# Patient Record
Sex: FEMALE | Race: WHITE | Employment: OTHER | URBAN - NONMETROPOLITAN AREA
[De-identification: names, ages, dates, MRNs, and addresses within clinical notes are randomized per-mention and may not be internally consistent; named-entity substitution may affect disease eponyms.]

---

## 2017-06-06 ENCOUNTER — HOSPITAL ENCOUNTER (OUTPATIENT)
Dept: VASCULAR LAB | Age: 77
Discharge: HOME | End: 2017-06-06
Payer: MEDICARE

## 2017-06-06 ENCOUNTER — OFFICE VISIT (OUTPATIENT)
Dept: VASCULAR SURGERY | Age: 77
End: 2017-06-06
Payer: MEDICARE

## 2017-06-06 VITALS
RESPIRATION RATE: 18 BRPM | DIASTOLIC BLOOD PRESSURE: 64 MMHG | OXYGEN SATURATION: 97 % | TEMPERATURE: 97.1 F | HEART RATE: 59 BPM | SYSTOLIC BLOOD PRESSURE: 118 MMHG

## 2017-06-06 DIAGNOSIS — I65.23 BILATERAL CAROTID ARTERY STENOSIS: ICD-10-CM

## 2017-06-06 DIAGNOSIS — S91.109A OPEN TOE WOUND, INITIAL ENCOUNTER: ICD-10-CM

## 2017-06-06 DIAGNOSIS — I73.9 PVD (PERIPHERAL VASCULAR DISEASE) (HCC): ICD-10-CM

## 2017-06-06 DIAGNOSIS — I73.9 PVD (PERIPHERAL VASCULAR DISEASE) (HCC): Primary | ICD-10-CM

## 2017-06-06 PROCEDURE — 1036F TOBACCO NON-USER: CPT | Performed by: NURSE PRACTITIONER

## 2017-06-06 PROCEDURE — 99214 OFFICE O/P EST MOD 30 MIN: CPT | Performed by: NURSE PRACTITIONER

## 2017-06-06 PROCEDURE — 93880 EXTRACRANIAL BILAT STUDY: CPT

## 2017-06-06 PROCEDURE — G8421 BMI NOT CALCULATED: HCPCS | Performed by: NURSE PRACTITIONER

## 2017-06-06 PROCEDURE — G8427 DOCREV CUR MEDS BY ELIG CLIN: HCPCS | Performed by: NURSE PRACTITIONER

## 2017-06-06 PROCEDURE — G8598 ASA/ANTIPLAT THER USED: HCPCS | Performed by: NURSE PRACTITIONER

## 2017-06-06 PROCEDURE — 4040F PNEUMOC VAC/ADMIN/RCVD: CPT | Performed by: NURSE PRACTITIONER

## 2017-06-06 PROCEDURE — 1123F ACP DISCUSS/DSCN MKR DOCD: CPT | Performed by: NURSE PRACTITIONER

## 2017-06-06 PROCEDURE — 3017F COLORECTAL CA SCREEN DOC REV: CPT | Performed by: NURSE PRACTITIONER

## 2017-06-06 PROCEDURE — 93923 UPR/LXTR ART STDY 3+ LVLS: CPT

## 2017-06-06 PROCEDURE — 1090F PRES/ABSN URINE INCON ASSESS: CPT | Performed by: NURSE PRACTITIONER

## 2017-06-06 PROCEDURE — G8400 PT W/DXA NO RESULTS DOC: HCPCS | Performed by: NURSE PRACTITIONER

## 2017-06-07 ENCOUNTER — TELEPHONE (OUTPATIENT)
Dept: VASCULAR SURGERY | Age: 77
End: 2017-06-07

## 2017-06-14 ENCOUNTER — OFFICE VISIT (OUTPATIENT)
Dept: VASCULAR SURGERY | Age: 77
End: 2017-06-14
Payer: MEDICARE

## 2017-06-14 VITALS
TEMPERATURE: 97.8 F | RESPIRATION RATE: 18 BRPM | DIASTOLIC BLOOD PRESSURE: 70 MMHG | OXYGEN SATURATION: 90 % | SYSTOLIC BLOOD PRESSURE: 130 MMHG | HEART RATE: 69 BPM

## 2017-06-14 DIAGNOSIS — T81.31XD WOUND DEHISCENCE, SUBSEQUENT ENCOUNTER: Primary | ICD-10-CM

## 2017-06-14 DIAGNOSIS — S91.109D WOUND, OPEN, TOE, SUBSEQUENT ENCOUNTER: Primary | ICD-10-CM

## 2017-06-14 PROCEDURE — G8427 DOCREV CUR MEDS BY ELIG CLIN: HCPCS | Performed by: NURSE PRACTITIONER

## 2017-06-14 PROCEDURE — 1036F TOBACCO NON-USER: CPT | Performed by: NURSE PRACTITIONER

## 2017-06-14 PROCEDURE — 1090F PRES/ABSN URINE INCON ASSESS: CPT | Performed by: NURSE PRACTITIONER

## 2017-06-14 PROCEDURE — 1123F ACP DISCUSS/DSCN MKR DOCD: CPT | Performed by: NURSE PRACTITIONER

## 2017-06-14 PROCEDURE — G8400 PT W/DXA NO RESULTS DOC: HCPCS | Performed by: NURSE PRACTITIONER

## 2017-06-14 PROCEDURE — 99212 OFFICE O/P EST SF 10 MIN: CPT | Performed by: NURSE PRACTITIONER

## 2017-06-14 PROCEDURE — 3017F COLORECTAL CA SCREEN DOC REV: CPT | Performed by: NURSE PRACTITIONER

## 2017-06-14 PROCEDURE — G8598 ASA/ANTIPLAT THER USED: HCPCS | Performed by: NURSE PRACTITIONER

## 2017-06-14 PROCEDURE — 4040F PNEUMOC VAC/ADMIN/RCVD: CPT | Performed by: NURSE PRACTITIONER

## 2017-06-14 PROCEDURE — G8421 BMI NOT CALCULATED: HCPCS | Performed by: NURSE PRACTITIONER

## 2017-06-14 RX ORDER — DOXYCYCLINE HYCLATE 100 MG
100 TABLET ORAL 2 TIMES DAILY
Qty: 20 TABLET | Refills: 0 | Status: SHIPPED | OUTPATIENT
Start: 2017-06-14 | End: 2017-06-20 | Stop reason: SDUPTHER

## 2017-06-18 LAB
ANAEROBIC CULTURE: ABNORMAL
GRAM STAIN RESULT: ABNORMAL
ORGANISM: ABNORMAL
WOUND/ABSCESS: ABNORMAL

## 2017-06-20 ENCOUNTER — OFFICE VISIT (OUTPATIENT)
Dept: VASCULAR SURGERY | Age: 77
End: 2017-06-20
Payer: MEDICARE

## 2017-06-20 VITALS
HEART RATE: 60 BPM | DIASTOLIC BLOOD PRESSURE: 80 MMHG | SYSTOLIC BLOOD PRESSURE: 150 MMHG | TEMPERATURE: 96 F | RESPIRATION RATE: 18 BRPM

## 2017-06-20 DIAGNOSIS — S91.109D OPEN TOE WOUND, SUBSEQUENT ENCOUNTER: ICD-10-CM

## 2017-06-20 DIAGNOSIS — I73.9 PVD (PERIPHERAL VASCULAR DISEASE) (HCC): Primary | ICD-10-CM

## 2017-06-20 LAB
INTERNATIONAL NORMALIZATION RATIO, POC: 2.8
PROTHROMBIN TIME, POC: NORMAL

## 2017-06-20 PROCEDURE — 1090F PRES/ABSN URINE INCON ASSESS: CPT | Performed by: NURSE PRACTITIONER

## 2017-06-20 PROCEDURE — 1036F TOBACCO NON-USER: CPT | Performed by: NURSE PRACTITIONER

## 2017-06-20 PROCEDURE — G8421 BMI NOT CALCULATED: HCPCS | Performed by: NURSE PRACTITIONER

## 2017-06-20 PROCEDURE — 85610 PROTHROMBIN TIME: CPT | Performed by: NURSE PRACTITIONER

## 2017-06-20 PROCEDURE — G8598 ASA/ANTIPLAT THER USED: HCPCS | Performed by: NURSE PRACTITIONER

## 2017-06-20 PROCEDURE — 1123F ACP DISCUSS/DSCN MKR DOCD: CPT | Performed by: NURSE PRACTITIONER

## 2017-06-20 PROCEDURE — 3017F COLORECTAL CA SCREEN DOC REV: CPT | Performed by: NURSE PRACTITIONER

## 2017-06-20 PROCEDURE — G8427 DOCREV CUR MEDS BY ELIG CLIN: HCPCS | Performed by: NURSE PRACTITIONER

## 2017-06-20 PROCEDURE — 99212 OFFICE O/P EST SF 10 MIN: CPT | Performed by: NURSE PRACTITIONER

## 2017-06-20 PROCEDURE — 4040F PNEUMOC VAC/ADMIN/RCVD: CPT | Performed by: NURSE PRACTITIONER

## 2017-06-20 PROCEDURE — G8400 PT W/DXA NO RESULTS DOC: HCPCS | Performed by: NURSE PRACTITIONER

## 2017-06-20 RX ORDER — MUPIROCIN CALCIUM 20 MG/G
CREAM TOPICAL
Qty: 1 TUBE | Refills: 1 | Status: SHIPPED | OUTPATIENT
Start: 2017-06-20 | End: 2017-07-20

## 2017-06-20 RX ORDER — DOXYCYCLINE HYCLATE 100 MG
100 TABLET ORAL 2 TIMES DAILY
Qty: 20 TABLET | Refills: 0 | Status: SHIPPED | OUTPATIENT
Start: 2017-06-20 | End: 2017-06-30

## 2017-06-21 PROBLEM — S91.109A OPEN TOE WOUND: Status: ACTIVE | Noted: 2017-06-21

## 2017-06-28 ENCOUNTER — OFFICE VISIT (OUTPATIENT)
Dept: VASCULAR SURGERY | Age: 77
End: 2017-06-28
Payer: MEDICARE

## 2017-06-28 VITALS
TEMPERATURE: 96 F | SYSTOLIC BLOOD PRESSURE: 139 MMHG | RESPIRATION RATE: 18 BRPM | DIASTOLIC BLOOD PRESSURE: 61 MMHG | HEART RATE: 60 BPM

## 2017-06-28 DIAGNOSIS — S91.109D OPEN TOE WOUND, SUBSEQUENT ENCOUNTER: Primary | ICD-10-CM

## 2017-06-28 DIAGNOSIS — I73.9 PVD (PERIPHERAL VASCULAR DISEASE) (HCC): ICD-10-CM

## 2017-06-28 DIAGNOSIS — M1A.9XX1 MULTIPLE GOUTY TOPHI: ICD-10-CM

## 2017-06-28 PROCEDURE — G8427 DOCREV CUR MEDS BY ELIG CLIN: HCPCS | Performed by: PHYSICIAN ASSISTANT

## 2017-06-28 PROCEDURE — 1036F TOBACCO NON-USER: CPT | Performed by: PHYSICIAN ASSISTANT

## 2017-06-28 PROCEDURE — 4040F PNEUMOC VAC/ADMIN/RCVD: CPT | Performed by: PHYSICIAN ASSISTANT

## 2017-06-28 PROCEDURE — 99213 OFFICE O/P EST LOW 20 MIN: CPT | Performed by: PHYSICIAN ASSISTANT

## 2017-06-28 PROCEDURE — G8421 BMI NOT CALCULATED: HCPCS | Performed by: PHYSICIAN ASSISTANT

## 2017-06-28 PROCEDURE — 1123F ACP DISCUSS/DSCN MKR DOCD: CPT | Performed by: PHYSICIAN ASSISTANT

## 2017-06-28 PROCEDURE — G8400 PT W/DXA NO RESULTS DOC: HCPCS | Performed by: PHYSICIAN ASSISTANT

## 2017-06-28 PROCEDURE — 1090F PRES/ABSN URINE INCON ASSESS: CPT | Performed by: PHYSICIAN ASSISTANT

## 2017-06-28 PROCEDURE — 3017F COLORECTAL CA SCREEN DOC REV: CPT | Performed by: PHYSICIAN ASSISTANT

## 2017-06-28 PROCEDURE — G8598 ASA/ANTIPLAT THER USED: HCPCS | Performed by: PHYSICIAN ASSISTANT

## 2017-08-03 ENCOUNTER — OFFICE VISIT (OUTPATIENT)
Dept: VASCULAR SURGERY | Age: 77
End: 2017-08-03
Payer: MEDICARE

## 2017-08-03 VITALS
SYSTOLIC BLOOD PRESSURE: 165 MMHG | DIASTOLIC BLOOD PRESSURE: 75 MMHG | RESPIRATION RATE: 18 BRPM | HEART RATE: 65 BPM | TEMPERATURE: 96 F

## 2017-08-03 DIAGNOSIS — S91.109D OPEN TOE WOUND, SUBSEQUENT ENCOUNTER: Primary | ICD-10-CM

## 2017-08-03 PROCEDURE — 3017F COLORECTAL CA SCREEN DOC REV: CPT | Performed by: PHYSICIAN ASSISTANT

## 2017-08-03 PROCEDURE — G8421 BMI NOT CALCULATED: HCPCS | Performed by: PHYSICIAN ASSISTANT

## 2017-08-03 PROCEDURE — G8598 ASA/ANTIPLAT THER USED: HCPCS | Performed by: PHYSICIAN ASSISTANT

## 2017-08-03 PROCEDURE — 1090F PRES/ABSN URINE INCON ASSESS: CPT | Performed by: PHYSICIAN ASSISTANT

## 2017-08-03 PROCEDURE — 4040F PNEUMOC VAC/ADMIN/RCVD: CPT | Performed by: PHYSICIAN ASSISTANT

## 2017-08-03 PROCEDURE — G8400 PT W/DXA NO RESULTS DOC: HCPCS | Performed by: PHYSICIAN ASSISTANT

## 2017-08-03 PROCEDURE — G8427 DOCREV CUR MEDS BY ELIG CLIN: HCPCS | Performed by: PHYSICIAN ASSISTANT

## 2017-08-03 PROCEDURE — 1036F TOBACCO NON-USER: CPT | Performed by: PHYSICIAN ASSISTANT

## 2017-08-03 PROCEDURE — 1123F ACP DISCUSS/DSCN MKR DOCD: CPT | Performed by: PHYSICIAN ASSISTANT

## 2017-08-03 PROCEDURE — 99213 OFFICE O/P EST LOW 20 MIN: CPT | Performed by: PHYSICIAN ASSISTANT

## 2017-09-19 ENCOUNTER — OFFICE VISIT (OUTPATIENT)
Dept: VASCULAR SURGERY | Age: 77
End: 2017-09-19
Payer: MEDICARE

## 2017-09-19 VITALS
WEIGHT: 162 LBS | OXYGEN SATURATION: 92 % | HEIGHT: 62 IN | HEART RATE: 61 BPM | SYSTOLIC BLOOD PRESSURE: 126 MMHG | DIASTOLIC BLOOD PRESSURE: 70 MMHG | BODY MASS INDEX: 29.81 KG/M2

## 2017-09-19 DIAGNOSIS — S91.109D OPEN TOE WOUND, SUBSEQUENT ENCOUNTER: Primary | ICD-10-CM

## 2017-09-19 DIAGNOSIS — M1A.9XX1 MULTIPLE GOUTY TOPHI: ICD-10-CM

## 2017-09-19 PROCEDURE — G8427 DOCREV CUR MEDS BY ELIG CLIN: HCPCS | Performed by: PHYSICIAN ASSISTANT

## 2017-09-19 PROCEDURE — G8419 CALC BMI OUT NRM PARAM NOF/U: HCPCS | Performed by: PHYSICIAN ASSISTANT

## 2017-09-19 PROCEDURE — G8400 PT W/DXA NO RESULTS DOC: HCPCS | Performed by: PHYSICIAN ASSISTANT

## 2017-09-19 PROCEDURE — 1090F PRES/ABSN URINE INCON ASSESS: CPT | Performed by: PHYSICIAN ASSISTANT

## 2017-09-19 PROCEDURE — 1123F ACP DISCUSS/DSCN MKR DOCD: CPT | Performed by: PHYSICIAN ASSISTANT

## 2017-09-19 PROCEDURE — G8598 ASA/ANTIPLAT THER USED: HCPCS | Performed by: PHYSICIAN ASSISTANT

## 2017-09-19 PROCEDURE — 99213 OFFICE O/P EST LOW 20 MIN: CPT | Performed by: PHYSICIAN ASSISTANT

## 2017-09-19 PROCEDURE — 3017F COLORECTAL CA SCREEN DOC REV: CPT | Performed by: PHYSICIAN ASSISTANT

## 2017-09-19 PROCEDURE — 4040F PNEUMOC VAC/ADMIN/RCVD: CPT | Performed by: PHYSICIAN ASSISTANT

## 2017-09-19 PROCEDURE — 1036F TOBACCO NON-USER: CPT | Performed by: PHYSICIAN ASSISTANT

## 2017-11-14 ENCOUNTER — OFFICE VISIT (OUTPATIENT)
Dept: VASCULAR SURGERY | Age: 77
End: 2017-11-14
Payer: MEDICARE

## 2017-11-14 VITALS — SYSTOLIC BLOOD PRESSURE: 118 MMHG | DIASTOLIC BLOOD PRESSURE: 78 MMHG

## 2017-11-14 DIAGNOSIS — S91.109D OPEN WOUND OF TOE, SUBSEQUENT ENCOUNTER: Primary | ICD-10-CM

## 2017-11-14 PROCEDURE — G8400 PT W/DXA NO RESULTS DOC: HCPCS | Performed by: NURSE PRACTITIONER

## 2017-11-14 PROCEDURE — 1036F TOBACCO NON-USER: CPT | Performed by: NURSE PRACTITIONER

## 2017-11-14 PROCEDURE — G8484 FLU IMMUNIZE NO ADMIN: HCPCS | Performed by: NURSE PRACTITIONER

## 2017-11-14 PROCEDURE — 1090F PRES/ABSN URINE INCON ASSESS: CPT | Performed by: NURSE PRACTITIONER

## 2017-11-14 PROCEDURE — G8598 ASA/ANTIPLAT THER USED: HCPCS | Performed by: NURSE PRACTITIONER

## 2017-11-14 PROCEDURE — 3017F COLORECTAL CA SCREEN DOC REV: CPT | Performed by: NURSE PRACTITIONER

## 2017-11-14 PROCEDURE — 1123F ACP DISCUSS/DSCN MKR DOCD: CPT | Performed by: NURSE PRACTITIONER

## 2017-11-14 PROCEDURE — G8417 CALC BMI ABV UP PARAM F/U: HCPCS | Performed by: NURSE PRACTITIONER

## 2017-11-14 PROCEDURE — 4040F PNEUMOC VAC/ADMIN/RCVD: CPT | Performed by: NURSE PRACTITIONER

## 2017-11-14 PROCEDURE — 99212 OFFICE O/P EST SF 10 MIN: CPT | Performed by: NURSE PRACTITIONER

## 2017-11-14 PROCEDURE — G8428 CUR MEDS NOT DOCUMENT: HCPCS | Performed by: NURSE PRACTITIONER

## 2018-01-31 ENCOUNTER — HOSPITAL ENCOUNTER (OUTPATIENT)
Dept: NON INVASIVE DIAGNOSTICS | Age: 78
Discharge: HOME | End: 2018-01-31
Payer: MEDICARE

## 2018-01-31 ENCOUNTER — OFFICE VISIT (OUTPATIENT)
Dept: VASCULAR SURGERY | Age: 78
End: 2018-01-31
Payer: MEDICARE

## 2018-01-31 VITALS
DIASTOLIC BLOOD PRESSURE: 64 MMHG | SYSTOLIC BLOOD PRESSURE: 134 MMHG | TEMPERATURE: 96.2 F | HEART RATE: 60 BPM | RESPIRATION RATE: 18 BRPM

## 2018-01-31 DIAGNOSIS — I73.9 PVD (PERIPHERAL VASCULAR DISEASE) (HCC): Primary | ICD-10-CM

## 2018-01-31 DIAGNOSIS — S91.109D OPEN WOUND OF TOE, SUBSEQUENT ENCOUNTER: ICD-10-CM

## 2018-01-31 DIAGNOSIS — I73.9 PVD (PERIPHERAL VASCULAR DISEASE) (HCC): ICD-10-CM

## 2018-01-31 PROCEDURE — 4040F PNEUMOC VAC/ADMIN/RCVD: CPT | Performed by: PHYSICIAN ASSISTANT

## 2018-01-31 PROCEDURE — 1036F TOBACCO NON-USER: CPT | Performed by: PHYSICIAN ASSISTANT

## 2018-01-31 PROCEDURE — G8484 FLU IMMUNIZE NO ADMIN: HCPCS | Performed by: PHYSICIAN ASSISTANT

## 2018-01-31 PROCEDURE — G8598 ASA/ANTIPLAT THER USED: HCPCS | Performed by: PHYSICIAN ASSISTANT

## 2018-01-31 PROCEDURE — G8417 CALC BMI ABV UP PARAM F/U: HCPCS | Performed by: PHYSICIAN ASSISTANT

## 2018-01-31 PROCEDURE — 1123F ACP DISCUSS/DSCN MKR DOCD: CPT | Performed by: PHYSICIAN ASSISTANT

## 2018-01-31 PROCEDURE — 93923 UPR/LXTR ART STDY 3+ LVLS: CPT

## 2018-01-31 PROCEDURE — G8400 PT W/DXA NO RESULTS DOC: HCPCS | Performed by: PHYSICIAN ASSISTANT

## 2018-01-31 PROCEDURE — 99213 OFFICE O/P EST LOW 20 MIN: CPT | Performed by: PHYSICIAN ASSISTANT

## 2018-01-31 PROCEDURE — G8427 DOCREV CUR MEDS BY ELIG CLIN: HCPCS | Performed by: PHYSICIAN ASSISTANT

## 2018-01-31 PROCEDURE — 1090F PRES/ABSN URINE INCON ASSESS: CPT | Performed by: PHYSICIAN ASSISTANT

## 2018-01-31 RX ORDER — HYDROCODONE BITARTRATE AND ACETAMINOPHEN 7.5; 325 MG/1; MG/1
1 TABLET ORAL EVERY 6 HOURS PRN
COMMUNITY

## 2018-01-31 NOTE — PROGRESS NOTES
Patient Care Team:  Ni Woody MD (Inactive) as PCP - Mariano Cooks, MD (Family Medicine)  MD Latesha Seals MD as Consulting Physician (Vascular Surgery)      Mrs. Naida Caldwell presents today for f/u chronic wound check. She reports she has three open wounds on her toes and a callus on her left heel. She is still using mupirocin on the wounds. She thinks that the wounds look a little better. She denies any pain in the wounds at this time. She denies any fever/chills. Pablo Ibarra is a 69 y.o. female with the following history reviewed and recorded in [facility]:  Patient Active Problem List    Diagnosis Date Noted    Open toe wound 06/21/2017    PVD (peripheral vascular disease) ([facility] [address] 04/08/2015    Carotid artery stenosis 02/02/2012    CKD (chronic kidney disease) stage 3, GFR 30-59 ml/min     CHF (congestive heart failure) ([facility] [address]     CAD (coronary artery disease)     Hyperlipidemia     Hypertension      Current Outpatient Prescriptions   Medication Sig Dispense Refill    sacubitril-valsartan (ENTRESTO) 49-51 MG per tablet Take 1 tablet by mouth 2 times daily      HYDROcodone-acetaminophen (NORCO) 7.5-325 MG per tablet Take 1 tablet by mouth every 6 hours as needed for Pain.  mupirocin (BACTROBAN) 2 % ointment Apply topically 3 times daily Apply topically 3 times daily.  insulin lispro (HUMALOG) 100 UNIT/ML injection vial Inject into the skin 3 times daily (before meals)      colchicine 0.6 MG tablet Take 0.6 mg by mouth daily as needed.  ondansetron (ZOFRAN) 4 MG tablet Take 4 mg by mouth every 8 hours as needed.  Ergocalciferol (VITAMIN D2 PO) Take 50,000 Units by mouth once a week.  calcitRIOL (ROCALTROL) 0.25 MCG capsule Take 0.25 mcg by mouth every other day Indications: 5 days a week       carvedilol (COREG) 25 MG tablet Take 25 mg by mouth 2 times daily (with meals).       clonidine (CATAPRES) 0.2 MG tablet Take 0.2 mg by mouth 2 times daily.  furosemide (LASIX) 80 MG tablet Take 80 mg by mouth 2 times daily.  LANTUS 100 UNIT/ML injection Inject 20 Units into the skin nightly       metolazone (ZAROXOLYN) 2.5 MG tablet Take 2.5 mg by mouth as needed.  warfarin (COUMADIN) 1 MG tablet       warfarin (COUMADIN) 5 MG tablet Dr Leny Hare regulates      aspirin EC 81 MG EC tablet Take 81 mg by mouth daily. No current facility-administered medications for this visit.       Allergies: Iron  Past Medical History:   Diagnosis Date    Anemia     Asthma     CAD (coronary artery disease)     Carotid artery occlusion     [facility]     Chronic kidney disease, stage IV (severe) [facility])     Chronic respiratory failure ([facility])     Diabetes mellitus ([facility])     Type 2    Hypercholesteremia     Hyperlipidemia     Hypertension     Intraparenchymal renal artery disease ([facility])     Peripheral vascular disease ([facility] [address]     Secondary hyperparathyroidism (of renal origin)      Past Surgical History:   Procedure Laterality Date    ANGIOPLASTY  05/25/06 [facility]    Right SFA and popliteal artery agioplasty with 6x20 cutting balloon    ANGIOPLASTY  07/06/06 [facility]    Left CFA endarterectomy with vein patch angioplasty and remote SFA endarterectomy with distal end-point stenting in proximal pop artery through the distal SFA with a 6x15 viabahn stent and completion angiography    CATARACT REMOVAL  January    CATARACT REMOVAL  March    CHOLECYSTECTOMY  1991    CORONARY ARTERY BYPASS GRAFT  07/06/2005 [facility]    6 vessel    HERNIA REPAIR  1991    OTHER SURGICAL HISTORY  03/09/07 [facility]    aortogram and run off    TUBAL LIGATION  1974     Family History   Problem Relation Age of Onset    Cancer Mother     Diabetes Mother     Cancer Father      Social History   Substance Use Topics    Smoking status: Former Smoker     Packs/day: 1.50     Years: 40.00     Quit date: 2/1/2005    Smokeless tobacco: Never Used    Alcohol risks and benefits. She declined A-gram at this time. We will follow up in the office for a wound check.

## 2018-03-06 ENCOUNTER — HOSPITAL ENCOUNTER (INPATIENT)
Facility: HOSPITAL | Age: 78
LOS: 7 days | Discharge: NURSING, REHAB OR LONG-TERM CARE | End: 2018-03-13
Attending: INTERNAL MEDICINE | Admitting: INTERNAL MEDICINE

## 2018-03-06 DIAGNOSIS — Z74.09 IMPAIRED MOBILITY AND ACTIVITIES OF DAILY LIVING: ICD-10-CM

## 2018-03-06 DIAGNOSIS — Z74.09 IMPAIRED PHYSICAL MOBILITY: ICD-10-CM

## 2018-03-06 DIAGNOSIS — Z78.9 IMPAIRED MOBILITY AND ACTIVITIES OF DAILY LIVING: ICD-10-CM

## 2018-03-06 PROBLEM — N17.9 AKI (ACUTE KIDNEY INJURY) (HCC): Status: ACTIVE | Noted: 2018-03-06

## 2018-03-06 LAB
BACTERIA UR QL AUTO: ABNORMAL /HPF
BILIRUB UR QL STRIP: NEGATIVE
CLARITY UR: ABNORMAL
COLOR UR: YELLOW
GLUCOSE UR STRIP-MCNC: NEGATIVE MG/DL
HGB UR QL STRIP.AUTO: ABNORMAL
HYALINE CASTS UR QL AUTO: ABNORMAL /LPF
KETONES UR QL STRIP: NEGATIVE
LEUKOCYTE ESTERASE UR QL STRIP.AUTO: ABNORMAL
NITRITE UR QL STRIP: NEGATIVE
PH UR STRIP.AUTO: 5.5 [PH] (ref 5–9)
PROT UR QL STRIP: ABNORMAL
RBC # UR: ABNORMAL /HPF
REF LAB TEST METHOD: ABNORMAL
SP GR UR STRIP: 1.01 (ref 1–1.03)
SQUAMOUS #/AREA URNS HPF: ABNORMAL /HPF
UROBILINOGEN UR QL STRIP: ABNORMAL
WBC UR QL AUTO: ABNORMAL /HPF

## 2018-03-06 PROCEDURE — 25010000002 MORPHINE PER 10 MG: Performed by: HOSPITALIST

## 2018-03-06 PROCEDURE — 82962 GLUCOSE BLOOD TEST: CPT

## 2018-03-06 PROCEDURE — 87077 CULTURE AEROBIC IDENTIFY: CPT | Performed by: HOSPITALIST

## 2018-03-06 PROCEDURE — 81001 URINALYSIS AUTO W/SCOPE: CPT | Performed by: HOSPITALIST

## 2018-03-06 PROCEDURE — 87086 URINE CULTURE/COLONY COUNT: CPT | Performed by: HOSPITALIST

## 2018-03-06 PROCEDURE — 93005 ELECTROCARDIOGRAM TRACING: CPT | Performed by: HOSPITALIST

## 2018-03-06 PROCEDURE — 85025 COMPLETE CBC W/AUTO DIFF WBC: CPT | Performed by: HOSPITALIST

## 2018-03-06 PROCEDURE — 84484 ASSAY OF TROPONIN QUANT: CPT | Performed by: HOSPITALIST

## 2018-03-06 PROCEDURE — 87186 SC STD MICRODIL/AGAR DIL: CPT | Performed by: HOSPITALIST

## 2018-03-06 PROCEDURE — 81003 URINALYSIS AUTO W/O SCOPE: CPT | Performed by: HOSPITALIST

## 2018-03-06 PROCEDURE — 80053 COMPREHEN METABOLIC PANEL: CPT | Performed by: HOSPITALIST

## 2018-03-06 RX ORDER — SODIUM CHLORIDE 9 MG/ML
40 INJECTION, SOLUTION INTRAVENOUS CONTINUOUS
Status: DISCONTINUED | OUTPATIENT
Start: 2018-03-06 | End: 2018-03-09

## 2018-03-06 RX ORDER — CLONIDINE HYDROCHLORIDE 0.2 MG/1
0.2 TABLET ORAL DAILY
Status: DISCONTINUED | OUTPATIENT
Start: 2018-03-07 | End: 2018-03-13 | Stop reason: HOSPADM

## 2018-03-06 RX ORDER — SODIUM CHLORIDE 0.9 % (FLUSH) 0.9 %
1-10 SYRINGE (ML) INJECTION AS NEEDED
Status: DISCONTINUED | OUTPATIENT
Start: 2018-03-06 | End: 2018-03-13 | Stop reason: HOSPADM

## 2018-03-06 RX ORDER — CLONIDINE HYDROCHLORIDE 0.2 MG/1
0.4 TABLET ORAL NIGHTLY
Status: DISCONTINUED | OUTPATIENT
Start: 2018-03-06 | End: 2018-03-13 | Stop reason: HOSPADM

## 2018-03-06 RX ORDER — CLONIDINE HYDROCHLORIDE 0.2 MG/1
0.4 TABLET ORAL NIGHTLY
COMMUNITY

## 2018-03-06 RX ORDER — MORPHINE SULFATE 2 MG/ML
1 INJECTION, SOLUTION INTRAMUSCULAR; INTRAVENOUS EVERY 4 HOURS PRN
Status: DISCONTINUED | OUTPATIENT
Start: 2018-03-06 | End: 2018-03-13 | Stop reason: HOSPADM

## 2018-03-06 RX ORDER — CARVEDILOL 12.5 MG/1
12.5 TABLET ORAL 2 TIMES DAILY WITH MEALS
COMMUNITY

## 2018-03-06 RX ORDER — CLONIDINE HYDROCHLORIDE 0.2 MG/1
0.2 TABLET ORAL DAILY
COMMUNITY

## 2018-03-06 RX ORDER — CALCITRIOL 0.25 UG/1
0.25 CAPSULE, LIQUID FILLED ORAL DAILY
COMMUNITY

## 2018-03-06 RX ORDER — CARVEDILOL 12.5 MG/1
12.5 TABLET ORAL 2 TIMES DAILY WITH MEALS
Status: DISCONTINUED | OUTPATIENT
Start: 2018-03-06 | End: 2018-03-13 | Stop reason: HOSPADM

## 2018-03-06 RX ORDER — CALCITRIOL 0.25 UG/1
0.25 CAPSULE, LIQUID FILLED ORAL DAILY
Status: DISCONTINUED | OUTPATIENT
Start: 2018-03-07 | End: 2018-03-12

## 2018-03-06 RX ORDER — HEPARIN SODIUM 5000 [USP'U]/ML
5000 INJECTION, SOLUTION INTRAVENOUS; SUBCUTANEOUS EVERY 12 HOURS SCHEDULED
Status: DISCONTINUED | OUTPATIENT
Start: 2018-03-06 | End: 2018-03-07

## 2018-03-06 RX ORDER — CLORAZEPATE DIPOTASSIUM 3.75 MG/1
3.75 TABLET ORAL NIGHTLY
COMMUNITY

## 2018-03-06 RX ORDER — CLORAZEPATE DIPOTASSIUM 3.75 MG/1
3.75 TABLET ORAL NIGHTLY
Status: DISCONTINUED | OUTPATIENT
Start: 2018-03-06 | End: 2018-03-13 | Stop reason: HOSPADM

## 2018-03-06 RX ORDER — ASPIRIN 81 MG/1
81 TABLET ORAL DAILY
COMMUNITY

## 2018-03-06 RX ORDER — ASPIRIN 81 MG/1
81 TABLET ORAL DAILY
Status: DISCONTINUED | OUTPATIENT
Start: 2018-03-07 | End: 2018-03-13 | Stop reason: HOSPADM

## 2018-03-06 RX ADMIN — MORPHINE SULFATE 1 MG: 2 INJECTION, SOLUTION INTRAMUSCULAR; INTRAVENOUS at 23:49

## 2018-03-07 LAB
ALBUMIN SERPL-MCNC: 3.5 G/DL (ref 3.4–4.8)
ALBUMIN/GLOB SERPL: 1 G/DL (ref 1.1–1.8)
ALP SERPL-CCNC: 83 U/L (ref 38–126)
ALT SERPL W P-5'-P-CCNC: 29 U/L (ref 9–52)
ANION GAP SERPL CALCULATED.3IONS-SCNC: 17 MMOL/L (ref 5–15)
ANION GAP SERPL CALCULATED.3IONS-SCNC: 22 MMOL/L (ref 5–15)
AST SERPL-CCNC: 16 U/L (ref 14–36)
BASOPHILS # BLD AUTO: 0.03 10*3/MM3 (ref 0–0.2)
BASOPHILS # BLD AUTO: 0.03 10*3/MM3 (ref 0–0.2)
BASOPHILS NFR BLD AUTO: 0.3 % (ref 0–2)
BASOPHILS NFR BLD AUTO: 0.3 % (ref 0–2)
BILIRUB SERPL-MCNC: 0.6 MG/DL (ref 0.2–1.3)
BUN BLD-MCNC: 96 MG/DL (ref 7–21)
BUN BLD-MCNC: 99 MG/DL (ref 7–21)
BUN/CREAT SERPL: 22.1 (ref 7–25)
BUN/CREAT SERPL: 23.7 (ref 7–25)
CALCIUM SPEC-SCNC: 9.6 MG/DL (ref 8.4–10.2)
CALCIUM SPEC-SCNC: 9.7 MG/DL (ref 8.4–10.2)
CHLORIDE SERPL-SCNC: 95 MMOL/L (ref 95–110)
CHLORIDE SERPL-SCNC: 96 MMOL/L (ref 95–110)
CO2 SERPL-SCNC: 24 MMOL/L (ref 22–31)
CO2 SERPL-SCNC: 26 MMOL/L (ref 22–31)
CREAT BLD-MCNC: 4.17 MG/DL (ref 0.5–1)
CREAT BLD-MCNC: 4.34 MG/DL (ref 0.5–1)
DEPRECATED RDW RBC AUTO: 49.6 FL (ref 36.4–46.3)
DEPRECATED RDW RBC AUTO: 49.9 FL (ref 36.4–46.3)
EOSINOPHIL # BLD AUTO: 0.16 10*3/MM3 (ref 0–0.7)
EOSINOPHIL # BLD AUTO: 0.17 10*3/MM3 (ref 0–0.7)
EOSINOPHIL NFR BLD AUTO: 1.5 % (ref 0–7)
EOSINOPHIL NFR BLD AUTO: 1.5 % (ref 0–7)
ERYTHROCYTE [DISTWIDTH] IN BLOOD BY AUTOMATED COUNT: 17.3 % (ref 11.5–14.5)
ERYTHROCYTE [DISTWIDTH] IN BLOOD BY AUTOMATED COUNT: 17.4 % (ref 11.5–14.5)
GFR SERPL CREATININE-BSD FRML MDRD: 10 ML/MIN/1.73 (ref 39–90)
GFR SERPL CREATININE-BSD FRML MDRD: 10 ML/MIN/1.73 (ref 60–90)
GLOBULIN UR ELPH-MCNC: 3.4 GM/DL (ref 2.3–3.5)
GLUCOSE BLD-MCNC: 206 MG/DL (ref 60–100)
GLUCOSE BLD-MCNC: 212 MG/DL (ref 60–100)
GLUCOSE BLDC GLUCOMTR-MCNC: 181 MG/DL (ref 70–130)
GLUCOSE BLDC GLUCOMTR-MCNC: 203 MG/DL (ref 70–130)
GLUCOSE BLDC GLUCOMTR-MCNC: 220 MG/DL (ref 70–130)
GLUCOSE BLDC GLUCOMTR-MCNC: 279 MG/DL (ref 70–130)
HBA1C MFR BLD: 8.8 % (ref 4–5.6)
HCT VFR BLD AUTO: 32.3 % (ref 35–45)
HCT VFR BLD AUTO: 34.2 % (ref 35–45)
HGB BLD-MCNC: 10.8 G/DL (ref 12–15.5)
HGB BLD-MCNC: 11.5 G/DL (ref 12–15.5)
IMM GRANULOCYTES # BLD: 0.01 10*3/MM3 (ref 0–0.02)
IMM GRANULOCYTES # BLD: 0.03 10*3/MM3 (ref 0–0.02)
IMM GRANULOCYTES NFR BLD: 0.1 % (ref 0–0.5)
IMM GRANULOCYTES NFR BLD: 0.3 % (ref 0–0.5)
INR PPP: 2.19 (ref 0.8–1.2)
LYMPHOCYTES # BLD AUTO: 1.27 10*3/MM3 (ref 0.6–4.2)
LYMPHOCYTES # BLD AUTO: 1.29 10*3/MM3 (ref 0.6–4.2)
LYMPHOCYTES NFR BLD AUTO: 11.4 % (ref 10–50)
LYMPHOCYTES NFR BLD AUTO: 12.3 % (ref 10–50)
MCH RBC QN AUTO: 26.4 PG (ref 26.5–34)
MCH RBC QN AUTO: 26.6 PG (ref 26.5–34)
MCHC RBC AUTO-ENTMCNC: 33.4 G/DL (ref 31.4–36)
MCHC RBC AUTO-ENTMCNC: 33.6 G/DL (ref 31.4–36)
MCV RBC AUTO: 79 FL (ref 80–98)
MCV RBC AUTO: 79.2 FL (ref 80–98)
MONOCYTES # BLD AUTO: 0.68 10*3/MM3 (ref 0–0.9)
MONOCYTES # BLD AUTO: 0.73 10*3/MM3 (ref 0–0.9)
MONOCYTES NFR BLD AUTO: 6.1 % (ref 0–12)
MONOCYTES NFR BLD AUTO: 7 % (ref 0–12)
NEUTROPHILS # BLD AUTO: 8.24 10*3/MM3 (ref 2–8.6)
NEUTROPHILS # BLD AUTO: 8.99 10*3/MM3 (ref 2–8.6)
NEUTROPHILS NFR BLD AUTO: 78.8 % (ref 37–80)
NEUTROPHILS NFR BLD AUTO: 80.4 % (ref 37–80)
PLATELET # BLD AUTO: 290 10*3/MM3 (ref 150–450)
PLATELET # BLD AUTO: 323 10*3/MM3 (ref 150–450)
PMV BLD AUTO: 10.8 FL (ref 8–12)
PMV BLD AUTO: 11.2 FL (ref 8–12)
POTASSIUM BLD-SCNC: 3.1 MMOL/L (ref 3.5–5.1)
POTASSIUM BLD-SCNC: 3.3 MMOL/L (ref 3.5–5.1)
PROT SERPL-MCNC: 6.9 G/DL (ref 6.3–8.6)
PROTHROMBIN TIME: 23.4 SECONDS (ref 11.1–15.3)
RBC # BLD AUTO: 4.09 10*6/MM3 (ref 3.77–5.16)
RBC # BLD AUTO: 4.32 10*6/MM3 (ref 3.77–5.16)
SODIUM BLD-SCNC: 139 MMOL/L (ref 137–145)
SODIUM BLD-SCNC: 141 MMOL/L (ref 137–145)
TROPONIN I SERPL-MCNC: 0.04 NG/ML
TROPONIN I SERPL-MCNC: 0.05 NG/ML
TROPONIN I SERPL-MCNC: 0.05 NG/ML
WBC NRBC COR # BLD: 10.46 10*3/MM3 (ref 3.2–9.8)
WBC NRBC COR # BLD: 11.17 10*3/MM3 (ref 3.2–9.8)
WHOLE BLOOD HOLD SPECIMEN: NORMAL

## 2018-03-07 PROCEDURE — 25010000002 CEFTRIAXONE PER 250 MG: Performed by: HOSPITALIST

## 2018-03-07 PROCEDURE — 85610 PROTHROMBIN TIME: CPT | Performed by: HOSPITALIST

## 2018-03-07 PROCEDURE — 93010 ELECTROCARDIOGRAM REPORT: CPT | Performed by: INTERNAL MEDICINE

## 2018-03-07 PROCEDURE — 80048 BASIC METABOLIC PNL TOTAL CA: CPT | Performed by: HOSPITALIST

## 2018-03-07 PROCEDURE — 25010000002 HEPARIN (PORCINE) PER 1000 UNITS: Performed by: HOSPITALIST

## 2018-03-07 PROCEDURE — 85025 COMPLETE CBC W/AUTO DIFF WBC: CPT | Performed by: HOSPITALIST

## 2018-03-07 PROCEDURE — 84484 ASSAY OF TROPONIN QUANT: CPT | Performed by: HOSPITALIST

## 2018-03-07 PROCEDURE — 25010000002 CEFTRIAXONE: Performed by: HOSPITALIST

## 2018-03-07 PROCEDURE — 82962 GLUCOSE BLOOD TEST: CPT

## 2018-03-07 PROCEDURE — 25010000002 MORPHINE PER 10 MG: Performed by: HOSPITALIST

## 2018-03-07 PROCEDURE — 83036 HEMOGLOBIN GLYCOSYLATED A1C: CPT | Performed by: INTERNAL MEDICINE

## 2018-03-07 PROCEDURE — 63710000001 INSULIN ASPART PER 5 UNITS: Performed by: INTERNAL MEDICINE

## 2018-03-07 RX ORDER — DEXTROSE MONOHYDRATE 25 G/50ML
25 INJECTION, SOLUTION INTRAVENOUS
Status: DISCONTINUED | OUTPATIENT
Start: 2018-03-07 | End: 2018-03-13 | Stop reason: HOSPADM

## 2018-03-07 RX ORDER — LEVOTHYROXINE SODIUM 0.07 MG/1
75 TABLET ORAL DAILY
Status: DISCONTINUED | OUTPATIENT
Start: 2018-03-07 | End: 2018-03-13 | Stop reason: HOSPADM

## 2018-03-07 RX ORDER — INSULIN GLARGINE 100 [IU]/ML
20 INJECTION, SOLUTION SUBCUTANEOUS NIGHTLY
COMMUNITY

## 2018-03-07 RX ORDER — ERGOCALCIFEROL 1.25 MG/1
50000 CAPSULE ORAL
COMMUNITY

## 2018-03-07 RX ORDER — WARFARIN SODIUM 1 MG/1
1 TABLET ORAL
COMMUNITY

## 2018-03-07 RX ORDER — WARFARIN SODIUM 3 MG
1.5 TABLET ORAL
Status: DISCONTINUED | OUTPATIENT
Start: 2018-03-07 | End: 2018-03-08

## 2018-03-07 RX ORDER — NYSTATIN 100000 [USP'U]/G
POWDER TOPICAL EVERY 12 HOURS SCHEDULED
Status: DISCONTINUED | OUTPATIENT
Start: 2018-03-08 | End: 2018-03-13 | Stop reason: HOSPADM

## 2018-03-07 RX ORDER — PANTOPRAZOLE SODIUM 40 MG/1
40 TABLET, DELAYED RELEASE ORAL EVERY MORNING
Status: DISCONTINUED | OUTPATIENT
Start: 2018-03-07 | End: 2018-03-13 | Stop reason: HOSPADM

## 2018-03-07 RX ORDER — LISINOPRIL 20 MG/1
20 TABLET ORAL DAILY
COMMUNITY
End: 2018-03-13 | Stop reason: HOSPADM

## 2018-03-07 RX ORDER — ESOMEPRAZOLE MAGNESIUM 40 MG/1
40 CAPSULE, DELAYED RELEASE ORAL
COMMUNITY

## 2018-03-07 RX ORDER — COLCHICINE 0.6 MG/1
0.6 TABLET ORAL AS NEEDED
COMMUNITY

## 2018-03-07 RX ORDER — WARFARIN SODIUM 2.5 MG/1
2.5 TABLET ORAL 2 TIMES WEEKLY
COMMUNITY

## 2018-03-07 RX ORDER — NICOTINE POLACRILEX 4 MG
15 LOZENGE BUCCAL
Status: DISCONTINUED | OUTPATIENT
Start: 2018-03-07 | End: 2018-03-13 | Stop reason: HOSPADM

## 2018-03-07 RX ORDER — LEVOTHYROXINE SODIUM 0.07 MG/1
75 TABLET ORAL DAILY
COMMUNITY

## 2018-03-07 RX ORDER — PRAVASTATIN SODIUM 20 MG
80 TABLET ORAL NIGHTLY
COMMUNITY

## 2018-03-07 RX ORDER — FUROSEMIDE 80 MG
80 TABLET ORAL 2 TIMES DAILY
COMMUNITY

## 2018-03-07 RX ORDER — GLYBURIDE 5 MG/1
10 TABLET ORAL 2 TIMES DAILY
COMMUNITY

## 2018-03-07 RX ORDER — HYDROCODONE BITARTRATE AND ACETAMINOPHEN 5; 325 MG/1; MG/1
1.5 TABLET ORAL EVERY 6 HOURS PRN
Status: DISCONTINUED | OUTPATIENT
Start: 2018-03-07 | End: 2018-03-07

## 2018-03-07 RX ORDER — PROMETHAZINE HYDROCHLORIDE 25 MG/1
25 TABLET ORAL AS NEEDED
COMMUNITY
End: 2018-03-13 | Stop reason: HOSPADM

## 2018-03-07 RX ORDER — METOLAZONE 2.5 MG/1
2.5 TABLET ORAL AS NEEDED
COMMUNITY
End: 2018-03-13 | Stop reason: HOSPADM

## 2018-03-07 RX ORDER — HYDROCODONE BITARTRATE AND ACETAMINOPHEN 5; 325 MG/1; MG/1
1.5 TABLET ORAL EVERY 6 HOURS PRN
COMMUNITY

## 2018-03-07 RX ORDER — POTASSIUM CHLORIDE 20 MEQ/1
20 TABLET, EXTENDED RELEASE ORAL 2 TIMES DAILY
COMMUNITY
End: 2018-03-13 | Stop reason: HOSPADM

## 2018-03-07 RX ORDER — POTASSIUM CHLORIDE 750 MG/1
20 CAPSULE, EXTENDED RELEASE ORAL ONCE
Status: COMPLETED | OUTPATIENT
Start: 2018-03-07 | End: 2018-03-07

## 2018-03-07 RX ORDER — HYDROCODONE BITARTRATE AND ACETAMINOPHEN 7.5; 325 MG/1; MG/1
1 TABLET ORAL EVERY 6 HOURS PRN
Status: DISCONTINUED | OUTPATIENT
Start: 2018-03-07 | End: 2018-03-13 | Stop reason: HOSPADM

## 2018-03-07 RX ORDER — ATORVASTATIN CALCIUM 10 MG/1
10 TABLET, FILM COATED ORAL DAILY
Status: DISCONTINUED | OUTPATIENT
Start: 2018-03-07 | End: 2018-03-13 | Stop reason: HOSPADM

## 2018-03-07 RX ADMIN — CEFTRIAXONE 1 G: 1 INJECTION, POWDER, FOR SOLUTION INTRAMUSCULAR; INTRAVENOUS at 23:30

## 2018-03-07 RX ADMIN — CALCITRIOL 0.25 MCG: 0.25 CAPSULE, LIQUID FILLED ORAL at 09:07

## 2018-03-07 RX ADMIN — CARVEDILOL 12.5 MG: 12.5 TABLET, FILM COATED ORAL at 17:32

## 2018-03-07 RX ADMIN — INSULIN ASPART 3 UNITS: 100 INJECTION, SOLUTION INTRAVENOUS; SUBCUTANEOUS at 21:05

## 2018-03-07 RX ADMIN — ATORVASTATIN CALCIUM 10 MG: 10 TABLET, FILM COATED ORAL at 09:07

## 2018-03-07 RX ADMIN — Medication 1.5 MG: at 17:32

## 2018-03-07 RX ADMIN — MORPHINE SULFATE 1 MG: 2 INJECTION, SOLUTION INTRAMUSCULAR; INTRAVENOUS at 06:05

## 2018-03-07 RX ADMIN — INSULIN ASPART 5 UNITS: 100 INJECTION, SOLUTION INTRAVENOUS; SUBCUTANEOUS at 17:32

## 2018-03-07 RX ADMIN — CLONIDINE HYDROCHLORIDE 0.2 MG: 0.2 TABLET ORAL at 09:07

## 2018-03-07 RX ADMIN — HYDROCODONE BITARTRATE AND ACETAMINOPHEN 1 TABLET: 7.5; 325 TABLET ORAL at 15:26

## 2018-03-07 RX ADMIN — CEFTRIAXONE 1 G: 1 INJECTION, POWDER, FOR SOLUTION INTRAMUSCULAR; INTRAVENOUS at 00:29

## 2018-03-07 RX ADMIN — SODIUM CHLORIDE 75 ML/HR: 9 INJECTION, SOLUTION INTRAVENOUS at 09:54

## 2018-03-07 RX ADMIN — CLORAZEPATE DIPOTASSIUM 3.75 MG: 3.75 TABLET ORAL at 21:05

## 2018-03-07 RX ADMIN — CLONIDINE HYDROCHLORIDE 0.4 MG: 0.2 TABLET ORAL at 21:05

## 2018-03-07 RX ADMIN — HYDROCODONE BITARTRATE AND ACETAMINOPHEN 1 TABLET: 7.5; 325 TABLET ORAL at 05:01

## 2018-03-07 RX ADMIN — ASPIRIN 81 MG: 81 TABLET, COATED ORAL at 09:08

## 2018-03-07 RX ADMIN — HEPARIN SODIUM 5000 UNITS: 5000 INJECTION, SOLUTION INTRAVENOUS; SUBCUTANEOUS at 09:08

## 2018-03-07 RX ADMIN — CARVEDILOL 12.5 MG: 12.5 TABLET, FILM COATED ORAL at 09:07

## 2018-03-07 RX ADMIN — INSULIN ASPART 8 UNITS: 100 INJECTION, SOLUTION INTRAVENOUS; SUBCUTANEOUS at 12:21

## 2018-03-07 RX ADMIN — LEVOTHYROXINE SODIUM 75 MCG: 75 TABLET ORAL at 09:49

## 2018-03-07 RX ADMIN — PANTOPRAZOLE SODIUM 40 MG: 40 TABLET, DELAYED RELEASE ORAL at 09:07

## 2018-03-07 RX ADMIN — POTASSIUM CHLORIDE 20 MEQ: 750 CAPSULE, EXTENDED RELEASE ORAL at 09:54

## 2018-03-07 NOTE — PLAN OF CARE
Problem: Patient Care Overview (Adult)  Goal: Plan of Care Review  Outcome: Ongoing (interventions implemented as appropriate)   03/07/18 0733   Coping/Psychosocial Response Interventions   Plan Of Care Reviewed With patient;spouse   Patient Care Overview   Progress no change   Outcome Evaluation   Outcome Summary/Follow up Plan pt stable entire shift. Vitals WNL. Urine output adeqaute, using bedpan. UTI present - started antibiotics. Pain medication given for leg pain.Patient is very argumentative and verbally abusive to staff, states she wants to leave several times during shift but has no ride home.      Goal: Adult Individualization and Mutuality  Outcome: Ongoing (interventions implemented as appropriate)    Goal: Discharge Needs Assessment  Outcome: Ongoing (interventions implemented as appropriate)      Problem: Renal Failure/Kidney Injury, Acute (Adult)  Goal: Signs and Symptoms of Listed Potential Problems Will be Absent or Manageable (Renal Failure/Kidney Injury, Acute)  Outcome: Ongoing (interventions implemented as appropriate)      Problem: Fall Risk (Adult)  Goal: Identify Related Risk Factors and Signs and Symptoms  Outcome: Outcome(s) achieved Date Met: 03/07/18    Goal: Absence of Falls  Outcome: Ongoing (interventions implemented as appropriate)      Problem: Pressure Ulcer Risk (Foreign Scale) (Adult,Obstetrics,Pediatric)  Goal: Identify Related Risk Factors and Signs and Symptoms  Outcome: Outcome(s) achieved Date Met: 03/07/18    Goal: Skin Integrity  Outcome: Ongoing (interventions implemented as appropriate)      Problem: Pain, Chronic (Adult)  Goal: Identify Related Risk Factors and Signs and Symptoms  Outcome: Outcome(s) achieved Date Met: 03/07/18    Goal: Acceptable Pain Control/Comfort Level  Outcome: Ongoing (interventions implemented as appropriate)

## 2018-03-07 NOTE — CONSULTS
"Adult Nutrition  Assessment    Patient Name:  Dorcas Bain  YOB: 1940  MRN: [id number]  Admit Date:  3/6/2018    Assessment Date:  3/7/2018    Comments:  RD visited due to calculated incorrect BMI based on ht of 72\" instead of her ht of 62\".  Her actual BMI based on correct ht and wt upon admit is 23.59.  She reports a decreased in appetite over the past year when illness with resulting wt loss of 10#.  She has a hx of CHF and DM.  She reports \"fair\" dietary compliance.  RD provided diet education on Low sodium nutrition; Diabetes and Nutrition; and fluid restrictions.  Diet copy and contact numbers provided.                  Anthropometrics       03/07/18 1348    Anthropometrics    Height Method Stated    Height 157.5 cm (62\")    Weight 58.5 kg (129 lb)    Anthropometrics (Special Considerations)    RD Calculated     RD Calculated %     Ideal Body Weight (IBW)    Ideal Body Weight (IBW), Female 50.83    % Ideal Body Weight 115.35    Usual Body Weight (UBW)    Weight Loss 4.536 kg (10 lb)    Weight Loss Time Frame Over the past year    Body Mass Index (BMI)    BMI (kg/m2) 23.64    BMI Grade 19.1 - 24.9 - normal              Labs/Tests/Procedures/Meds       03/07/18 1350    Labs/Tests/Procedures/Meds    Labs/Tests Review K+;Glucose;BUN;Creat    Medication Review Insulin              Estimated/Assessed Needs       03/07/18 1350    Calculation Measurements    Weight Used For Calculations 58.5 kg (129 lb)    Height Used for Calculations 1.575 m (5' 2\")    Estimated/Assessed Energy Needs    Energy Need Method Mono-St Jeor    Age 77    RMR (Mono-St. Jeor Equation) 1028.39    Activity Factors (Mono St Jeor)  Out of bed, ambulatory  1.3    Total estimated needs (Mono St. Jeor) 1335    Estimated/Assessed Protein Needs    Weight Used for Protein Calculation 58.5 kg (129 lb)    Protein (gm/kg) 0.8    0.8 Gm Protein (gm) 46.81    Estimated Protein Range 47    Estimated/Assessed Fluid " Needs    Fluid Need Method Other (comment)   1500cc            Nutrition Prescription Ordered       03/07/18 1351    Nutrition Prescription PO    Current PO Diet Regular    Fluid Consistency Thin            Evaluation of Received Nutrient/Fluid Intake       03/07/18 1351    PO Evaluation    Number of Days PO Intake Evaluated Insufficient Data            Electronically signed by:  Shea Hargrove RD  03/07/18 1:53 PM

## 2018-03-07 NOTE — PLAN OF CARE
Problem: Patient Care Overview (Adult)  Goal: Plan of Care Review  Outcome: Ongoing (interventions implemented as appropriate)   03/07/18 8862   Coping/Psychosocial Response Interventions   Plan Of Care Reviewed With patient;spouse   Patient Care Overview   Progress improving   Outcome Evaluation   Outcome Summary/Follow up Plan VSS, pt with adequate UOP. Pt more pleasant this shift than previous, no aggressive behaviors noted,. Transfer orders in place- waiting for bed placement.        Problem: Renal Failure/Kidney Injury, Acute (Adult)  Goal: Signs and Symptoms of Listed Potential Problems Will be Absent or Manageable (Renal Failure/Kidney Injury, Acute)  Outcome: Ongoing (interventions implemented as appropriate)      Problem: Fall Risk (Adult)  Goal: Absence of Falls  Outcome: Ongoing (interventions implemented as appropriate)      Problem: Pressure Ulcer Risk (Foreign Scale) (Adult,Obstetrics,Pediatric)  Goal: Skin Integrity  Outcome: Ongoing (interventions implemented as appropriate)      Problem: Pain, Chronic (Adult)  Goal: Acceptable Pain Control/Comfort Level  Outcome: Ongoing (interventions implemented as appropriate)

## 2018-03-07 NOTE — H&P
[facility] ER Medicine Admission      Date of Admission: 3/6/2018      Primary Care Physician: Markell Muller MD      Chief Complaint:  Leg pain    HPI:  Patient was adversarial during the interview, so I was unable to obtain the level of detail that I would have liked to obtain.  Patient was primarily concerned about using the bedside commode instead of the bedpan, signing out AMA, and seeing only Dr. Osorio.  I was able to gather from the chart that the patient had been started on Entresto, but had worsening leg pain.  She has chronic claudication.  She called her physician in [place] and was told to stop the Entresto and double up on her lasix.  After she increased her lasix dose, she developed worsening pain and went to the ED in [facility].  She was found to have MELODY on CKD, a UTI, and an elevated troponin.  She was transferred to our service for further care.    Concurrent Medical History:  has a past medical history of CHF (congestive heart failure); Diabetes mellitus; GERD (gastroesophageal reflux disease); Hypercholesteremia; and Hypertension.    Past Surgical History:  has no past surgical history on file.    Family History:  Patient declined to answer    Social History:  Patient declined to answer    Allergies:   Allergies   Allergen Reactions   • Cymbalta [Duloxetine Hcl] GI Intolerance   • Lipitor [Atorvastatin] GI Intolerance   • Vytorin [Ezetimibe-Simvastatin] GI Intolerance       Medications:   Prescriptions Prior to Admission   Medication Sig Dispense Refill Last Dose   • aspirin 81 MG EC tablet Take 81 mg by mouth Daily. Medical record from transferring hospital      • calcitriol (ROCALTROL) 0.25 MCG capsule Take 0.25 mcg by mouth Daily. Medical record from transferring hospital      • carvedilol (COREG) 12.5 MG tablet Take 12.5 mg by mouth 2 (Two) Times a Day With Meals.      • CloNIDine (CATAPRES) 0.2 MG tablet Take 0.2 mg by mouth  Daily. Daily with breakfast      • CloNIDine (CATAPRES) 0.2 MG tablet Take 0.4 mg by mouth Every Night.      • clorazepate (TRANXENE) 3.75 MG tablet Take 3.75 mg by mouth Every Night.          Review of Systems:  Review of Systems   Unable to perform ROS: Other   Patient declined       Physical Exam:      Physical Exam   Constitutional: She is oriented to person, place, and time. She appears well-developed and well-nourished.   HENT:   Head: Normocephalic and atraumatic.   Nose: Nose normal.   Mouth/Throat: Oropharynx is clear and moist.   Eyes: Conjunctivae, EOM and lids are normal. Pupils are equal, round, and reactive to light. No scleral icterus.   Neck: Normal range of motion. Neck supple. No JVD present. No tracheal tenderness and no spinous process tenderness present. No rigidity. No tracheal deviation, no edema and normal range of motion present. No thyromegaly present.   Cardiovascular: Normal rate, regular rhythm, S1 normal, S2 normal, normal heart sounds and normal pulses.  Exam reveals no gallop and no friction rub.    No murmur heard.  Pulmonary/Chest: Effort normal and breath sounds normal. No accessory muscle usage. No respiratory distress. She has no decreased breath sounds. She has no wheezes. She has no rales. She exhibits no tenderness.   Abdominal: Soft. Bowel sounds are normal. She exhibits no distension and no mass. There is no tenderness. There is no rebound and no guarding.   Musculoskeletal: She exhibits no edema or tenderness.        Right shoulder: She exhibits normal range of motion, no tenderness and no pain.   Lymphadenopathy:     She has no cervical adenopathy.   Neurological: She is alert and oriented to person, place, and time. She exhibits normal muscle tone. She displays no seizure activity.   Patient very argumentative and confrontational.  Not at all cooperative.   Skin: Skin is warm. No rash noted. No pallor.   Chronic changes on bilateral lower extremities   Psychiatric:  Judgment and thought content normal. Her affect is angry.   Patient very argumentative and confrontational.  Not at all cooperative.         Results Reviewed:  I have personally reviewed current lab, radiology, and data and agree with results.  Lab Results (last 24 hours)     ** No results found for the last 24 hours. **        Imaging Results (last 24 hours)     ** No results found for the last 24 hours. **            Assessment:    Hospital Problem List     MELODY (acute kidney injury)                Plan:  1.  MELODY:  Seems prerenal due to volume depletion.  She was taking lasix 80mg bid and zaroxolyn 2.5 mg prn.  Will hold diuretics and gently hydrate.  I have discussed with Dr. Leon.  He will see in consultation.  2.  UTI:  Culture pending.  Will treat empirically.  3.  HTN:  Continue home meds except lisinopril.  4.  Chronic PAD:  Home pain meds.  5.  DM:  SSI  6.  DVT Prophylaxis:  Heparin      I discussed the patients findings and my recommendations with:  Patient and family        This document has been electronically signed by Bob Arriaga MD on March 6, 2018 11:14 PM

## 2018-03-07 NOTE — PROGRESS NOTES
"Anticoagulation by Pharmacy - Warfarin    Dorcas Bain is a 77 y.o.female  [Ht: 157.5 cm (62\"); Wt: 58.5 kg (129 lb)] on Warfarin 1.5 mg PO  for indication of Cardiac dysrhythmia.    Goal INR: 2-3  Today's INR:   Lab Results   Component Value Date    INR 2.19 (H) 03/07/2018         Lab Results   Component Value Date    INR 2.19 (H) 03/07/2018    PROTIME 23.4 (H) 03/07/2018     Lab Results   Component Value Date    HGB 10.8 (L) 03/07/2018    HGB 11.5 (L) 03/06/2018     Lab Results   Component Value Date    HCT 32.3 (L) 03/07/2018    HCT 34.2 (L) 03/06/2018     Assessment/Plan:  INR is currently therapeutic.  Will start patient on warfarin 1.5 mg nightly as this will provide the patient with the same weekly dose that she takes at home.  Patient also takes aspirin.  Will follow.    Viktor Gibson III, [facility]  03/07/18 2:23 PM     "

## 2018-03-07 NOTE — CONSULTS
Nephrology Consultation:    Presenting complaints: Acute renal failure    77-year-old patient with a history of stage IV chronic kidney disease, congestive heart failure, hypertension, type 2 diabetes, gout, anemia of chronic kidney disease, seen for evaluation of acute kidney injury.  The patient is well known to me from clinic visits in [place].  More recently patient was placed on Entreso by her cardiologist in [place].  Patient noticed some itching, but was able to tolerate the medication initially.  Patient was on losartan prior to that which was discontinued when the new medication was started.  She had a BMP checked since then and showed fairly stable renal functions.  On her recent visit to cardiology in [place], the dose of interest was doubled up.  Patient called her cardiologist with complaints of excessive fatigue and weakness, and increased itching, and the medication was subsequently discontinued.  At that time she complained of increased edema of the lower extremities, and the dose of Lasix was doubled.  She was taking Lasix 80 mg p.o. twice a day, and apparently the dose was doubled since then.  Patient presented with complaints of leg discomfort, generalized fatigue and weakness, and initially went to [facility], where lab and was occasions showed acute renal failure with a BUN of 96 and a creatinine of 4.34, subsequently transferred to [facility].    At the time of my examination, patient complains of fatigue and weakness.  She denies any shortness of breath.  Her  is at bedside and complained of increased edema of the lower extremities.  She refused to use the bedside commode, wanted to ambulate to the bathroom.  I discussed with the hospitalist team, who thought it was not safe for her to ambulate at this time.  I encouraged the patient to use a bedside commode, and to have the nurses help her if she needed to use the commode.  She denies any chest pain or shortness  of breath at this time.  No nausea or vomiting.  Urine output has been stable according to patient and her .  They have not noticed any bleeding per rectum or melena.  There is a history of GI bleeding related to the use of NSAIDs in the past.    Past medical illness:  Patient Active Problem List   Diagnosis   • MELODY (acute kidney injury)     Past Surgical History:   Procedure Laterality Date   • CORONARY ARTERY BYPASS GRAFT     • RENAL ARTERY STENT         Social history:    Social History     Social History   • Marital status:      Spouse name: N/A   • Number of children: N/A   • Years of education: N/A     Occupational History   • Not on file.     Social History Main Topics   • Smoking status: Never Smoker   • Smokeless tobacco: Never Used   • Alcohol use No   • Drug use: No   • Sexual activity: No     Other Topics Concern   • Not on file     Social History Narrative       Family history:  No family history on file.    Review of systems:  Positive information is included in the history of present illness.  Patient wanted to get up to use the bathroom, and was upset that she had to use the bedside commode.  She did not offer much complaints, but complains of fatigue and weakness.  No shortness of breath.  No bleeding per rectum or melena.  Denies any abdominal pain or vomiting.  Urine output has been stable according to them.  She takes Lasix twice a day and metolazone once or twice a week depending on edema of the lower extremities.  Apparently the dose of Lasix has been doubled by her cardiologist recently.  Appetite has been fair.  There is a history of arthralgia, NSAIDs were previously discontinued.  Her baseline serum creatinine is around 2.4-2.8      Medication list:    No current facility-administered medications on file prior to encounter.      No current outpatient prescriptions on file prior to encounter.     Scheduled Meds:  aspirin 81 mg Oral Daily   atorvastatin 10 mg Oral Daily  "  calcitriol 0.25 mcg Oral Daily   carvedilol 12.5 mg Oral BID With Meals   ceftriaxone 1 g Intravenous Q24H   CloNIDine 0.2 mg Oral Daily   CloNIDine 0.4 mg Oral Nightly   clorazepate 3.75 mg Oral Nightly   insulin aspart 0-14 Units Subcutaneous 4x Daily AC & at Bedtime   levothyroxine 75 mcg Oral Daily   pantoprazole 40 mg Oral QAM   warfarin 1.5 mg Oral Daily     Continuous Infusions:  Pharmacy to dose warfarin     sodium chloride 75 mL/hr Last Rate: 75 mL/hr (03/07/18 0954)     PRN Meds:dextrose  •  dextrose  •  glucagon (human recombinant)  •  HYDROcodone-acetaminophen  •  Morphine  •  Pharmacy to dose warfarin  •  sodium chloride    Allergies:  Cymbalta [duloxetine hcl]; Lipitor [atorvastatin]; and Vytorin [ezetimibe-simvastatin]    On examination:  Vitals:    03/07/18 1300 03/07/18 1348 03/07/18 1400 03/07/18 1505   BP: 148/64  145/65 151/99   BP Location:       Patient Position:       Pulse: 76  76 75   Resp:    12   Temp:       TempSrc:       SpO2: 96%  96% 97%   Weight:  58.5 kg (129 lb)     Height:  157.5 cm (62\")       General:Alert and oriented, ICU bed 6,  is at bedside.  No distress  HEENT: pallor, no icterus, eye movements are normal  Chest: diminished breath sounds of the lung bases, no rales or wheezes audible  CVS:Heart sounds are irregular, there is no pericardial rub or gallop  Abdomen:Soft, distended, normal bowel sounds, no organomegaly.  No rebound or guarding  Extremities: 1+ edema of the lower extremities.  No asterixis.  No calf tenderness  Neuro:Patient is alert and oriented.  Baseline neurological status.  Alert and comfortable.  No asterixis  Mentation:Alert and oriented    Past medical illness, social history, medications, previous notes reviewed.       Laboratory tests:  Imaging Results (last 24 hours)     ** No results found for the last 24 hours. **          Recent Results (from the past 24 hour(s))   POC Glucose Once    Collection Time: 03/06/18 10:39 PM   Result Value Ref " Range    Glucose 220 (H) 70 - 130 mg/dL   Urinalysis With / Culture If Indicated - Urine, Clean Catch    Collection Time: 03/06/18 11:20 PM   Result Value Ref Range    Color, UA Yellow Yellow, Straw, Dark Yellow, Jennie    Appearance, UA Cloudy (A) Clear    pH, UA 5.5 5.0 - 9.0    Specific Gravity, UA 1.007 1.003 - 1.030    Glucose, UA Negative Negative    Ketones, UA Negative Negative    Bilirubin, UA Negative Negative    Blood, UA Small (1+) (A) Negative    Protein, UA 30 mg/dL (1+) (A) Negative    Leuk Esterase, UA Small (1+) (A) Negative    Nitrite, UA Negative Negative    Urobilinogen, UA 0.2 E.U./dL 0.2 - 1.0 E.U./dL   Urinalysis, Microscopic Only - Urine, Clean Catch    Collection Time: 03/06/18 11:20 PM   Result Value Ref Range    RBC, UA 3-5 (A) None Seen /HPF    WBC, UA 21-30 (A) None Seen, 0-2, 3-5 /HPF    Bacteria, UA 4+ (A) None Seen /HPF    Squamous Epithelial Cells, UA None Seen None Seen, 0-2 /HPF    Hyaline Casts, UA None Seen None Seen /LPF    Methodology Automated Microscopy    Urine Culture - Urine, Urine, Clean Catch    Collection Time: 03/06/18 11:20 PM   Result Value Ref Range    Urine Culture Culture in progress    CBC Auto Differential    Collection Time: 03/06/18 11:32 PM   Result Value Ref Range    WBC 11.17 (H) 3.20 - 9.80 10*3/mm3    RBC 4.32 3.77 - 5.16 10*6/mm3    Hemoglobin 11.5 (L) 12.0 - 15.5 g/dL    Hematocrit 34.2 (L) 35.0 - 45.0 %    MCV 79.2 (L) 80.0 - 98.0 fL    MCH 26.6 26.5 - 34.0 pg    MCHC 33.6 31.4 - 36.0 g/dL    RDW 17.4 (H) 11.5 - 14.5 %    RDW-SD 49.9 (H) 36.4 - 46.3 fl    MPV 11.2 8.0 - 12.0 fL    Platelets 323 150 - 450 10*3/mm3    Neutrophil % 80.4 (H) 37.0 - 80.0 %    Lymphocyte % 11.4 10.0 - 50.0 %    Monocyte % 6.1 0.0 - 12.0 %    Eosinophil % 1.5 0.0 - 7.0 %    Basophil % 0.3 0.0 - 2.0 %    Immature Grans % 0.3 0.0 - 0.5 %    Neutrophils, Absolute 8.99 (H) 2.00 - 8.60 10*3/mm3    Lymphocytes, Absolute 1.27 0.60 - 4.20 10*3/mm3    Monocytes, Absolute 0.68 0.00 -  0.90 10*3/mm3    Eosinophils, Absolute 0.17 0.00 - 0.70 10*3/mm3    Basophils, Absolute 0.03 0.00 - 0.20 10*3/mm3    Immature Grans, Absolute 0.03 (H) 0.00 - 0.02 10*3/mm3   Comprehensive Metabolic Panel    Collection Time: 03/06/18 11:32 PM   Result Value Ref Range    Glucose 212 (H) 60 - 100 mg/dL    BUN 96 (H) 7 - 21 mg/dL    Creatinine 4.34 (H) 0.50 - 1.00 mg/dL    Sodium 141 137 - 145 mmol/L    Potassium 3.3 (L) 3.5 - 5.1 mmol/L    Chloride 95 95 - 110 mmol/L    CO2 24.0 22.0 - 31.0 mmol/L    Calcium 9.6 8.4 - 10.2 mg/dL    Total Protein 6.9 6.3 - 8.6 g/dL    Albumin 3.50 3.40 - 4.80 g/dL    ALT (SGPT) 29 9 - 52 U/L    AST (SGOT) 16 14 - 36 U/L    Alkaline Phosphatase 83 38 - 126 U/L    Total Bilirubin 0.6 0.2 - 1.3 mg/dL    eGFR Non  Amer 10 (L) 39 - 90 mL/min/1.73    Globulin 3.4 2.3 - 3.5 gm/dL    A/G Ratio 1.0 (L) 1.1 - 1.8 g/dL    BUN/Creatinine Ratio 22.1 7.0 - 25.0    Anion Gap 22.0 (H) 5.0 - 15.0 mmol/L   Troponin    Collection Time: 03/06/18 11:32 PM   Result Value Ref Range    Troponin I 0.045 (H) <=0.034 ng/mL   Troponin    Collection Time: 03/07/18  1:49 AM   Result Value Ref Range    Troponin I 0.048 (H) <=0.034 ng/mL   Basic Metabolic Panel    Collection Time: 03/07/18  1:49 AM   Result Value Ref Range    Glucose 206 (H) 60 - 100 mg/dL    BUN 99 (H) 7 - 21 mg/dL    Creatinine 4.17 (H) 0.50 - 1.00 mg/dL    Sodium 139 137 - 145 mmol/L    Potassium 3.1 (L) 3.5 - 5.1 mmol/L    Chloride 96 95 - 110 mmol/L    CO2 26.0 22.0 - 31.0 mmol/L    Calcium 9.7 8.4 - 10.2 mg/dL    eGFR Non African Amer 10 (L) >60 mL/min/1.73    BUN/Creatinine Ratio 23.7 7.0 - 25.0    Anion Gap 17.0 (H) 5.0 - 15.0 mmol/L   CBC Auto Differential    Collection Time: 03/07/18  1:49 AM   Result Value Ref Range    WBC 10.46 (H) 3.20 - 9.80 10*3/mm3    RBC 4.09 3.77 - 5.16 10*6/mm3    Hemoglobin 10.8 (L) 12.0 - 15.5 g/dL    Hematocrit 32.3 (L) 35.0 - 45.0 %    MCV 79.0 (L) 80.0 - 98.0 fL    MCH 26.4 (L) 26.5 - 34.0 pg    MCHC  33.4 31.4 - 36.0 g/dL    RDW 17.3 (H) 11.5 - 14.5 %    RDW-SD 49.6 (H) 36.4 - 46.3 fl    MPV 10.8 8.0 - 12.0 fL    Platelets 290 150 - 450 10*3/mm3    Neutrophil % 78.8 37.0 - 80.0 %    Lymphocyte % 12.3 10.0 - 50.0 %    Monocyte % 7.0 0.0 - 12.0 %    Eosinophil % 1.5 0.0 - 7.0 %    Basophil % 0.3 0.0 - 2.0 %    Immature Grans % 0.1 0.0 - 0.5 %    Neutrophils, Absolute 8.24 2.00 - 8.60 10*3/mm3    Lymphocytes, Absolute 1.29 0.60 - 4.20 10*3/mm3    Monocytes, Absolute 0.73 0.00 - 0.90 10*3/mm3    Eosinophils, Absolute 0.16 0.00 - 0.70 10*3/mm3    Basophils, Absolute 0.03 0.00 - 0.20 10*3/mm3    Immature Grans, Absolute 0.01 0.00 - 0.02 10*3/mm3   Troponin    Collection Time: 03/07/18  4:44 AM   Result Value Ref Range    Troponin I 0.054 (H) <=0.034 ng/mL   Protime-INR    Collection Time: 03/07/18  4:44 AM   Result Value Ref Range    Protime 23.4 (H) 11.1 - 15.3 Seconds    INR 2.19 (H) 0.80 - 1.20   Lavender Top    Collection Time: 03/07/18  4:44 AM   Result Value Ref Range    Extra Tube hold for add-on    Hemoglobin A1c    Collection Time: 03/07/18 11:34 AM   Result Value Ref Range    Hemoglobin A1C 8.8 (H) 4 - 5.6 %   ]      Impression:     Acute renal failure  Stage IV chronic disease  History of congestive heart failure  Edema of lower extremities  Arthralgia and history of gout  Possible urinary tract infection  History of GI bleeding in the past  Type 2 diabetes mellitus, uncontrolled  Hypothyroidism  Secondary hyperparathyroidism    Plan:       Acute renal failure on stage IV chronic kidney disease.  History of underlying stage IV chronic kidney disease with a baseline serum creatinine of 2.4-2.8.  Patient has recently been on Entresto, the dose was subsequently increased, and then discontinued due to itching and other side effects.  Subsequently the dose of Lasix was doubled.  On her previous outpatient evaluation, patient was taking Lasix 80 mg p.o. twice a day.    Current lab and visitations show significant  worsening of renal functions with a BUN of 99 and a creatinine of 4.1.  Last night her creatinine was 4.3.  Serum potassium is slightly on the lower side at 3.1.  Bicarbonate level is stable at 26.  Recent worsening of renal functions likely related to renal hypoperfusion from diuretic use, possible effects of other medications including Entresto.  Other etiologies to be evaluated.  Patient has a history of uncontrolled diabetes, history of GI bleeding in the past.    At this time serum potassium and bicarbonate levels are stable.  There is no evidence of volume overload.  No immediate indication for emergent dialysis.  Hopefully with conservative measures, her renal functions will improve back to her baseline.    Entres has been discontinued by cardiology.  Patient is not on angiotensin receptor blockers at this time.    Diuretics have been held for the time being.  No shortness of breath.  Patient is getting IV hydration, we will cut back to normal saline at 75 mL/h.  Intake and output to be closely monitored.  I will likely repeat a chest x-ray tomorrow.    Possible urinary tract infection: UA is positive.  Urine culture results to be monitored.  Currently on Rocephin.    History of atrial fibrillation, on chronic anticoagulations, history of LA thrombus in the past.  INR is therapeutic.  No active bleeding noted.  History of GI bleeding in the past.  Follow H&H.    Current blood pressure readings are stable.  Patient is on carvedilol, clonidine.  In the past amlodipine caused significant edema, which had been discontinued.  Would prefer not to restart ACE inhibitors or ARB at this time due to acute kidney injury.  If needed, we will either increase the dose of carvedilol if heart rate permits, or add hydralazine.    Type 2 diabetes: On insulin.  Avoid metformin due to worsening renal functions.  Follow hemoglobin A1c    History of congestive heart failure: Closely monitor for any evidence of pulmonary  congestion.  No significant edema at this time.  On IV hydration.    History of anemia of chronic disease, has been getting erythropoietin injections from hematology in [place].  Current hemoglobin is 10.8 with a hematocrit of 32.3.    Discussed with patient and family in detail.  Medications were reviewed, no nephrotoxins.  Intake and output to be monitored.  Discussed with the hospitalist team.        Devon Downing MD

## 2018-03-07 NOTE — PROGRESS NOTES
[facility] Medicine Services  INPATIENT PROGRESS NOTE     LOS: 1 day   Patient Care Team:  Markell Muller MD as PCP - General    Chief Complaint:  <principal problem not specified>      Subjective     Interval History:   Patient is seen for follow-up today.  She was admitted yesterday for acute on chronic kidney injury and UTI.  She is doing well, tolerating prescribed diet and voices no complaints.  Her urinary output remains adequate.  Patient Complaints: As above    History taken from: Patient and     Review of Systems:    Review of Systems   Constitutional: Negative for activity change, appetite change, chills, diaphoresis, fatigue and fever.   HENT: Negative for trouble swallowing and voice change.    Eyes: Negative for photophobia and visual disturbance.   Respiratory: Negative for cough, choking, chest tightness, shortness of breath, wheezing and stridor.    Cardiovascular: Negative for chest pain, palpitations and leg swelling.   Gastrointestinal: Negative for abdominal distention, abdominal pain, blood in stool, constipation, diarrhea, nausea and vomiting.   Endocrine: Negative for cold intolerance, heat intolerance, polydipsia, polyphagia and polyuria.   Genitourinary: Negative for decreased urine volume, difficulty urinating, dysuria, enuresis, flank pain, frequency, hematuria and urgency.   Musculoskeletal: Negative for arthralgias, gait problem, myalgias, neck pain and neck stiffness.   Skin: Negative for pallor, rash and wound.   Neurological: Negative for dizziness, tremors, seizures, syncope, facial asymmetry, speech difficulty, weakness, light-headedness, numbness and headaches.   Hematological: Does not bruise/bleed easily.   Psychiatric/Behavioral: Negative for agitation, behavioral problems and confusion.         Objective     Vital Signs  Temp:  [97.8 °F (36.6 °C)-99.3 °F (37.4 °C)] 97.8 °F (36.6 °C)  Heart Rate:  [76-85] 81  Resp:   [18-24] 22  BP: (126-179)/(51-73) 134/65    Physical Exam:   Physical Exam   Constitutional: She is oriented to person, place, and time. She appears well-developed and well-nourished. She is cooperative. No distress.   HENT:   Head: Normocephalic and atraumatic.   Right Ear: External ear normal.   Left Ear: External ear normal.   Nose: Nose normal.   Mouth/Throat: Oropharynx is clear and moist.   Eyes: Conjunctivae and EOM are normal. Pupils are equal, round, and reactive to light.   Neck: Normal range of motion. Neck supple. No JVD present. No thyromegaly present.   Cardiovascular: Normal rate, regular rhythm, normal heart sounds and intact distal pulses.  Exam reveals no gallop and no friction rub.    No murmur heard.  Pulmonary/Chest: Effort normal and breath sounds normal. No stridor. No respiratory distress. She has no wheezes. She has no rales. She exhibits no tenderness.   Abdominal: Soft. Bowel sounds are normal. She exhibits no distension and no mass. There is no tenderness. There is no rebound and no guarding. No hernia.   Musculoskeletal: Normal range of motion. She exhibits no edema, tenderness or deformity.   Neurological: She is alert and oriented to person, place, and time. She has normal reflexes.   Skin: Skin is warm and dry. No rash noted. She is not diaphoretic. No erythema. No pallor.   Psychiatric: She has a normal mood and affect. Her behavior is normal. Judgment and thought content normal.   Nursing note and vitals reviewed.         Results Review:       Results from last 7 days  Lab Units 03/07/18  0149 03/06/18  2332   SODIUM mmol/L 139 141   POTASSIUM mmol/L 3.1* 3.3*   CHLORIDE mmol/L 96 95   CO2 mmol/L 26.0 24.0   BUN mg/dL 99* 96*   CREATININE mg/dL 4.17* 4.34*   GLUCOSE mg/dL 206* 212*   CALCIUM mg/dL 9.7 9.6   BILIRUBIN mg/dL  --  0.6   ALK PHOS U/L  --  83   ALT (SGPT) U/L  --  29   AST (SGOT) U/L  --  16               Results from last 7 days  Lab Units 03/07/18  0149 03/06/18  2332    WBC 10*3/mm3 10.46* 11.17*   HEMOGLOBIN g/dL 10.8* 11.5*   HEMATOCRIT % 32.3* 34.2*   PLATELETS 10*3/mm3 290 323       Lab Results   Component Value Date    TROPONINI 0.054 (H) 03/07/2018       CO2   Date Value Ref Range Status   03/07/2018 26.0 22.0 - 31.0 mmol/L Final         Results from last 7 days  Lab Units 03/07/18  0444   INR  2.19*        Imaging Results (last 7 days)     ** No results found for the last 168 hours. **                           Urine Culture   Date Value Ref Range Status   03/06/2018 Culture in progress  Preliminary           Medication Review:   Current Facility-Administered Medications   Medication Dose Route Frequency Provider Last Rate Last Dose   • aspirin EC tablet 81 mg  81 mg Oral Daily Bob Arriaga MD   81 mg at 03/07/18 0908   • atorvastatin (LIPITOR) tablet 10 mg  10 mg Oral Daily Bob Arriaga MD   10 mg at 03/07/18 0907   • calcitriol (ROCALTROL) capsule 0.25 mcg  0.25 mcg Oral Daily Bob Arriaga MD   0.25 mcg at 03/07/18 0907   • carvedilol (COREG) tablet 12.5 mg  12.5 mg Oral BID With Meals Bob Arriaga MD   12.5 mg at 03/07/18 0907   • cefTRIAXone (ROCEPHIN) 1 g/100 mL 0.9% NS (MBP)  1 g Intravenous Q24H Bob Arriaga MD   1 g at 03/07/18 0029   • CloNIDine (CATAPRES) tablet 0.2 mg  0.2 mg Oral Daily Bob Arriaga MD   0.2 mg at 03/07/18 0907   • CloNIDine (CATAPRES) tablet 0.4 mg  0.4 mg Oral Nightly Bob Arriaga MD       • clorazepate (TRANXENE) tablet 3.75 mg  3.75 mg Oral Nightly Bob Arriaga MD       • heparin (porcine) 5000 UNIT/ML injection 5,000 Units  5,000 Units Subcutaneous Q12H Bob Arriaga MD   5,000 Units at 03/07/18 0908   • HYDROcodone-acetaminophen (NORCO) 7.5-325 MG per tablet 1 tablet  1 tablet Oral Q6H PRN Nathan Mariscal MD   1 tablet at 03/07/18 0501   • levothyroxine (SYNTHROID, LEVOTHROID) tablet 75 mcg  75 mcg Oral Daily Bob Arriaga MD   75 mcg at 03/07/18 0983   • morphine injection 1 mg  1 mg  Intravenous Q4H PRN Bob Arriaga MD   1 mg at 03/07/18 0605   • pantoprazole (PROTONIX) EC tablet 40 mg  40 mg Oral QAM Bob Arriaga MD   40 mg at 03/07/18 0907   • Pharmacy to dose warfarin   Does not apply Continuous PRN Bob Arriaga MD       • sodium chloride 0.9 % flush 1-10 mL  1-10 mL Intravenous PRN Bob Arriaga MD       • sodium chloride 0.9 % infusion  75 mL/hr Intravenous Continuous Devon MD Chang 75 mL/hr at 03/07/18 0954 75 mL/hr at 03/07/18 0954         Assessment/Plan   - Acute on chronic kidney injury: Slowly improving as creatinine is down to 4.17 today.  Continue gentle IV hydration, monitoring of renal function and avoid nephrotoxins.  Nephrologist is following.  - UTI: Continue IV Rocephin. Urine culture is pending.  - Hypertension: Stable. continue home medications.  - Hypokalemia: Replete and monitor.  - Diabetes mellitus: Continue Accu-Cheks and sliding scale insulin.  - History of CHF: Stable.  - Continue GI and DVT prophylaxis.  - Transfer out of the ICU today.          Nathan Mariscal MD  03/07/18      EMR Dragon/Transcription disclaimer:   Much of this encounter note is an electronic transcription/translation of spoken language to printed text. The electronic translation of spoken language may permit erroneous, or at times, nonsensical words or phrases to be inadvertently transcribed; Although I have reviewed the note for such errors, some may still exist.

## 2018-03-07 NOTE — NURSING NOTE
Patients O2 will drop in the 80's at times, educated patient on importance of wearing a nasal cannula, patient refused oxygen therapy. Patient states 80's is fine.

## 2018-03-08 LAB
ANION GAP SERPL CALCULATED.3IONS-SCNC: 17 MMOL/L (ref 5–15)
BASOPHILS # BLD AUTO: 0.02 10*3/MM3 (ref 0–0.2)
BASOPHILS NFR BLD AUTO: 0.2 % (ref 0–2)
BUN BLD-MCNC: 77 MG/DL (ref 7–21)
BUN/CREAT SERPL: 24.4 (ref 7–25)
CALCIUM SPEC-SCNC: 9.6 MG/DL (ref 8.4–10.2)
CHLORIDE SERPL-SCNC: 100 MMOL/L (ref 95–110)
CO2 SERPL-SCNC: 24 MMOL/L (ref 22–31)
CREAT BLD-MCNC: 3.15 MG/DL (ref 0.5–1)
DEPRECATED RDW RBC AUTO: 53 FL (ref 36.4–46.3)
EOSINOPHIL # BLD AUTO: 0.25 10*3/MM3 (ref 0–0.7)
EOSINOPHIL NFR BLD AUTO: 1.9 % (ref 0–7)
ERYTHROCYTE [DISTWIDTH] IN BLOOD BY AUTOMATED COUNT: 17.7 % (ref 11.5–14.5)
GFR SERPL CREATININE-BSD FRML MDRD: 14 ML/MIN/1.73 (ref 60–90)
GLUCOSE BLD-MCNC: 189 MG/DL (ref 60–100)
GLUCOSE BLDC GLUCOMTR-MCNC: 199 MG/DL (ref 70–130)
GLUCOSE BLDC GLUCOMTR-MCNC: 218 MG/DL (ref 70–130)
GLUCOSE BLDC GLUCOMTR-MCNC: 233 MG/DL (ref 70–130)
GLUCOSE BLDC GLUCOMTR-MCNC: 247 MG/DL (ref 70–130)
HCT VFR BLD AUTO: 33 % (ref 35–45)
HGB BLD-MCNC: 11 G/DL (ref 12–15.5)
IMM GRANULOCYTES # BLD: 0.05 10*3/MM3 (ref 0–0.02)
IMM GRANULOCYTES NFR BLD: 0.4 % (ref 0–0.5)
INR PPP: 2.05 (ref 0.8–1.2)
LYMPHOCYTES # BLD AUTO: 1.33 10*3/MM3 (ref 0.6–4.2)
LYMPHOCYTES NFR BLD AUTO: 10.2 % (ref 10–50)
MCH RBC QN AUTO: 27 PG (ref 26.5–34)
MCHC RBC AUTO-ENTMCNC: 33.3 G/DL (ref 31.4–36)
MCV RBC AUTO: 81.1 FL (ref 80–98)
MONOCYTES # BLD AUTO: 1.01 10*3/MM3 (ref 0–0.9)
MONOCYTES NFR BLD AUTO: 7.8 % (ref 0–12)
NEUTROPHILS # BLD AUTO: 10.33 10*3/MM3 (ref 2–8.6)
NEUTROPHILS NFR BLD AUTO: 79.5 % (ref 37–80)
NRBC BLD MANUAL-RTO: 0 /100 WBC (ref 0–0)
PLATELET # BLD AUTO: 320 10*3/MM3 (ref 150–450)
PMV BLD AUTO: 10.6 FL (ref 8–12)
POTASSIUM BLD-SCNC: 2.9 MMOL/L (ref 3.5–5.1)
PROTHROMBIN TIME: 22.3 SECONDS (ref 11.1–15.3)
RBC # BLD AUTO: 4.07 10*6/MM3 (ref 3.77–5.16)
SODIUM BLD-SCNC: 141 MMOL/L (ref 137–145)
WBC NRBC COR # BLD: 12.99 10*3/MM3 (ref 3.2–9.8)

## 2018-03-08 PROCEDURE — 82962 GLUCOSE BLOOD TEST: CPT

## 2018-03-08 PROCEDURE — 25010000002 MORPHINE PER 10 MG: Performed by: HOSPITALIST

## 2018-03-08 PROCEDURE — 85025 COMPLETE CBC W/AUTO DIFF WBC: CPT | Performed by: HOSPITALIST

## 2018-03-08 PROCEDURE — 63710000001 INSULIN ASPART PER 5 UNITS: Performed by: INTERNAL MEDICINE

## 2018-03-08 PROCEDURE — 80048 BASIC METABOLIC PNL TOTAL CA: CPT | Performed by: HOSPITALIST

## 2018-03-08 PROCEDURE — 85610 PROTHROMBIN TIME: CPT | Performed by: HOSPITALIST

## 2018-03-08 RX ORDER — WARFARIN SODIUM 1 MG/1
1 TABLET ORAL
Status: DISCONTINUED | OUTPATIENT
Start: 2018-03-09 | End: 2018-03-13

## 2018-03-08 RX ORDER — POTASSIUM CHLORIDE 750 MG/1
40 CAPSULE, EXTENDED RELEASE ORAL EVERY 4 HOURS
Status: COMPLETED | OUTPATIENT
Start: 2018-03-08 | End: 2018-03-09

## 2018-03-08 RX ORDER — MAGNESIUM SULFATE HEPTAHYDRATE 40 MG/ML
2 INJECTION, SOLUTION INTRAVENOUS AS NEEDED
Status: DISCONTINUED | OUTPATIENT
Start: 2018-03-08 | End: 2018-03-13 | Stop reason: HOSPADM

## 2018-03-08 RX ORDER — MAGNESIUM SULFATE HEPTAHYDRATE 40 MG/ML
4 INJECTION, SOLUTION INTRAVENOUS AS NEEDED
Status: DISCONTINUED | OUTPATIENT
Start: 2018-03-08 | End: 2018-03-13 | Stop reason: HOSPADM

## 2018-03-08 RX ORDER — POTASSIUM CHLORIDE 7.45 MG/ML
10 INJECTION INTRAVENOUS
Status: DISCONTINUED | OUTPATIENT
Start: 2018-03-08 | End: 2018-03-10

## 2018-03-08 RX ORDER — MAGNESIUM SULFATE 1 G/100ML
1 INJECTION INTRAVENOUS AS NEEDED
Status: DISCONTINUED | OUTPATIENT
Start: 2018-03-08 | End: 2018-03-13 | Stop reason: HOSPADM

## 2018-03-08 RX ORDER — WARFARIN SODIUM 2.5 MG/1
2.5 TABLET ORAL
Status: DISCONTINUED | OUTPATIENT
Start: 2018-03-08 | End: 2018-03-13

## 2018-03-08 RX ADMIN — CLONIDINE HYDROCHLORIDE 0.2 MG: 0.2 TABLET ORAL at 08:24

## 2018-03-08 RX ADMIN — WARFARIN SODIUM 2.5 MG: 2.5 TABLET ORAL at 17:36

## 2018-03-08 RX ADMIN — CLORAZEPATE DIPOTASSIUM 3.75 MG: 3.75 TABLET ORAL at 21:20

## 2018-03-08 RX ADMIN — LEVOTHYROXINE SODIUM 75 MCG: 75 TABLET ORAL at 08:24

## 2018-03-08 RX ADMIN — CLONIDINE HYDROCHLORIDE 0.4 MG: 0.2 TABLET ORAL at 21:20

## 2018-03-08 RX ADMIN — MORPHINE SULFATE 1 MG: 2 INJECTION, SOLUTION INTRAMUSCULAR; INTRAVENOUS at 01:09

## 2018-03-08 RX ADMIN — CARVEDILOL 12.5 MG: 12.5 TABLET, FILM COATED ORAL at 08:23

## 2018-03-08 RX ADMIN — INSULIN ASPART 3 UNITS: 100 INJECTION, SOLUTION INTRAVENOUS; SUBCUTANEOUS at 08:37

## 2018-03-08 RX ADMIN — INSULIN ASPART 5 UNITS: 100 INJECTION, SOLUTION INTRAVENOUS; SUBCUTANEOUS at 12:20

## 2018-03-08 RX ADMIN — CALCITRIOL 0.25 MCG: 0.25 CAPSULE, LIQUID FILLED ORAL at 08:24

## 2018-03-08 RX ADMIN — SODIUM CHLORIDE 75 ML/HR: 9 INJECTION, SOLUTION INTRAVENOUS at 14:16

## 2018-03-08 RX ADMIN — INSULIN ASPART 5 UNITS: 100 INJECTION, SOLUTION INTRAVENOUS; SUBCUTANEOUS at 17:41

## 2018-03-08 RX ADMIN — ATORVASTATIN CALCIUM 10 MG: 10 TABLET, FILM COATED ORAL at 08:24

## 2018-03-08 RX ADMIN — HYDROCODONE BITARTRATE AND ACETAMINOPHEN 1 TABLET: 7.5; 325 TABLET ORAL at 23:34

## 2018-03-08 RX ADMIN — INSULIN ASPART 5 UNITS: 100 INJECTION, SOLUTION INTRAVENOUS; SUBCUTANEOUS at 21:19

## 2018-03-08 RX ADMIN — NYSTATIN: 100000 POWDER TOPICAL at 09:00

## 2018-03-08 RX ADMIN — ASPIRIN 81 MG: 81 TABLET, COATED ORAL at 08:25

## 2018-03-08 RX ADMIN — NYSTATIN: 100000 POWDER TOPICAL at 21:22

## 2018-03-08 RX ADMIN — HYDROCODONE BITARTRATE AND ACETAMINOPHEN 1 TABLET: 7.5; 325 TABLET ORAL at 03:48

## 2018-03-08 RX ADMIN — HYDROCODONE BITARTRATE AND ACETAMINOPHEN 1 TABLET: 7.5; 325 TABLET ORAL at 15:43

## 2018-03-08 RX ADMIN — POTASSIUM CHLORIDE 40 MEQ: 750 CAPSULE, EXTENDED RELEASE ORAL at 17:36

## 2018-03-08 RX ADMIN — PANTOPRAZOLE SODIUM 40 MG: 40 TABLET, DELAYED RELEASE ORAL at 08:24

## 2018-03-08 RX ADMIN — CARVEDILOL 12.5 MG: 12.5 TABLET, FILM COATED ORAL at 17:36

## 2018-03-08 RX ADMIN — POTASSIUM CHLORIDE 40 MEQ: 750 CAPSULE, EXTENDED RELEASE ORAL at 21:21

## 2018-03-08 NOTE — PROGRESS NOTES
Nephrology Progress Note:    Feels sleepy.  No shortness of breath.  Edema of the lower extremities is improved.  On IV fluids.  No shortness of breath.  Good urine output with no complaints of dysuria or hematuria    Patient Active Problem List   Diagnosis   • MELODY (acute kidney injury)       Medications:    aspirin 81 mg Oral Daily   atorvastatin 10 mg Oral Daily   calcitriol 0.25 mcg Oral Daily   carvedilol 12.5 mg Oral BID With Meals   ceftriaxone 1 g Intravenous Q24H   CloNIDine 0.2 mg Oral Daily   CloNIDine 0.4 mg Oral Nightly   clorazepate 3.75 mg Oral Nightly   insulin aspart 0-14 Units Subcutaneous 4x Daily AC & at Bedtime   levothyroxine 75 mcg Oral Daily   nystatin  Topical Q12H   pantoprazole 40 mg Oral QAM   potassium chloride 40 mEq Oral Q4H   [START ON 3/9/2018] warfarin 1 mg Oral Once per day on Sun Tue Wed Fri Sat   warfarin 2.5 mg Oral Once per day on Mon Thu       Pharmacy to dose warfarin     sodium chloride 75 mL/hr Last Rate: 75 mL/hr (03/08/18 1416)     Vitals:    03/08/18 0820 03/08/18 1110 03/08/18 1607 03/08/18 1640   BP: 154/70 137/62  140/70   BP Location:  Right arm  Right arm   Patient Position:  Lying  Lying   Pulse: 91 82 81 79   Resp: 18 18  18   Temp: 98 °F (36.7 °C) 97.9 °F (36.6 °C)  98 °F (36.7 °C)   TempSrc:  Axillary  Axillary   SpO2: 92% 93%  92%   Weight:       Height:         I/O last 3 completed shifts:  In: 661.7 [I.V.:561.7; IV Piggyback:100]  Out: 3075 [Urine:3075]  I/O this shift:  In: 360 [P.O.:360]  Out: 700 [Urine:700]    On examination:  General:Resting comfortably, arousable, alert and oriented.   is at bedside  HEENT: Pallor, no icterus, eye movements are normal  Chest: Clear to auscultation.  No rales or wheezes audible  CVS: Heart sounds are irregular.  No pericardial rub or gallop  Abdomen: Distended, soft, normal bowel sounds, no organomegaly.  No rebound or guarding  Extremities: Trace edema of the lower ex midis, much improved  Neuro: Grade 4 power  upper and lower extremities.  No asterixis  Mentation: Alert and oriented    Past medical illness, social history, medications, previous notes reviewed.       Laboratory results:      Recent Results (from the past 24 hour(s))   POC Glucose Once    Collection Time: 03/07/18  9:03 PM   Result Value Ref Range    Glucose 181 (H) 70 - 130 mg/dL   POC Glucose Once    Collection Time: 03/08/18  7:32 AM   Result Value Ref Range    Glucose 199 (H) 70 - 130 mg/dL   Basic Metabolic Panel    Collection Time: 03/08/18  7:34 AM   Result Value Ref Range    Glucose 189 (H) 60 - 100 mg/dL    BUN 77 (H) 7 - 21 mg/dL    Creatinine 3.15 (H) 0.50 - 1.00 mg/dL    Sodium 141 137 - 145 mmol/L    Potassium 2.9 (L) 3.5 - 5.1 mmol/L    Chloride 100 95 - 110 mmol/L    CO2 24.0 22.0 - 31.0 mmol/L    Calcium 9.6 8.4 - 10.2 mg/dL    eGFR Non African Amer 14 (L) >60 mL/min/1.73    BUN/Creatinine Ratio 24.4 7.0 - 25.0    Anion Gap 17.0 (H) 5.0 - 15.0 mmol/L   Protime-INR    Collection Time: 03/08/18  7:34 AM   Result Value Ref Range    Protime 22.3 (H) 11.1 - 15.3 Seconds    INR 2.05 (H) 0.80 - 1.20   CBC Auto Differential    Collection Time: 03/08/18  7:34 AM   Result Value Ref Range    WBC 12.99 (H) 3.20 - 9.80 10*3/mm3    RBC 4.07 3.77 - 5.16 10*6/mm3    Hemoglobin 11.0 (L) 12.0 - 15.5 g/dL    Hematocrit 33.0 (L) 35.0 - 45.0 %    MCV 81.1 80.0 - 98.0 fL    MCH 27.0 26.5 - 34.0 pg    MCHC 33.3 31.4 - 36.0 g/dL    RDW 17.7 (H) 11.5 - 14.5 %    RDW-SD 53.0 (H) 36.4 - 46.3 fl    MPV 10.6 8.0 - 12.0 fL    Platelets 320 150 - 450 10*3/mm3    Neutrophil % 79.5 37.0 - 80.0 %    Lymphocyte % 10.2 10.0 - 50.0 %    Monocyte % 7.8 0.0 - 12.0 %    Eosinophil % 1.9 0.0 - 7.0 %    Basophil % 0.2 0.0 - 2.0 %    Immature Grans % 0.4 0.0 - 0.5 %    Neutrophils, Absolute 10.33 (H) 2.00 - 8.60 10*3/mm3    Lymphocytes, Absolute 1.33 0.60 - 4.20 10*3/mm3    Monocytes, Absolute 1.01 (H) 0.00 - 0.90 10*3/mm3    Eosinophils, Absolute 0.25 0.00 - 0.70 10*3/mm3     Basophils, Absolute 0.02 0.00 - 0.20 10*3/mm3    Immature Grans, Absolute 0.05 (H) 0.00 - 0.02 10*3/mm3    nRBC 0.0 0.0 - 0.0 /100 WBC   POC Glucose Once    Collection Time: 03/08/18 11:13 AM   Result Value Ref Range    Glucose 247 (H) 70 - 130 mg/dL   POC Glucose Once    Collection Time: 03/08/18  4:43 PM   Result Value Ref Range    Glucose 218 (H) 70 - 130 mg/dL   ]    Imaging Results (last 24 hours)     ** No results found for the last 24 hours. **            Assessment:     Acute renal failure  Stage IV chronic kidney disease, baseline serum creatinine around 2.5  Congestive heart failure, stable volume status  History of arthralgia, gout  Urinary tract infection  Anemia  Type 2 diabetes mellitus  Secondary hyperparathyroidism  History of GI bleeding in the past    Plan:     Acute renal failure, stage IV chronic kidney disease.  Baseline serum creatinine of 2.4-2.8.  Renal functions are improving.  Admission lab indiscretions with a serum creatinine close to 4.  Entresto and diuretics are on hold.  She had recently doubled up on her Lasix.  Volume status appears stable.  Urine output is good.      history of congestive heart failure and edema.  Diuretics are on hold.  I will cut back on fluids to 40 mL per hour.  Hopefully will be able to discontinue fluids by tomorrow.      Urinary tract infection: On Rocephin.  Urine cultures with gram-negative bacilli.  Identification and sensitivity to be followed.    Type 2 diabetes: Hemoglobin A1c was 8.8.  Patient is on insulin.  Not on metformin.  As proteinuria.    History of arthralgia and gout: No NSAIDs.    Hypokalemia: Serum potassium was 2.9.  Getting oral replacement.  Recheck serum potassium.  Check potassium in the morning, including magnesium levels.  Next    Discussed with patient and .        Devon Downing MD

## 2018-03-08 NOTE — PROGRESS NOTES
"Anticoagulation by Pharmacy - Warfarin    Dorcas Bain is a 77 y.o.female  [Ht: 157.5 cm (62\"); Wt: 58.5 kg (129 lb)] on Warfarin 1.5 mg PO  for indication of cardiac dysrhythmia  Home Regimen: 1 mg daily, except 2.5 mg on Mon/Th  Goal INR: 2-3  Today's INR:   Lab Results   Component Value Date    INR 2.05 (H) 03/08/2018         Lab Results   Component Value Date    INR 2.05 (H) 03/08/2018    INR 2.19 (H) 03/07/2018    PROTIME 22.3 (H) 03/08/2018    PROTIME 23.4 (H) 03/07/2018     Lab Results   Component Value Date    HGB 11.0 (L) 03/08/2018    HGB 10.8 (L) 03/07/2018    HGB 11.5 (L) 03/06/2018     Lab Results   Component Value Date    HCT 33.0 (L) 03/08/2018    HCT 32.3 (L) 03/07/2018    HCT 34.2 (L) 03/06/2018     Lab Results   Component Value Date     03/08/2018     03/07/2018     03/06/2018     Assessment/Plan:  INR is therapeutic - 2.05, slightly down from yesterday  H&H trended up  Received 1.5 mg yesterday  No significant interactions   Will resume patient's home regimen today to avoid confusion for patient at discharge  Warfarin 2.5 mg aki Guevara, [facility]  03/08/18 9:41 AM       "

## 2018-03-08 NOTE — PROGRESS NOTES
[facility]  INPATIENT PROGRESS NOTE    Length of Stay: 2  Date of Admission: 3/6/2018  Primary Care Physician: Markell Muller MD    Subjective   Chief Complaint/HPI:  This [age]  female was emitted to Hospital services secondary to multiple medical comorbidities.  Patient has a history of stage IV chronic kidney disease, congestive heart failure, hypertension, diabetes mellitus type 2, anemia of chronic kidney disease.  Patient was recently started on Entresto by her cardiologist.  Patient began to experience increased fatigue and weakness, itching, medication was discontinued.  At that time patient complained of increased edema of the lower extremities and patient was treated with double doses of Lasix.  Patient was taking Lasix 80 mg by mouth twice a day, meaning that this was the dose was doubled.  At the time of admission creatinine had risen to 4.34.    Patient also demonstrates gram negative bacilli in the urine.  This is likely Escherichia coli.  Currently on ceftriaxone.  At this time will order echocardiogram to further evaluate valvular function.  We will order PT and OT secondary to deconditioning.  Creatinine improving, patient denies any complaints at this time.    Review of Systems   Constitutional: Negative for chills and fever.   Respiratory: Negative for shortness of breath.    Cardiovascular: Negative for chest pain.   Gastrointestinal: Negative for abdominal pain, nausea and vomiting.   Neurological: Negative for dizziness and light-headedness.      All pertinent negatives and positives are as above. All other systems have been reviewed and are negative unless otherwise stated.     Objective    Temp:  [97.8 °F (36.6 °C)-99.5 °F (37.5 °C)] 97.9 °F (36.6 °C)  Heart Rate:  [64-91] 82  Resp:  [16-26] 18  BP: (130-158)/(60-70) 137/62    Physical Exam   Constitutional: She appears well-developed and well-nourished.   HENT:    Head: Normocephalic and atraumatic.   Cardiovascular: Normal rate and regular rhythm.    Pulmonary/Chest: Effort normal. No respiratory distress.   Abdominal: Soft. Bowel sounds are normal. She exhibits no distension. There is no tenderness.   Neurological: She is alert.   Skin: Skin is warm and dry.     Results Review:  I have reviewed the labs, radiology results, and diagnostic studies.    Laboratory Data:     Results from last 7 days  Lab Units 03/08/18  0734 03/07/18  0149 03/06/18  2332   SODIUM mmol/L 141 139 141   POTASSIUM mmol/L 2.9* 3.1* 3.3*   CHLORIDE mmol/L 100 96 95   CO2 mmol/L 24.0 26.0 24.0   BUN mg/dL 77* 99* 96*   CREATININE mg/dL 3.15* 4.17* 4.34*   GLUCOSE mg/dL 189* 206* 212*   CALCIUM mg/dL 9.6 9.7 9.6   BILIRUBIN mg/dL  --   --  0.6   ALK PHOS U/L  --   --  83   ALT (SGPT) U/L  --   --  29   AST (SGOT) U/L  --   --  16   ANION GAP mmol/L 17.0* 17.0* 22.0*     Estimated Creatinine Clearance: 14 mL/min (by C-G formula based on Cr of 3.15).            Results from last 7 days  Lab Units 03/08/18  0734 03/07/18  0149 03/06/18  2332   WBC 10*3/mm3 12.99* 10.46* 11.17*   HEMOGLOBIN g/dL 11.0* 10.8* 11.5*   HEMATOCRIT % 33.0* 32.3* 34.2*   PLATELETS 10*3/mm3 320 290 323       Results from last 7 days  Lab Units 03/08/18  0734 03/07/18  0444   INR  2.05* 2.19*       Culture Data:   No results found for: BLOODCX  Urine Culture   Date Value Ref Range Status   03/06/2018 >100,000 CFU/mL Gram Negative Bacilli (A)  Preliminary     No results found for: RESPCX  No results found for: WOUNDCX  No results found for: STOOLCX  No components found for: BODYFLD    Radiology Data:   Imaging Results (last 24 hours)     ** No results found for the last 24 hours. **          I have reviewed the patient current medications.     Assessment/Plan     Hospital Problem List     MELODY (acute kidney injury)          Plan:  Continue ceftriaxone, replete potassium, PT and OT, echocardiogram, continue to treat as hospital  course dictates.                This document has been electronically signed by SAMRA Mulligan on March 8, 2018 3:06 PM

## 2018-03-09 ENCOUNTER — APPOINTMENT (OUTPATIENT)
Dept: GENERAL RADIOLOGY | Facility: HOSPITAL | Age: 78
End: 2018-03-09

## 2018-03-09 ENCOUNTER — APPOINTMENT (OUTPATIENT)
Dept: CARDIOLOGY | Facility: HOSPITAL | Age: 78
End: 2018-03-09

## 2018-03-09 LAB
ANION GAP SERPL CALCULATED.3IONS-SCNC: 14 MMOL/L (ref 5–15)
BUN BLD-MCNC: 71 MG/DL (ref 7–21)
BUN/CREAT SERPL: 27.6 (ref 7–25)
CALCIUM SPEC-SCNC: 10.3 MG/DL (ref 8.4–10.2)
CHLORIDE SERPL-SCNC: 104 MMOL/L (ref 95–110)
CO2 SERPL-SCNC: 23 MMOL/L (ref 22–31)
CREAT BLD-MCNC: 2.57 MG/DL (ref 0.5–1)
GFR SERPL CREATININE-BSD FRML MDRD: 18 ML/MIN/1.73 (ref 60–90)
GLUCOSE BLD-MCNC: 206 MG/DL (ref 60–100)
GLUCOSE BLDC GLUCOMTR-MCNC: 202 MG/DL (ref 70–130)
GLUCOSE BLDC GLUCOMTR-MCNC: 211 MG/DL (ref 70–130)
GLUCOSE BLDC GLUCOMTR-MCNC: 223 MG/DL (ref 70–130)
GLUCOSE BLDC GLUCOMTR-MCNC: 270 MG/DL (ref 70–130)
INR PPP: 2.04 (ref 0.8–1.2)
POTASSIUM BLD-SCNC: 4.2 MMOL/L (ref 3.5–5.1)
POTASSIUM BLD-SCNC: 4.2 MMOL/L (ref 3.5–5.1)
PROTHROMBIN TIME: 22.2 SECONDS (ref 11.1–15.3)
SODIUM BLD-SCNC: 141 MMOL/L (ref 137–145)

## 2018-03-09 PROCEDURE — 87040 BLOOD CULTURE FOR BACTERIA: CPT | Performed by: HOSPITALIST

## 2018-03-09 PROCEDURE — 97162 PT EVAL MOD COMPLEX 30 MIN: CPT | Performed by: PHYSICAL THERAPIST

## 2018-03-09 PROCEDURE — 25010000002 CEFTRIAXONE PER 250 MG: Performed by: HOSPITALIST

## 2018-03-09 PROCEDURE — 71045 X-RAY EXAM CHEST 1 VIEW: CPT

## 2018-03-09 PROCEDURE — 82962 GLUCOSE BLOOD TEST: CPT

## 2018-03-09 PROCEDURE — G8988 SELF CARE GOAL STATUS: HCPCS

## 2018-03-09 PROCEDURE — 97166 OT EVAL MOD COMPLEX 45 MIN: CPT

## 2018-03-09 PROCEDURE — 93306 TTE W/DOPPLER COMPLETE: CPT

## 2018-03-09 PROCEDURE — 25010000002 MORPHINE PER 10 MG: Performed by: HOSPITALIST

## 2018-03-09 PROCEDURE — 85610 PROTHROMBIN TIME: CPT | Performed by: HOSPITALIST

## 2018-03-09 PROCEDURE — G8987 SELF CARE CURRENT STATUS: HCPCS

## 2018-03-09 PROCEDURE — 80048 BASIC METABOLIC PNL TOTAL CA: CPT | Performed by: HOSPITALIST

## 2018-03-09 PROCEDURE — 84132 ASSAY OF SERUM POTASSIUM: CPT | Performed by: HOSPITALIST

## 2018-03-09 PROCEDURE — G8978 MOBILITY CURRENT STATUS: HCPCS | Performed by: PHYSICAL THERAPIST

## 2018-03-09 PROCEDURE — 93306 TTE W/DOPPLER COMPLETE: CPT | Performed by: INTERNAL MEDICINE

## 2018-03-09 PROCEDURE — G8979 MOBILITY GOAL STATUS: HCPCS | Performed by: PHYSICAL THERAPIST

## 2018-03-09 PROCEDURE — 25010000002 FUROSEMIDE PER 20 MG: Performed by: INTERNAL MEDICINE

## 2018-03-09 PROCEDURE — 63710000001 INSULIN ASPART PER 5 UNITS: Performed by: INTERNAL MEDICINE

## 2018-03-09 RX ORDER — FUROSEMIDE 10 MG/ML
40 INJECTION INTRAMUSCULAR; INTRAVENOUS ONCE
Status: COMPLETED | OUTPATIENT
Start: 2018-03-09 | End: 2018-03-09

## 2018-03-09 RX ORDER — ACETAMINOPHEN 325 MG/1
650 TABLET ORAL EVERY 6 HOURS PRN
Status: DISCONTINUED | OUTPATIENT
Start: 2018-03-09 | End: 2018-03-13 | Stop reason: HOSPADM

## 2018-03-09 RX ADMIN — ASPIRIN 81 MG: 81 TABLET, COATED ORAL at 08:38

## 2018-03-09 RX ADMIN — WARFARIN SODIUM 1 MG: 1 TABLET ORAL at 17:19

## 2018-03-09 RX ADMIN — PANTOPRAZOLE SODIUM 40 MG: 40 TABLET, DELAYED RELEASE ORAL at 06:22

## 2018-03-09 RX ADMIN — HYDROCODONE BITARTRATE AND ACETAMINOPHEN 1 TABLET: 7.5; 325 TABLET ORAL at 19:40

## 2018-03-09 RX ADMIN — INSULIN ASPART 5 UNITS: 100 INJECTION, SOLUTION INTRAVENOUS; SUBCUTANEOUS at 22:10

## 2018-03-09 RX ADMIN — CALCITRIOL 0.25 MCG: 0.25 CAPSULE, LIQUID FILLED ORAL at 08:31

## 2018-03-09 RX ADMIN — HYDROCODONE BITARTRATE AND ACETAMINOPHEN 1 TABLET: 7.5; 325 TABLET ORAL at 11:34

## 2018-03-09 RX ADMIN — INSULIN ASPART 8 UNITS: 100 INJECTION, SOLUTION INTRAVENOUS; SUBCUTANEOUS at 17:19

## 2018-03-09 RX ADMIN — INSULIN ASPART 5 UNITS: 100 INJECTION, SOLUTION INTRAVENOUS; SUBCUTANEOUS at 08:38

## 2018-03-09 RX ADMIN — ATORVASTATIN CALCIUM 10 MG: 10 TABLET, FILM COATED ORAL at 08:30

## 2018-03-09 RX ADMIN — CLORAZEPATE DIPOTASSIUM 3.75 MG: 3.75 TABLET ORAL at 22:20

## 2018-03-09 RX ADMIN — ACETAMINOPHEN 650 MG: 325 TABLET ORAL at 01:31

## 2018-03-09 RX ADMIN — CARVEDILOL 12.5 MG: 12.5 TABLET, FILM COATED ORAL at 17:19

## 2018-03-09 RX ADMIN — LEVOTHYROXINE SODIUM 75 MCG: 75 TABLET ORAL at 08:30

## 2018-03-09 RX ADMIN — INSULIN ASPART 5 UNITS: 100 INJECTION, SOLUTION INTRAVENOUS; SUBCUTANEOUS at 11:25

## 2018-03-09 RX ADMIN — MORPHINE SULFATE 1 MG: 2 INJECTION, SOLUTION INTRAMUSCULAR; INTRAVENOUS at 03:42

## 2018-03-09 RX ADMIN — FUROSEMIDE 40 MG: 10 INJECTION, SOLUTION INTRAMUSCULAR; INTRAVENOUS at 11:25

## 2018-03-09 RX ADMIN — CEFTRIAXONE 1 G: 1 INJECTION, POWDER, FOR SOLUTION INTRAMUSCULAR; INTRAVENOUS at 00:27

## 2018-03-09 RX ADMIN — NYSTATIN: 100000 POWDER TOPICAL at 20:51

## 2018-03-09 RX ADMIN — CARVEDILOL 12.5 MG: 12.5 TABLET, FILM COATED ORAL at 08:31

## 2018-03-09 RX ADMIN — CLONIDINE HYDROCHLORIDE 0.2 MG: 0.2 TABLET ORAL at 08:31

## 2018-03-09 RX ADMIN — POTASSIUM CHLORIDE 40 MEQ: 750 CAPSULE, EXTENDED RELEASE ORAL at 01:32

## 2018-03-09 RX ADMIN — NYSTATIN: 100000 POWDER TOPICAL at 08:33

## 2018-03-09 RX ADMIN — CLONIDINE HYDROCHLORIDE 0.4 MG: 0.2 TABLET ORAL at 20:51

## 2018-03-09 NOTE — PLAN OF CARE
Problem: Patient Care Overview (Adult)  Goal: Plan of Care Review  Outcome: Ongoing (interventions implemented as appropriate)   03/09/18 0445   Coping/Psychosocial Response Interventions   Plan Of Care Reviewed With patient   Patient Care Overview   Progress no change   Outcome Evaluation   Outcome Summary/Follow up Plan vss, got new order for PRN Tylenol due to temperature; will continue to monitor     Goal: Adult Individualization and Mutuality  Outcome: Ongoing (interventions implemented as appropriate)    Goal: Discharge Needs Assessment  Outcome: Ongoing (interventions implemented as appropriate)      Problem: Renal Failure/Kidney Injury, Acute (Adult)  Goal: Signs and Symptoms of Listed Potential Problems Will be Absent or Manageable (Renal Failure/Kidney Injury, Acute)  Outcome: Ongoing (interventions implemented as appropriate)      Problem: Fall Risk (Adult)  Goal: Absence of Falls  Outcome: Ongoing (interventions implemented as appropriate)      Problem: Pressure Ulcer Risk (Foreign Scale) (Adult,Obstetrics,Pediatric)  Goal: Skin Integrity  Outcome: Ongoing (interventions implemented as appropriate)      Problem: Pain, Chronic (Adult)  Goal: Acceptable Pain Control/Comfort Level  Outcome: Ongoing (interventions implemented as appropriate)      Problem: Pressure Ulcer (Adult)  Goal: Signs and Symptoms of Listed Potential Problems Will be Absent or Manageable (Pressure Ulcer)  Outcome: Ongoing (interventions implemented as appropriate)

## 2018-03-09 NOTE — PLAN OF CARE
Problem: Patient Care Overview (Adult)  Goal: Plan of Care Review  Outcome: Outcome(s) achieved Date Met: 03/09/18 03/09/18 6040   Coping/Psychosocial Response Interventions   Plan Of Care Reviewed With patient;spouse   Patient Care Overview   Progress no change   Outcome Evaluation   Outcome Summary/Follow up Plan Initial care plan: Pt with slowly improving appetite. New pressure ulcer--stage 1 noted. Rd will monitor.

## 2018-03-09 NOTE — THERAPY EVALUATION
Acute Care - Occupational Therapy Initial Evaluation  [facility]     Patient Name: Dorcas Bain  : 1940  MRN: [id number]  Today's Date: 3/9/2018  Onset of Illness/Injury or Date of Surgery Date: 18  Date of Referral to OT: 18  Referring Physician: Ronda CABRALES    Admit Date: 3/6/2018       ICD-10-CM ICD-9-CM   1. Impaired physical mobility Z74.09 781.99   2. Impaired mobility and activities of daily living Z74.09 799.89     Patient Active Problem List   Diagnosis   • MELODY (acute kidney injury)     Past Medical History:   Diagnosis Date   • CHF (congestive heart failure)    • Diabetes mellitus    • GERD (gastroesophageal reflux disease)    • Hypercholesteremia    • Hypertension    • S/P CABG (coronary artery bypass graft)      Past Surgical History:   Procedure Laterality Date   • CORONARY ARTERY BYPASS GRAFT     • RENAL ARTERY STENT            OT ASSESSMENT FLOWSHEET (last 72 hours)      OT Evaluation       18 1040 18 1039 18 0945 18 0000 18 2300    Rehab Evaluation    Document Type evaluation   see MAR  -MR evaluation  -CB       Subjective Information agree to therapy;complains of;weakness;fatigue;pain  -MR agree to therapy;complains of;pain;dyspnea  -CB       Evaluation Not Performed   patient unavailable for evaluation   Pt out of room for ECHO. Will attempt to check back later.   -MR      Patient Effort, Rehab Treatment adequate  -MR adequate  -CB       Symptoms Noted During/After Treatment increased pain;shortness of breath;significant change in vital signs  -MR shortness of breath;increased pain  -CB       Symptoms Noted Comment complaining of pain in chest from pressure from ECHO  -MR complaining of pain in chest from pressure exerted for ECHO  -CB       General Information    Patient Profile Review yes  -MR yes  -CB       Onset of Illness/Injury or Date of Surgery Date 18  -MR 18  -CB       Referring Physician Ronda CABRALES  -MR Mario CABRALES   -CB       General Observations supine, + IV, + tele, with RN completing bed mobility to change sheets  -MR supine with telemetry, O2  present  -CB       Pertinent History Of Current Problem leg pain, dyspnea, weakness  -MR leg pain, dyspnea, weakness, fatigue  -CB       Precautions/Limitations fall precautions;oxygen therapy device and L/min  -MR fall precautions;oxygen therapy device and L/min  -CB       Prior Level of Function min assist:;all household mobility;transfer;ADL's;mod assist:;home management;cooking;cleaning  -MR min assist:;ADL's;gait  -CB       Equipment Currently Used at Home walker, rolling;power chair, (recliner lift);rollator   tripod cane; BSC  -MR cane, straight;commode;power chair, (recliner lift)   has rollator that he usese to push her around in  at times  -CB   walker, rolling  -CBA    Plans/Goals Discussed With patient and family;agreed upon  -MR patient and family  -CB       Risks Reviewed patient:;spouse/S.O.:;LOB;nausea/vomiting;dizziness;increased discomfort;change in vital signs;lines disloged  -MR patient and family:;increased discomfort;change in vital signs  -CB       Benefits Reviewed patient:;spouse/S.O.:;improve function;increase independence;increase strength;increase balance;decrease pain;decrease risk of DVT;improve skin integrity;increase knowledge  -MR patient and family:;improve function;decrease risk of DVT;improve skin integrity  -CB       Living Environment    Lives With spouse  -MR spouse  -CB  spouse  -CBA     Living Arrangements house  -MR house  -CB  house  -CBA     Home Accessibility bed and bath on same level;ramps present at home  -MR ramps present at home;stairs to enter home  -CB  no concerns  -CBA     Stair Railings at Home    none  -CBA     Type of Financial/Environmental Concern    none  -CBA     Transportation Available family or friend will provide  -MR family or friend will provide  -CB  car  -CBA     Living Environment Comment Pt completes sponge  bath sitting on toilet. Pt's spouse completes cooking and cleaning and he assist with ADL's  -MR abilities change from day to day, he usually walks with her when she is up , may need assistance some days not others  -CB       Clinical Impression    Date of Referral to OT 03/08/18  -MR        OT Diagnosis impaired mobility and ADL's  -MR        Prognosis fair  -MR        Functional Level At Time Of Evaluation impaired mobility and ADL's  -MR        Impairments Found (describe specific impairments) aerobic capacity/endurance;arousal, attention, and cognition;ergonomics and body mechanics;gait, locomotion, and balance;integumentary integrity;joint integrity and mobility;motor function;muscle performance;posture;ROM  -MR        Patient/Family Goals Statement Return home, pain to get better  -MR        Criteria for Skilled Therapeutic Interventions Met yes;treatment indicated  -MR        Rehab Potential fair, will monitor progress closely  -MR        Therapy Frequency --   3-14 x/wk  -MR        Predicted Duration of Therapy Intervention (days/wks) until d/c or all goals met  -MR        Anticipated Discharge Disposition home with home health;home with /7 care;skilled nursing facility  -MR        Functional Level Prior    Ambulation 3-->assistive equipment and person  -MR   3-->assistive equipment and person  -CBA     Transferring 3-->assistive equipment and person  -MR   3-->assistive equipment and person  -CBA     Toileting 3-->assistive equipment and person  -MR   3-->assistive equipment and person  -CBA     Bathing 2-->assistive person  -MR   3-->assistive equipment and person  -CBA     Dressing 2-->assistive person  -MR   3-->assistive equipment and person  -CBA     Eating 0-->independent  -MR   0-->independent  -CBA     Communication 0-->understands/communicates without difficulty  -MR   0-->understands/communicates without difficulty  -CBA     Swallowing 0-->swallows foods/liquids without difficulty  -MR    0-->swallows foods/liquids without difficulty  -CBA     Prior Functional Level Comment    n/a  -CBA     Vital Signs    Pre Systolic BP Rehab 140  -  -CB       Pre Treatment Diastolic BP 56  -MR 56  -CB       Post Systolic BP Rehab 164  -  -CB       Post Treatment Diastolic BP 60  -MR 40  -CB       Pretreatment Heart Rate (beats/min) 85  -MR 85  -CB       Intratreatment Heart Rate (beats/min) 82  -MR 82  -CB       Posttreatment Heart Rate (beats/min) 111  -  -CB       Pre SpO2 (%) 84   decreased to 81%; RN aware supplied NC 3L O2  -MR 84  -CB       O2 Delivery Pre Treatment room air  -MR room air  -CB       Intra SpO2 (%) 98  -MR 98  -CB       O2 Delivery Intra Treatment supplemental O2   3L O2  -MR supplemental O2  -CB       Post SpO2 (%) 93  -MR 93  -CB       O2 Delivery Post Treatment supplemental O2   3L O2  -MR supplemental O2  -CB       Pre Patient Position Supine  -MR Supine  -CB       Intra Patient Position Sitting  -MR Sitting  -CB       Post Patient Position Supine  -MR Supine  -CB       Pain Assessment    Pain Assessment 0-10  -MR 0-10  -CB       Pain Score 9   difficult to get breath; notified RN  -MR 9  -CB       Post Pain Score 10  -MR        Pain Type Acute pain;Chronic pain  -MR        Pain Location Chest   back ans BLE  -MR Back  -CB       Pain Frequency Constant/continuous  -MR        Pain Intervention(s) Medication (See MAR);Repositioned;Ambulation/increased activity;Distraction  -MR        Multiple Pain Sites  Yes  -CB       Pain 2    Pain Score 2  8  -CB       Pain Location 2  Chest  -CB       Pain Descriptors 2  Pressure   applied when getting ECHO  -CB       Vision Assessment/Intervention    Visual Impairment WFL with corrective lenses  -MR WFL with corrective lenses  -CB       Cognitive Assessment/Intervention    Current Cognitive/Communication Assessment functional  -MR functional  -CB       Orientation Status oriented to;person;place;situation   oriented to month, unsure of  year  -MR oriented to;person;place   , not month  -CB       Follows Commands/Answers Questions 75% of the time;able to follow single-step instructions;needs cueing;needs increased time;needs repetition  -MR 75% of the time  -CB       Personal Safety mild impairment;decreased awareness, need for assist;decreased awareness, need for safety  -MR mild impairment;decreased awareness, need for assist  -CB       Personal Safety Interventions fall prevention program maintained;gait belt;muscle strengthening facilitated;nonskid shoes/slippers when out of bed;supervised activity  -MR fall prevention program maintained;gait belt;nonskid shoes/slippers when out of bed  -CB       ROM (Range of Motion)    General ROM upper extremity range of motion deficits identified  -MR other (see comments)   none for BLE  -CB       General ROM Detail B shoulder flexion limited to ~ 80*, elbow, wrist, hand WFL  -MR        MMT (Manual Muscle Testing)    General MMT Assessment upper extremity strength deficits identified  -MR lower extremity strength deficits identified  -CB       General MMT Assessment Detail BUE shoulder grossly 2-/5, distal BUE grossly 3/5, poor  strength  -MR grossly LLE 2+/5 hip and knee and 2/5 ankle, RLE 3-/5  -CB       Upper Extremity    Upper Ext Manual Muscle Testing left shoulder strength deficit;right shoulder strength deficit;left wrist strength deficit;left elbow/forearm strength deficit;right elbow/forearm strength deficit;right wrist strength deficit  -MR        Mobility Assessment/Training    Extremity Weight-Bearing Status  left lower extremity;right lower extremity  -CB       Left Lower Extremity Weight-Bearing  weight-bearing as tolerated  -CB       Right Lower Extremity Weight-Bearing  weight-bearing as tolerated  -CB       Bed Mobility, Assessment/Treatment    Bed Mobility, Assistive Device bed rails;head of bed elevated  -MR bed rails;head of bed elevated  -CB       Bed Mobility, Roll Left,  [place] verbal cues required;minimum assist (75% patient effort);moderate assist (50% patient effort)  -MR        Bed Mobility, Roll Right, [place] verbal cues required;minimum assist (75% patient effort);moderate assist (50% patient effort)  -MR moderate assist (50% patient effort);maximum assist (25% patient effort)  -CB       Bed Mobility, Scoot/Bridge, [place] dependent (less than 25% patient effort);2 person assist required  -MR moderate assist (50% patient effort)  -CB       Bed Mob, Supine to Sit, [place] moderate assist (50% patient effort);verbal cues required  -MR moderate assist (50% patient effort)  -CB       Bed Mob, Sit to Supine, [place] minimum assist (75% patient effort);verbal cues required  -MR minimum assist (75% patient effort)  -CB       Bed Mobility, Safety Issues decreased use of arms for pushing/pulling;decreased use of legs for bridging/pushing;impaired trunk control for bed mobility  -MR        Bed Mobility, Impairments decreased flexibility;ROM decreased;strength decreased;impaired balance;coordination impaired;motor control impaired;postural control impaired;pain  -MR        Transfer Assessment/Treatment    Transfer, Comment Pt deferred attempt d/t increased pain in back  -MR unable to tolerate due to increased pain in back  -CB       Lower Body Dressing Assessment/Training    LB Dressing Assess/Train, Clothing Type donning:;socks  -MR        LB Dressing Assess/Train, Position supine  -MR        LB Dressing Assess/Train, [place] dependent (less than 25% patient effort)  -MR        LB Dressing Assess/Train, Impairments ROM decreased;strength decreased;impaired balance;coordination impaired;motor control impaired;postural control impaired;pain  -MR        Toileting Assessment/Training    Toileting Assess/Train, Assistive Device other (see comments)   bed pan  -MR        Toileting Assess/Train, Position supine  -MR        Toileting Assess/Train, Indepen Level  minimum assist (75% patient effort);maximum assist (25% patient effort)  -MR        Toileting Assess/Train, Impairments decreased flexibility;ROM decreased;strength decreased;impaired balance;coordination impaired;motor control impaired;postural control impaired;pain  -MR        Toileting Assess/Train, Comment Pt required min A for rolling left/right for placing bed pan, max A for placement and hygiene  -MR        Motor Skills/Interventions    Additional Documentation Balance Skills Training (Group);Fine Motor Coordination Training (Group)  -MR        Balance Skills Training    Sitting-Level of Assistance Minimum assistance  -MR        Fine Motor Coordination Training    Opposition Right:;Left:;intact  -MR        Sensory Assessment/Intervention    Light Touch LUE;RUE  -MR LLE;RLE  -CB       LUE Light Touch mild impairment   per pt numbness and tingling  -MR        RUE Light Touch WNL  -MR        LLE Light Touch  WNL  -CB       RLE Light Touch  WNL  -CB       General Therapy Interventions    Planned Therapy Interventions activity intolerance;adaptive equipment training;ADL retraining;IADL retraining;balance training;bed mobility training;energy conservation;fine motor coordination training;home exercise program;ROM (Range of Motion);strengthening;stretching;transfer training;edema management  -MR        Positioning and Restraints    Pre-Treatment Position in bed  -MR in bed  -CB       Post Treatment Position bed  -MR bed  -CB       In Bed supine;call light within reach;encouraged to call for assist;exit alarm on;with family/caregiver;with nsg;side rails up x2;heels elevated  -MR fowlers;call light within reach;encouraged to call for assist;with nsg;side rails up x2  -CB         User Key  (r) = Recorded By, (t) = Taken By, (c) = Cosigned By    Initials Name Effective Dates    CB Melanie Larose, PT 04/06/17 -     CBA Jaja Jha, RN 01/30/17 -     MR Ashley Mccoy, OT 03/07/18 -            Occupational  Therapy Education     Title: PT OT SLP Therapies (Active)     Topic: Occupational Therapy (Active)     Point: Precautions (Done)    Description: Instruct learner(s) on prescribed precautions during self-care and functional transfers.    Learning Progress Summary    Learner Readiness Method Response Comment Documented by Status   Patient Acceptance E VU,NR Pt educated on role of OT and POC. Pt educated on benefit of activity, safety with bed mobility, and to use call light if she needs assistance and not to get up on her own. MR 03/09/18 1154 Done   Significant Other Acceptance E VU,NR Pt educated on role of OT and POC. Pt educated on benefit of activity, safety with bed mobility, and to use call light if she needs assistance and not to get up on her own. MR 03/09/18 1154 Done               Point: Body mechanics (Done)    Description: Instruct learner(s) on proper positioning and spine alignment during self-care, functional mobility activities and/or exercises.    Learning Progress Summary    Learner Readiness Method Response Comment Documented by Status   Patient Acceptance E VU,NR Pt educated on role of OT and POC. Pt educated on benefit of activity, safety with bed mobility, and to use call light if she needs assistance and not to get up on her own. MR 03/09/18 1154 Done   Significant Other Acceptance E VU,NR Pt educated on role of OT and POC. Pt educated on benefit of activity, safety with bed mobility, and to use call light if she needs assistance and not to get up on her own. MR 03/09/18 1154 Done                      User Key     Initials Effective Dates Name Provider Type Discipline    MR 03/07/18 -  Ashley Mccoy OT Occupational Therapist OT                  OT Recommendation and Plan  Anticipated Discharge Disposition: home with home health, home with 24/7 care, skilled nursing facility  Planned Therapy Interventions: activity intolerance, adaptive equipment training, ADL retraining, IADL retraining,  balance training, bed mobility training, energy conservation, fine motor coordination training, home exercise program, ROM (Range of Motion), strengthening, stretching, transfer training, edema management  Therapy Frequency:  (3-14 x/wk)  Plan of Care Review  Plan Of Care Reviewed With: patient  Outcome Summary/Follow up Plan: OT evaluation completed this date. Pt had just returned from ECHO and was complaining of chest pain, O2 stats without  O2 on dropped into the low 80's. Pt tolerated rolling left/right with min A/mod A and VC, supine <> sit with min/mod A, pt required min A for sitting EOB d/t impaired balance and reported chronic back pain. Pt could benefit from skilled OT services to address decreased strength, balance, transfers, functional mobility, activity tolerance, and independence with ADL's. Recommend pt d/c home with 24/7 assist and HH vs SNF for cont rehab prior to d/c home with spouse; pending pt progress.           OT Goals       03/09/18 1417          Bed Mobility OT LTG    Bed Mobility OT LTG, Date Established 03/09/18  -MR      Bed Mobility OT LTG, Time to Achieve by discharge  -MR      Bed Mobility OT LTG, Activity Type all bed mobility  -MR      Bed Mobility OT LTG, [place] Level supervision required  -MR      Transfer Training OT LTG    Transfer Training OT LTG, Date Established 03/09/18  -MR      Transfer Training OT LTG, Time to Achieve by discharge  -MR      Transfer Training OT LTG, Activity Type all transfers   BSC  -MR      Transfer Training OT LTG, [place] Level contact guard assist  -MR      Transfer Training OT LTG, Assist Device walker, rolling  -MR      Dynamic Sitting Balance OT LTG    Dynamic Sitting Balance OT LTG, Date Established 03/09/18  -MR      Dynamic Sitting Balance OT LTG, Time to Achieve by discharge  -MR      Dynamic Sitting Balance OT LTG, [place] Level conditional independence  -MR      Dynamic Sitting Balance OT LTG, Assist Device UE Support  -MR       Dynamic Sitting Balance OT LTG, Additional Goal Pt will tolerate 10 minutes sitting EOB to complete ADL's  -MR      ADL OT LTG    ADL OT LTG, Date Established 03/09/18  -MR      ADL OT LTG, Time to Achieve by discharge  -MR      ADL OT LTG, [place] Level assistive device  -MR      ADL OT LTG, Additional Goal UB ADL min A; LB ADL mod A; grooming set-up/cond I  -MR        User Key  (r) = Recorded By, (t) = Taken By, (c) = Cosigned By    Initials Name Provider Type    MR Perezjigar Santizomond, OT Occupational Therapist                Outcome Measures       03/09/18 1040 03/09/18 1039       How much help from another person do you currently need...    Turning from your back to your side while in flat bed without using bedrails?  2  -CB     Moving from lying on back to sitting on the side of a flat bed without bedrails?  2  -CB     Moving to and from a bed to a chair (including a wheelchair)?  1  -CB     Standing up from a chair using your arms (e.g., wheelchair, bedside chair)?  1  -CB     Climbing 3-5 steps with a railing?  1  -CB     To walk in hospital room?  1  -CB     AM-PAC 6 Clicks Score  8  -CB     How much help from another is currently needed...    Putting on and taking off regular lower body clothing? 1  -MR      Bathing (including washing, rinsing, and drying) 2  -MR      Toileting (which includes using toilet bed pan or urinal) 2  -MR      Putting on and taking off regular upper body clothing 2  -MR      Taking care of personal grooming (such as brushing teeth) 3  -MR      Eating meals 3  -MR      Score 13  -MR      Functional Assessment    Outcome Measure Options AM-PAC 6 Clicks Daily Activity (OT)  -MR AM-PAC 6 Clicks Basic Mobility (PT)  -CB       User Key  (r) = Recorded By, (t) = Taken By, (c) = Cosigned By    Initials Name Provider Type    CB Melanie Larose, PT Physical Therapist    MR Perezjigar FRAZIER Darius, OT Occupational Therapist          Time Calculation:   OT Start Time: 1040  OT Stop Time:  1129  OT Time Calculation (min): 49 min    Therapy Charges for Today     Code Description Service Date Service Provider Modifiers Qty    80372625300 HC OT SELFCARE CURRENT 3/9/2018 MARISOL [place], CL 1    92091688068 HC OT SELFCARE PROJECTED 3/9/2018 MARISOL Del Rio CK 1    [id number]  OT EVAL MOD COMPLEXITY 3 3/9/2018 Ashley Mccoy OT GO 1          OT G-codes  OT Professional Judgement Used?: Yes  OT Functional Scales Options: AM-PAC 6 Clicks Daily Activity (OT)  Score: 13  Functional Limitation: Self care  Self Care Current Status (): At least 60 percent but less than 80 percent impaired, limited or restricted  Self Care Goal Status (): At least 40 percent but less than 60 percent impaired, limited or restricted    Ashley Mccoy OT  3/9/2018

## 2018-03-09 NOTE — PROGRESS NOTES
"Anticoagulation by Pharmacy - Warfarin    Dorcas Bain is a 77 y.o.female  [Ht: 157.5 cm (62\"); Wt: 58.5 kg (129 lb)] on Warfarin alternating doses of 1 mg daily except 2.5 mg on Mon and Thu PO  for indication of a fib.    Goal INR: 2-3  Today's INR:   Lab Results   Component Value Date    INR 2.04 (H) 03/09/2018         Lab Results   Component Value Date    INR 2.04 (H) 03/09/2018    INR 2.05 (H) 03/08/2018    INR 2.19 (H) 03/07/2018    PROTIME 22.2 (H) 03/09/2018    PROTIME 22.3 (H) 03/08/2018    PROTIME 23.4 (H) 03/07/2018     Lab Results   Component Value Date    HGB 11.0 (L) 03/08/2018    HGB 10.8 (L) 03/07/2018    HGB 11.5 (L) 03/06/2018     Lab Results   Component Value Date    HCT 33.0 (L) 03/08/2018    HCT 32.3 (L) 03/07/2018    HCT 34.2 (L) 03/06/2018     Lab Results   Component Value Date     03/08/2018     03/07/2018     03/06/2018     Assessment/Plan:  INR remains therapeutic, but is hovering on low end of goal  No new H&H to assess current trends, no s/sx of bleeding documented  No significant interactions noted  Received 2.5 mg yesterday  Will continue with home dose today - 1mg, but patient may require dose increase in near future  Pardeep Guevara, CHAN  03/09/18 2:07 PM     "

## 2018-03-09 NOTE — CONSULTS
Adult Nutrition  Assessment    Patient Name:  Dorcas Bain  YOB: 1940  MRN: [id number]  Admit Date:  3/6/2018    Assessment Date:  3/9/2018    Comments: Pt with slowly improving appetite although limited po documentation available.  Kidney function improving although bun & creat remains elevated.  Pressure ulcer is noted--stage 1.  RD will continue to monitor po.  She declines supplements at this time. Education reviewed on Low sodium/ADA diet.           Reason for Assessment       03/09/18 1519    Reason for Assessment    Reason For Assessment/Visit follow up protocol                Nutrition/Diet History       03/09/18 1521    Nutrition/Diet History    Typical Food/Fluid Intake Pt claims that her appetite is better than it was.  She is actually feeling better.  No Gi distress.  Her and her  voiced understanding of low sodium diet              Labs/Tests/Procedures/Meds       03/09/18 1521    Labs/Tests/Procedures/Meds    Labs/Tests Review Reviewed;BUN;Creat;Glucose    Medication Review Reviewed, pertinent            Physical Findings       03/09/18 1522    Physical Findings/Assessment    Additional Documentation Physical Appearance (Group)    Physical Appearance    Overall Physical Appearance on oxygen therapy    Skin pressure ulcer(s)              Nutrition Prescription Ordered       03/09/18 1522    Nutrition Prescription PO    Current PO Diet Regular    Fluid Consistency Thin    Common Modifiers Consistent Carbohydrate            Evaluation of Received Nutrient/Fluid Intake       03/09/18 1522    PO Evaluation    Number of Days PO Intake Evaluated Insufficient Data    Number of Meals 2    % PO Intake 75/25            Electronically signed by:  Shea Hargrove RD  03/09/18 3:26 PM

## 2018-03-09 NOTE — PLAN OF CARE
Problem: Patient Care Overview (Adult)  Goal: Plan of Care Review  Outcome: Ongoing (interventions implemented as appropriate)   03/09/18 1622   Coping/Psychosocial Response Interventions   Plan Of Care Reviewed With patient   Patient Care Overview   Progress no change   Outcome Evaluation   Outcome Summary/Follow up Plan pt vss. Pt pain was well controlled this shift.     Goal: Adult Individualization and Mutuality  Outcome: Ongoing (interventions implemented as appropriate)    Goal: Discharge Needs Assessment  Outcome: Ongoing (interventions implemented as appropriate)      Problem: Renal Failure/Kidney Injury, Acute (Adult)  Goal: Signs and Symptoms of Listed Potential Problems Will be Absent or Manageable (Renal Failure/Kidney Injury, Acute)  Outcome: Ongoing (interventions implemented as appropriate)      Problem: Fall Risk (Adult)  Goal: Absence of Falls  Outcome: Ongoing (interventions implemented as appropriate)      Problem: Pressure Ulcer Risk (Foreign Scale) (Adult,Obstetrics,Pediatric)  Goal: Skin Integrity  Outcome: Ongoing (interventions implemented as appropriate)      Problem: Pain, Chronic (Adult)  Goal: Acceptable Pain Control/Comfort Level  Outcome: Ongoing (interventions implemented as appropriate)      Problem: Pressure Ulcer (Adult)  Goal: Signs and Symptoms of Listed Potential Problems Will be Absent or Manageable (Pressure Ulcer)  Outcome: Ongoing (interventions implemented as appropriate)

## 2018-03-09 NOTE — PROGRESS NOTES
[facility]  INPATIENT PROGRESS NOTE    Length of Stay: 3  Date of Admission: 3/6/2018  Primary Care Physician: Markell Muller MD    Subjective   Please note that all previous progress notes, radiographical findings, lab findings, medication changes, and physical exam findings have been noted and updated as appropriate.    3/9/2018: Patient has no acute complaints.  Did have a fever overnight, urinalysis demonstrating gram-negative bacilli and Klebsiella, sensitivity so far shows ceftriaxone as an appropriate drug.  Patient is on ceftriaxone.  Denies shortness of air, nausea, vomiting.  Repeat blood cultures pending.  Creatinine near baseline per nephrology.  Consult appreciated.    Chief Complaint/HPI:  This 77 year old  female was emitted to Hospital services secondary to multiple medical comorbidities.  Patient has a history of stage IV chronic kidney disease, congestive heart failure, hypertension, diabetes mellitus type 2, anemia of chronic kidney disease.  Patient was recently started on Entresto by her cardiologist.  Patient began to experience increased fatigue and weakness, itching, medication was discontinued.  At that time patient complained of increased edema of the lower extremities and patient was treated with double doses of Lasix.  Patient was taking Lasix 80 mg by mouth twice a day, meaning that this was the dose was doubled.  At the time of admission creatinine had risen to 4.34.    Patient also demonstrates gram negative bacilli in the urine.  This is likely Escherichia coli.  Currently on ceftriaxone.  At this time will order echocardiogram to further evaluate valvular function.  We will order PT and OT secondary to deconditioning.  Creatinine improving, patient denies any complaints at this time.    Review of Systems   Constitutional: Negative for chills and fever.   Respiratory: Negative for shortness of breath.     Cardiovascular: Negative for chest pain.   Gastrointestinal: Negative for abdominal pain, nausea and vomiting.   Neurological: Negative for dizziness and light-headedness.      All pertinent negatives and positives are as above. All other systems have been reviewed and are negative unless otherwise stated.     Objective    Temp:  [98 °F (36.7 °C)-100.7 °F (38.2 °C)] 99.1 °F (37.3 °C)  Heart Rate:  [79-97] 82  Resp:  [18-20] 18  BP: (140-164)/(56-82) 140/56    Physical Exam   Constitutional: She appears well-developed and well-nourished.   HENT:   Head: Normocephalic and atraumatic.   Cardiovascular: Normal rate and regular rhythm.    Pulmonary/Chest: Effort normal. No respiratory distress.   Abdominal: Soft. Bowel sounds are normal. She exhibits no distension. There is no tenderness.   Neurological: She is alert.   Skin: Skin is warm and dry.     Results Review:  I have reviewed the labs, radiology results, and diagnostic studies.    Laboratory Data:     Results from last 7 days  Lab Units 03/09/18  0809 03/09/18  0452 03/08/18  0734 03/07/18  0149 03/06/18  2332   SODIUM mmol/L 141  --  141 139 141   POTASSIUM mmol/L 4.2 4.2 2.9* 3.1* 3.3*   CHLORIDE mmol/L 104  --  100 96 95   CO2 mmol/L 23.0  --  24.0 26.0 24.0   BUN mg/dL 71*  --  77* 99* 96*   CREATININE mg/dL 2.57*  --  3.15* 4.17* 4.34*   GLUCOSE mg/dL 206*  --  189* 206* 212*   CALCIUM mg/dL 10.3*  --  9.6 9.7 9.6   BILIRUBIN mg/dL  --   --   --   --  0.6   ALK PHOS U/L  --   --   --   --  83   ALT (SGPT) U/L  --   --   --   --  29   AST (SGOT) U/L  --   --   --   --  16   ANION GAP mmol/L 14.0  --  17.0* 17.0* 22.0*     Estimated Creatinine Clearance: 17.2 mL/min (by C-G formula based on Cr of 2.57).            Results from last 7 days  Lab Units 03/08/18  0734 03/07/18  0149 03/06/18  2332   WBC 10*3/mm3 12.99* 10.46* 11.17*   HEMOGLOBIN g/dL 11.0* 10.8* 11.5*   HEMATOCRIT % 33.0* 32.3* 34.2*   PLATELETS 10*3/mm3 320 290 323       Results from last 7  days  Lab Units 03/09/18  0809 03/08/18  0734 03/07/18  0444   INR  2.04* 2.05* 2.19*       Culture Data:   No results found for: BLOODCX  Urine Culture   Date Value Ref Range Status   03/06/2018 >100,000 CFU/mL Klebsiella pneumoniae (A)  Preliminary   03/06/2018 >100,000 CFU/mL Gram Negative Bacilli (A)  Preliminary     No results found for: RESPCX  No results found for: WOUNDCX  No results found for: STOOLCX  No components found for: BODYFLD    Radiology Data:   Imaging Results (last 24 hours)     ** No results found for the last 24 hours. **          I have reviewed the patient current medications.     Assessment/Plan     Hospital Problem List     MELODY (acute kidney injury)          Plan:  Chest xray, follow WBC, echocardiogram, continue as hospital course dictates.                This document has been electronically signed by SAMRA Mulligan on March 9, 2018 11:50 AM

## 2018-03-09 NOTE — PLAN OF CARE
Problem: Patient Care Overview (Adult)  Goal: Plan of Care Review  Outcome: Ongoing (interventions implemented as appropriate)    Goal: Discharge Needs Assessment  Outcome: Ongoing (interventions implemented as appropriate)   03/09/18 1039   Discharge Needs Assessment   Concerns To Be Addressed coping/stress concerns   Equipment Needed After Discharge none   Current Discharge Risk chronically ill;physical impairment   Discharge Disposition still a patient   Current Health   Outpatient/Agency/Support Group Needs homecare agency (specify level of care)   Living Environment   Transportation Available family or friend will provide   Self-Care   Equipment Currently Used at Home cane, straight;commode;power chair, (recliner lift)  (has rollator that he usese to push her around in at times)       Problem: Inpatient Physical Therapy  Goal: Bed Mobility Goal STG- PT  Outcome: Ongoing (interventions implemented as appropriate)   03/09/18 1039   Bed Mobility PT STG   Bed Mobility PT STG, Date Established 03/09/18   Bed Mobility PT STG, Time to Achieve 2 - 3 days   Bed Mobility PT STG, Activity Type supine to sit/sit to supine   Bed Mobility PT STG, [place] Level contact guard assist   Bed Mobility PT STG, Additional Goal HOB down and no rails, head propped with multiple pillows     Goal: Transfer Training Goal 1 STG- PT  Outcome: Ongoing (interventions implemented as appropriate)   03/09/18 1039   Transfer Training PT STG   Transfer Training PT STG, Date Established 03/09/18   Transfer Training PT STG, Time to Achieve 2 - 3 days   Transfer Training PT STG, Activity Type bed to chair /chair to bed;sit to stand/stand to sit   Transfer Training PT STG, [place] Level minimum assist (75% patient effort)   Transfer Training PT STG, Assist Device walker, rolling;cane, straight     Goal: Gait Training Goal LTG- PT  Outcome: Ongoing (interventions implemented as appropriate)   03/09/18 1039   Gait Training PT LTG   Gait  Training Goal PT LTG, Date Established 03/09/18   Gait Training Goal PT LTG, [place] Level contact guard assist   Gait Training Goal PT LTG, Assist Device walker, rolling;cane, straight   Gait Training Goal PT LTG, Distance to Achieve 100 feet     Goal: Strength Goal STG- PT  Outcome: Ongoing (interventions implemented as appropriate)   03/09/18 1039   Strength Goal PT STG   Strength Goal PT STG, Date Established 03/09/18   Strength Goal PT STG, Time to Achieve 2 - 3 days   Strength Goal PT STG, Measure to Achieve 20 reps all ex BLE actively   Strength Goal PT STG, Functional Goal be able to get BLE up onto bed independently

## 2018-03-09 NOTE — SIGNIFICANT NOTE
03/09/18 0945   Rehab Treatment   Discipline occupational therapist   Rehab Evaluation   Evaluation Not Performed patient unavailable for evaluation  (Pt out of room for ECHO. Will attempt to check back later. )

## 2018-03-09 NOTE — PROGRESS NOTES
Nephrology Progress Note:    Had low-grade temperature last night.  He wants to go home.  Good urine output.  Seems to be tachypneic.  IV fluids at 40 ML per hour.   is at bedside.  Denies diarrhea or abdominal pain.  She is much more alert and oriented today.    Patient Active Problem List   Diagnosis   • MELODY (acute kidney injury)       Medications:    aspirin 81 mg Oral Daily   atorvastatin 10 mg Oral Daily   calcitriol 0.25 mcg Oral Daily   carvedilol 12.5 mg Oral BID With Meals   ceftriaxone 1 g Intravenous Q24H   CloNIDine 0.2 mg Oral Daily   CloNIDine 0.4 mg Oral Nightly   clorazepate 3.75 mg Oral Nightly   insulin aspart 0-14 Units Subcutaneous 4x Daily AC & at Bedtime   levothyroxine 75 mcg Oral Daily   nystatin  Topical Q12H   pantoprazole 40 mg Oral QAM   warfarin 1 mg Oral Once per day on Sun Tue Wed Fri Sat   warfarin 2.5 mg Oral Once per day on Mon Thu       Pharmacy to dose warfarin      Vitals:    03/09/18 0328 03/09/18 0520 03/09/18 0728 03/09/18 0826   BP: 146/68   154/64   BP Location: Right arm   Right arm   Patient Position: Lying   Lying   Pulse: 85  80 83   Resp: 18   18   Temp: 100.4 °F (38 °C) 98.5 °F (36.9 °C)  98.6 °F (37 °C)   TempSrc: Oral Oral  Axillary   SpO2: 91%   94%   Weight:       Height:         I/O last 3 completed shifts:  In: 460 [P.O.:360; IV Piggyback:100]  Out: 3500 [Urine:3500]       On examination:  General:Alert and oriented, tachypneic, wanting to go home  HEENT: Pallor present, no icterus, eye movements are normal  Neck: No JVP or thyroid enlargement  Chest: Coarse breath sounds at the lung bases.  Air entry appears equal bilaterally   CVS: Heart sounds are irregular.  No pericardial rub or gallop  Abdomen: Distended, soft, normal bowel sounds, no organomegaly.  No rebound or guarding  Extremities: Trace edema of the lower ex midis, much improved  Neuro: Grade 4 power upper and lower extremities.  No asterixis  Mentation: Alert and oriented    Past medical  illness, social history, medications, previous notes reviewed.       Laboratory results:      Recent Results (from the past 24 hour(s))   POC Glucose Once    Collection Time: 03/08/18 11:13 AM   Result Value Ref Range    Glucose 247 (H) 70 - 130 mg/dL   POC Glucose Once    Collection Time: 03/08/18  4:43 PM   Result Value Ref Range    Glucose 218 (H) 70 - 130 mg/dL   POC Glucose Once    Collection Time: 03/08/18  8:51 PM   Result Value Ref Range    Glucose 233 (H) 70 - 130 mg/dL   Potassium    Collection Time: 03/09/18  4:52 AM   Result Value Ref Range    Potassium 4.2 3.5 - 5.1 mmol/L   POC Glucose Once    Collection Time: 03/09/18  7:40 AM   Result Value Ref Range    Glucose 211 (H) 70 - 130 mg/dL   Basic Metabolic Panel    Collection Time: 03/09/18  8:09 AM   Result Value Ref Range    Glucose 206 (H) 60 - 100 mg/dL    BUN 71 (H) 7 - 21 mg/dL    Creatinine 2.57 (H) 0.50 - 1.00 mg/dL    Sodium 141 137 - 145 mmol/L    Potassium 4.2 3.5 - 5.1 mmol/L    Chloride 104 95 - 110 mmol/L    CO2 23.0 22.0 - 31.0 mmol/L    Calcium 10.3 (H) 8.4 - 10.2 mg/dL    eGFR Non African Amer 18 (L) >60 mL/min/1.73    BUN/Creatinine Ratio 27.6 (H) 7.0 - 25.0    Anion Gap 14.0 5.0 - 15.0 mmol/L   Protime-INR    Collection Time: 03/09/18  8:09 AM   Result Value Ref Range    Protime 22.2 (H) 11.1 - 15.3 Seconds    INR 2.04 (H) 0.80 - 1.20   Adult Transthoracic Echo Complete W/ Cont if Necessary Per Protocol    Collection Time: 03/09/18 10:51 AM   Result Value Ref Range    BSA 1.6 m^2     CV ECHO YASMIN - RVDD 2.4 cm    IVSd 1.1 cm    LVIDd 4.6 cm    LVIDs 2.9 cm    LVPWd 1.1 cm    IVS/LVPW 1.0     FS 37.0 %    EDV(Teich) 97.3 ml    ESV(Teich) 32.2 ml    EF(Teich) 66.9 %    EDV(cubed) 97.3 ml    ESV(cubed) 24.4 ml    EF(cubed) 74.9 %    LV mass(C)d 181.2 grams    LV mass(C)dI 114.2 grams/m^2    SV(Teich) 65.1 ml    SI(Teich) 41.0 ml/m^2    SV(cubed) 72.9 ml    SI(cubed) 46.0 ml/m^2    Ao root diam 3.4 cm    Ao root area 9.1 cm^2    ACS 1.9  cm    LA dimension 3.8 cm    asc Aorta Diam 2.8 cm    Ao Isth Diam 2.4 cm    LA/Ao 1.1     LVOT diam 2.0 cm    LVOT area 3.1 cm^2    LVOT area(traced) 3.1 cm^2    Ao root area (BSA corrected) 2.1     MV E max mary ellen 90.3 cm/sec    MV A max mary ellen 129.0 cm/sec    MV E/A 0.7     MV V2 max 137.0 cm/sec    MV max PG 7.5 mmHg    MV V2 mean 74.9 cm/sec    MV mean PG 3.0 mmHg    MV V2 VTI 25.9 cm    MVA(VTI) 2.9 cm^2    MV P1/2t max mary ellen 104.0 cm/sec    MV P1/2t 42.2 msec    MVA(P1/2t) 5.2 cm^2    MV dec slope 721.0 cm/sec^2    Ao pk mary ellen 124.0 cm/sec    Ao max PG 6.2 mmHg    Ao max PG (full) 2.7 mmHg    Ao V2 mean 83.2 cm/sec    Ao mean PG 3.0 mmHg    Ao mean PG (full) 1.0 mmHg    Ao V2 VTI 24.8 cm    SABRA(I,A) 3.0 cm^2    SABRA(I,D) 3.0 cm^2    SABRA(V,A) 2.4 cm^2    SABRA(V,D) 2.4 cm^2    LV V1 max PG 3.4 mmHg    LV V1 mean PG 2.0 mmHg    LV V1 max 92.8 cm/sec    LV V1 mean 62.0 cm/sec    LV V1 VTI 23.8 cm    SV(Ao) 225.2 ml    SI(Ao) 141.9 ml/m^2    SV(LVOT) 74.8 ml    SI(LVOT) 47.1 ml/m^2    PA V2 max 96.4 cm/sec    PA max PG 3.7 mmHg    PI end-d mary ellen 158.0 cm/sec    TR max mary ellen 340.0 cm/sec    RVSP(TR) 51.3 mmHg    RAP systole 5.0 mmHg    MVA P1/2T LCG 2.1 cm^2     CV ECHO YASMIN - BZI_BMI 23.6 kilograms/m^2     CV ECHO YASMIN - BSA([place]OCK) 1.6 m^2     CV ECHO YASMIN - BZI_METRIC_WEIGHT 58.5 kg    BH CV ECHO YASMIN - BZI_METRIC_HEIGHT 157.5 cm    Target HR (85%) 122 bpm    Max. Pred. HR (100%) 144 bpm   ]    Imaging Results (last 24 hours)     ** No results found for the last 24 hours. **            Assessment:     Acute renal failure  Stage IV chronic kidney disease, baseline serum creatinine around 2.5  Congestive heart failure, stable volume status  History of arthralgia, gout  Urinary tract infection  Anemia  Type 2 diabetes mellitus  Secondary hyperparathyroidism  History of GI bleeding in the pastfever, being evaluated       Plan:     Acute renal failure, stage IV chronic kidney disease.  Baseline serum creatinine of 2.4-2.8.   Renal functions are improving.  Admission lab investigations with a serum creatinine close to 4.  Entresto and diuretics are on hold.      History of congestive heart failure and edema.  Appears more tachypneic.  No significant edema of the lower ex midis.  Agree with getting chest x-ray.  Lasix 40 mg IV dose now.  Discontinue  IV fluids.     Urinary tract infection: On Rocephin.  Urine cultures with Klebsiella, sensitive to ceftriaxone     Type 2 diabetes: Hemoglobin A1c was 8.8.  Patient is on insulin.  Not on metformin.  As proteinuria.    History of arthralgia and gout: No NSAIDs.    Hypokalemia: Serum potassium was 2.9.  received replacement.  Potassium levels are stable.  Follow-up repeat potassium with diuretics.  Patient was on potassium supplements at home     Discussed with patient and .  I advised patient not to go home, and to stay till she is more stable and her medications have been titrated.        Devon Downing MD

## 2018-03-09 NOTE — PLAN OF CARE
Problem: Patient Care Overview (Adult)  Goal: Plan of Care Review  Outcome: Ongoing (interventions implemented as appropriate)   03/09/18 1417   Coping/Psychosocial Response Interventions   Plan Of Care Reviewed With patient   Outcome Evaluation   Outcome Summary/Follow up Plan OT evaluation completed this date. Pt had just returned from ECHO and was complaining of chest pain, O2 stats without O2 on dropped into the low 80's. Pt tolerated rolling left/right with min A/mod A and VC, supine <> sit with min/mod A, pt required min A for sitting EOB d/t impaired balance and reported chronic back pain. Pt could benefit from skilled OT services to address decreased strength, balance, transfers, functional mobility, activity tolerance, and independence with ADL's. Recommend pt d/c home with 24/7 assist and HH vs SNF for cont rehab prior to d/c home with spouse; pending pt progress.        Problem: Inpatient Occupational Therapy  Goal: Bed Mobility Goal LTG- OT  Outcome: Ongoing (interventions implemented as appropriate)   03/09/18 1417   Bed Mobility OT LTG   Bed Mobility OT LTG, Date Established 03/09/18   Bed Mobility OT LTG, Time to Achieve by discharge   Bed Mobility OT LTG, Activity Type all bed mobility   Bed Mobility OT LTG, [place] Level supervision required     Goal: Transfer Training Goal 1 LTG- OT  Outcome: Ongoing (interventions implemented as appropriate)   03/09/18 1417   Transfer Training OT LTG   Transfer Training OT LTG, Date Established 03/09/18   Transfer Training OT LTG, Time to Achieve by discharge   Transfer Training OT LTG, Activity Type all transfers  (BSC)   Transfer Training OT LTG, [place] Level contact guard assist   Transfer Training OT LTG, Assist Device walker, rolling     Goal: Dynamic Sitting Balance Goal LTG- OT  Outcome: Ongoing (interventions implemented as appropriate)   03/09/18 1417   Dynamic Sitting Balance OT LTG   Dynamic Sitting Balance OT LTG, Date Established 03/09/18    Dynamic Sitting Balance OT LTG, Time to Achieve by discharge   Dynamic Sitting Balance OT LTG, [place] Level conditional independence   Dynamic Sitting Balance OT LTG, Assist Device UE Support   Dynamic Sitting Balance OT LTG, Additional Goal Pt will tolerate 10 minutes sitting EOB to complete ADL's     Goal: ADL Goal LTG- OT  Outcome: Ongoing (interventions implemented as appropriate)   03/09/18 1417   ADL OT LTG   ADL OT LTG, Date Established 03/09/18   ADL OT LTG, Time to Achieve by discharge   ADL OT LTG, [place] Level assistive device   ADL OT LTG, Additional Goal UB ADL min A; LB ADL mod A; grooming set-up/cond I

## 2018-03-09 NOTE — THERAPY EVALUATION
Acute Care - Physical Therapy Initial Evaluation  [facility]     Patient Name: Dorcas Bain  : 1940  MRN: [id number]  Today's Date: 3/9/2018   Onset of Illness/Injury or Date of Surgery Date: 18  Date of Referral to PT: 18  Referring Physician: Ronda CABRALES      Admit Date: 3/6/2018     Visit Dx:    ICD-10-CM ICD-9-CM   1. Impaired physical mobility Z74.09 781.99     Patient Active Problem List   Diagnosis   • MELODY (acute kidney injury)     Past Medical History:   Diagnosis Date   • CHF (congestive heart failure)    • Diabetes mellitus    • GERD (gastroesophageal reflux disease)    • Hypercholesteremia    • Hypertension    • S/P CABG (coronary artery bypass graft)      Past Surgical History:   Procedure Laterality Date   • CORONARY ARTERY BYPASS GRAFT     • RENAL ARTERY STENT            PT ASSESSMENT (last 72 hours)      PT Evaluation       18 1040 18 1039    Rehab Evaluation    Document Type evaluation   see MAR  -MR evaluation  -CB    Subjective Information agree to therapy;complains of;weakness;fatigue;pain  -MR agree to therapy;complains of;pain;dyspnea  -CB    Patient Effort, Rehab Treatment adequate  -MR adequate  -CB    Symptoms Noted During/After Treatment increased pain;shortness of breath;significant change in vital signs  -MR shortness of breath;increased pain  -CB    Symptoms Noted Comment complaining of pain in chest from pressure from ECHO  -MR complaining of pain in chest from pressure exerted for ECHO  -CB    General Information    Patient Profile Review yes  -MR yes  -CB    Onset of Illness/Injury or Date of Surgery Date 18  -MR 18  -CB    Referring Physician Ronda CABRALES  -MR Mario CABRALES  -LONNY    General Observations supine, + IV, + tele, with RN completing bed mobility to change sheets  -MR supine with telemetry, O2  present  -CB    Pertinent History Of Current Problem leg pain, dyspnea, weakness  -MR leg pain, dyspnea, weakness, fatigue  -CB     Precautions/Limitations fall precautions;oxygen therapy device and L/min  -MR fall precautions;oxygen therapy device and L/min  -CB    Prior Level of Function min assist:;all household mobility;transfer;ADL's;mod assist:;home management;cooking;cleaning  -MR min assist:;ADL's;gait  -CB    Equipment Currently Used at Home walker, rolling;power chair, (recliner lift);rollator   tripod cane; BSC  -MR cane, straight;commode;power chair, (recliner lift)   has rollator that he usese to push her around in  at times  -CB    Plans/Goals Discussed With patient and family;agreed upon  -MR patient and family  -CB    Risks Reviewed patient:;spouse/S.O.:;LOB;nausea/vomiting;dizziness;increased discomfort;change in vital signs;lines disloged  -MR patient and family:;increased discomfort;change in vital signs  -CB    Benefits Reviewed patient:;spouse/S.O.:;improve function;increase independence;increase strength;increase balance;decrease pain;decrease risk of DVT;improve skin integrity;increase knowledge  -MR patient and family:;improve function;decrease risk of DVT;improve skin integrity  -CB    Living Environment    Lives With spouse  -MR spouse  -CB    Living Arrangements house  -MR house  -CB    Home Accessibility bed and bath on same level;ramps present at home  -MR ramps present at home;stairs to enter home  -CB    Transportation Available family or friend will provide  -MR family or friend will provide  -CB    Living Environment Comment Pt completes sponge bath sitting on toilet. Pt's spouse completes cooking and cleaning and he assist with ADL's  -MR abilities change from day to day, he usually walks with her when she is up , may need assistance some days not others  -CB    Clinical Impression    Date of Referral to PT  03/08/18  -CB    PT Diagnosis  impaired physical mobility due to MELODY, UTI, weakness  -CB    Criteria for Skilled Therapeutic Interventions Met  treatment indicated;yes  -CB    Pathology/Pathophysiology  Noted (Describe Specifically for Each System)  musculoskeletal;pulmonary;genitourinary  -CB    Impairments Found (describe specific impairments)  aerobic capacity/endurance;gait, locomotion, and balance  -CB    Functional Limitations in Following Categories (Describe Specific Limitations)  self-care;home management  -CB    Rehab Potential  fair, will monitor progress closely  -CB    Vital Signs    Pre Systolic BP Rehab 140  -  -CB    Pre Treatment Diastolic BP 56  -MR 56  -CB    Post Systolic BP Rehab 164  -  -CB    Post Treatment Diastolic BP 60  -MR 40  -CB    Pretreatment Heart Rate (beats/min) 85  -MR 85  -CB    Intratreatment Heart Rate (beats/min) 82  -MR 82  -CB    Posttreatment Heart Rate (beats/min) 111  -  -CB    Pre SpO2 (%) 84   decreased to 81%; RN aware supplied NC 3L O2  -MR 84  -CB    O2 Delivery Pre Treatment room air  -MR room air  -CB    Intra SpO2 (%) 98  -MR 98  -CB    O2 Delivery Intra Treatment supplemental O2   3L O2  -MR supplemental O2  -CB    Post SpO2 (%) 93  -MR 93  -CB    O2 Delivery Post Treatment supplemental O2   3L O2  -MR supplemental O2  -CB    Pre Patient Position Supine  -MR Supine  -CB    Intra Patient Position Sitting  -MR Sitting  -CB    Post Patient Position Supine  -MR Supine  -CB    Pain Assessment    Pain Assessment 0-10  -MR 0-10  -CB    Pain Score 9   difficult to get breath; notified RN  -MR 9  -CB    Post Pain Score 10  -MR     Pain Type Acute pain;Chronic pain  -MR     Pain Location Chest   back ans BLE  -MR Back  -CB    Pain Frequency Constant/continuous  -MR     Pain Intervention(s) Medication (See MAR);Repositioned;Ambulation/increased activity;Distraction  -MR     Multiple Pain Sites  Yes  -CB    Pain 2    Pain Score 2  8  -CB    Pain Location 2  Chest  -CB    Pain Descriptors 2  Pressure   applied when getting ECHO  -CB    Vision Assessment/Intervention    Visual Impairment WFL with corrective lenses  -MR WFL with corrective lenses  -CB     Cognitive Assessment/Intervention    Current Cognitive/Communication Assessment functional  -MR functional  -CB    Orientation Status oriented to;person;place;situation   oriented to month, unsure of year  -MR oriented to;person;place   , not month  -CB    Follows Commands/Answers Questions 75% of the time;able to follow single-step instructions;needs cueing;needs increased time;needs repetition  -MR 75% of the time  -CB    Personal Safety mild impairment;decreased awareness, need for assist;decreased awareness, need for safety  -MR mild impairment;decreased awareness, need for assist  -CB    Personal Safety Interventions fall prevention program maintained;gait belt;muscle strengthening facilitated;nonskid shoes/slippers when out of bed;supervised activity  -MR fall prevention program maintained;gait belt;nonskid shoes/slippers when out of bed  -CB    ROM (Range of Motion)    General ROM upper extremity range of motion deficits identified  -MR other (see comments)   none for BLE  -CB    General ROM Detail B shoulder flexion limited to ~ 80*, elbow, wrist, hand WFL  -MR     MMT (Manual Muscle Testing)    General MMT Assessment upper extremity strength deficits identified  -MR lower extremity strength deficits identified  -CB    General MMT Assessment Detail BUE shoulder grossly 2-/5, distal BUE grossly 3/5, poor  strength  -MR grossly LLE 2+/5 hip and knee and 2/5 ankle, RLE 3-/5  -CB    Upper Extremity    Upper Ext Manual Muscle Testing left shoulder strength deficit;right shoulder strength deficit;left wrist strength deficit;left elbow/forearm strength deficit;right elbow/forearm strength deficit;right wrist strength deficit  -MR     Mobility Assessment/Training    Extremity Weight-Bearing Status  left lower extremity;right lower extremity  -CB    Left Lower Extremity Weight-Bearing  weight-bearing as tolerated  -CB    Right Lower Extremity Weight-Bearing  weight-bearing as tolerated  -CB    Bed Mobility,  Assessment/Treatment    Bed Mobility, Assistive Device bed rails;head of bed elevated  -MR bed rails;head of bed elevated  -CB    Bed Mobility, Roll Left, [place] verbal cues required;contact guard assist;minimum assist (75% patient effort)  -MR     Bed Mobility, Roll Right, [place] verbal cues required;contact guard assist;minimum assist (75% patient effort)  -MR moderate assist (50% patient effort);maximum assist (25% patient effort)  -CB    Bed Mobility, Scoot/Bridge, [place] dependent (less than 25% patient effort);2 person assist required  -MR moderate assist (50% patient effort)  -CB    Bed Mob, Supine to Sit, [place] moderate assist (50% patient effort);verbal cues required  -MR moderate assist (50% patient effort)  -CB    Bed Mob, Sit to Supine, [place] minimum assist (75% patient effort);verbal cues required  -MR minimum assist (75% patient effort)  -CB    Bed Mobility, Safety Issues decreased use of arms for pushing/pulling;decreased use of legs for bridging/pushing;impaired trunk control for bed mobility  -MR     Bed Mobility, Impairments decreased flexibility;ROM decreased;strength decreased;impaired balance;coordination impaired;motor control impaired;postural control impaired;pain  -MR     Transfer Assessment/Treatment    Transfer, Comment Pt deferred attempt d/t increased pain in back  -MR unable to tolerate due to increased pain in back  -CB    Motor Skills/Interventions    Additional Documentation Balance Skills Training (Group);Fine Motor Coordination Training (Group)  -MR     Balance Skills Training    Sitting-Level of Assistance Minimum assistance  -MR     Fine Motor Coordination Training    Opposition Right:;Left:;intact  -MR     Sensory Assessment/Intervention    Light Touch LUE;RUE  -MR LLE;RLE  -CB    LUE Light Touch mild impairment   per pt numbness and tingling  -MR     RUE Light Touch WNL  -MR     LLE Light Touch  WNL  -CB    RLE Light Touch  WNL  -CB     Positioning and Restraints    Pre-Treatment Position in bed  -MR in bed  -CB    Post Treatment Position bed  -MR bed  -CB    In Bed supine;call light within reach;encouraged to call for assist;exit alarm on;with family/caregiver;with nsg;side rails up x2;heels elevated  -MR fowlers;call light within reach;encouraged to call for assist;with nsg;side rails up x2  -CB      03/09/18 0945 03/07/18 0000    Rehab Evaluation    Evaluation Not Performed patient unavailable for evaluation   Pt out of room for ECHO. Will attempt to check back later.   -MR     Living Environment    Lives With  spouse  -CBA    Living Arrangements  house  -CBA    Home Accessibility  no concerns  -CBA    Stair Railings at Home  none  -CBA    Type of Financial/Environmental Concern  none  -CBA    Transportation Available  car  -CBA      03/06/18 2300       General Information    Equipment Currently Used at Home walker, rolling  -CBA       User Key  (r) = Recorded By, (t) = Taken By, (c) = Cosigned By    Initials Name Provider Type    CB Melanie Larose, PT Physical Therapist    CBA Jaja Jha, RN Registered Nurse     Ashley Mccoy, OT Occupational Therapist          Physical Therapy Education     Title: PT OT SLP Therapies (Active)     Topic: Physical Therapy (Active)     Point: Mobility training (Active)    Learning Progress Summary    Learner Readiness Method Response Comment Documented by Status   Patient Acceptance D NR  CB 03/09/18 1157 Active   Family Acceptance D NR  CB 03/09/18 1157 Active               Point: Precautions (Active)    Learning Progress Summary    Learner Readiness Method Response Comment Documented by Status   Patient Acceptance D NR  CB 03/09/18 1157 Active   Family Acceptance D NR  CB 03/09/18 1157 Active                      User Key     Initials Effective Dates Name Provider Type Discipline     04/06/17 -  Melanie Larose, PT Physical Therapist PT                PT Recommendation and Plan  Anticipated  Equipment Needs At Discharge:  (none)  Anticipated Discharge Disposition: home with home health, skilled nursing facility  Demonstrates Need for Referral to Another Service: home health care  Planned Therapy Interventions: bed mobility training, gait training, home exercise program, balance training, patient/family education, strengthening, transfer training  PT Frequency: other (see comments) (5-14)  Plan of Care Review  Plan Of Care Reviewed With: patient, spouse  Outcome Summary/Follow up Plan: PT hasmukh completed, had just returned from ECHO and was complaining of pain in chest from pressure applied during ECHO and taht it was taking wasy her breath, O2 stats without O2 on were in low 89, also complaing of back pain which is chronic, could only tolerate rolling side to side with mod assistance and then supine to sit with mod to max assist, unable to get independent sitting balance, could benefit from skilled PT  to regain and return to highest level of function in bed mobility transfers and gait          IP PT Goals       03/09/18 1039          Bed Mobility PT STG    Bed Mobility PT STG, Date Established 03/09/18  -CB      Bed Mobility PT STG, Time to Achieve 2 - 3 days  -CB      Bed Mobility PT STG, Activity Type supine to sit/sit to supine  -CB      Bed Mobility PT STG, [place] Level contact guard assist  -CB      Bed Mobility PT STG, Additional Goal HOB down and no rails, head propped with multiple pillows  -CB      Transfer Training PT STG    Transfer Training PT STG, Date Established 03/09/18  -CB      Transfer Training PT STG, Time to Achieve 2 - 3 days  -CB      Transfer Training PT STG, Activity Type bed to chair /chair to bed;sit to stand/stand to sit  -CB      Transfer Training PT STG, [place] Level minimum assist (75% patient effort)  -CB      Transfer Training PT STG, Assist Device walker, rolling;cane, straight  -CB      Gait Training PT LTG    Gait Training Goal PT LTG, Date Established  03/09/18  -CB      Gait Training Goal PT LTG, [place] Level contact guard assist  -CB      Gait Training Goal PT LTG, Assist Device walker, rolling;cane, straight  -CB      Gait Training Goal PT LTG, Distance to Achieve 100 feet  -CB      Strength Goal PT STG    Strength Goal PT STG, Date Established 03/09/18  -CB      Strength Goal PT STG, Time to Achieve 2 - 3 days  -CB      Strength Goal PT STG, Measure to Achieve 20 reps all ex BLE actively  -CB      Strength Goal PT STG, Functional Goal be able to get BLE up onto bed independently  -CB        User Key  (r) = Recorded By, (t) = Taken By, (c) = Cosigned By    Initials Name Provider Type    CB Melanie Larose, PT Physical Therapist                Outcome Measures       03/09/18 1040 03/09/18 1039       How much help from another person do you currently need...    Turning from your back to your side while in flat bed without using bedrails?  2  -CB     Moving from lying on back to sitting on the side of a flat bed without bedrails?  2  -CB     Moving to and from a bed to a chair (including a wheelchair)?  1  -CB     Standing up from a chair using your arms (e.g., wheelchair, bedside chair)?  1  -CB     Climbing 3-5 steps with a railing?  1  -CB     To walk in hospital room?  1  -CB     AM-PAC 6 Clicks Score  8  -CB     How much help from another is currently needed...    Putting on and taking off regular lower body clothing? 1  -MR      Bathing (including washing, rinsing, and drying) 2  -MR      Toileting (which includes using toilet bed pan or urinal) 2  -MR      Putting on and taking off regular upper body clothing 2  -MR      Taking care of personal grooming (such as brushing teeth) 3  -MR      Eating meals 3  -MR      Score 13  -MR      Functional Assessment    Outcome Measure Options AM-PAC 6 Clicks Daily Activity (OT)  -MR AM-PAC 6 Clicks Basic Mobility (PT)  -CB       User Key  (r) = Recorded By, (t) = Taken By, (c) = Cosigned By    Initials Name  Provider Type    CB Melanie Larose, PT Physical Therapist    MR Ashley Mccoy, OT Occupational Therapist           Time Calculation:         PT Charges       03/09/18 1316          Time Calculation    Start Time 1039  -CB      Stop Time 1129  -CB      Time Calculation (min) 50 min  -CB      PT Received On 03/09/18  -CB      PT Goal Re-Cert Due Date 03/22/18  -CB        User Key  (r) = Recorded By, (t) = Taken By, (c) = Cosigned By    Initials Name Provider Type    CB Melanie Larose, PT Physical Therapist          Therapy Charges for Today     Code Description Service Date Service Provider Modifiers Qty    66117224448 HC PT MOBILITY CURRENT 3/9/2018 Melanie Larose, PT GP, CM 1    53799928262 HC PT MOBILITY PROJECTED 3/9/2018 Melanie Larose, PT GP, CL 1    [id number] HC PT EVAL MOD COMPLEXITY 3 3/9/2018 Melanie Larose, PT GP 1          PT G-Codes  PT Professional Judgement Used?: Yes  Outcome Measure Options: AM-PAC 6 Clicks Daily Activity (OT)  Score: 8  Functional Limitation: Mobility: Walking and moving around  Mobility: Walking and Moving Around Current Status (): At least 80 percent but less than 100 percent impaired, limited or restricted  Mobility: Walking and Moving Around Goal Status (): At least 60 percent but less than 80 percent impaired, limited or restricted      Melanie Larose, PT  3/9/2018

## 2018-03-10 LAB
ANION GAP SERPL CALCULATED.3IONS-SCNC: 15 MMOL/L (ref 5–15)
BACTERIA SPEC AEROBE CULT: ABNORMAL
BACTERIA UR QL AUTO: ABNORMAL /HPF
BASOPHILS # BLD AUTO: 0.03 10*3/MM3 (ref 0–0.2)
BASOPHILS NFR BLD AUTO: 0.2 % (ref 0–2)
BILIRUB UR QL STRIP: NEGATIVE
BUN BLD-MCNC: 61 MG/DL (ref 7–21)
BUN/CREAT SERPL: 29.3 (ref 7–25)
CALCIUM SPEC-SCNC: 9.9 MG/DL (ref 8.4–10.2)
CHLORIDE SERPL-SCNC: 102 MMOL/L (ref 95–110)
CLARITY UR: ABNORMAL
CO2 SERPL-SCNC: 26 MMOL/L (ref 22–31)
COLOR UR: YELLOW
CREAT BLD-MCNC: 2.08 MG/DL (ref 0.5–1)
DEPRECATED RDW RBC AUTO: 55.3 FL (ref 36.4–46.3)
EOSINOPHIL # BLD AUTO: 0.32 10*3/MM3 (ref 0–0.7)
EOSINOPHIL NFR BLD AUTO: 2.2 % (ref 0–7)
ERYTHROCYTE [DISTWIDTH] IN BLOOD BY AUTOMATED COUNT: 18 % (ref 11.5–14.5)
GFR SERPL CREATININE-BSD FRML MDRD: 23 ML/MIN/1.73 (ref 39–90)
GLUCOSE BLD-MCNC: 134 MG/DL (ref 60–100)
GLUCOSE BLDC GLUCOMTR-MCNC: 149 MG/DL (ref 70–130)
GLUCOSE BLDC GLUCOMTR-MCNC: 229 MG/DL (ref 70–130)
GLUCOSE BLDC GLUCOMTR-MCNC: 284 MG/DL (ref 70–130)
GLUCOSE BLDC GLUCOMTR-MCNC: 304 MG/DL (ref 70–130)
GLUCOSE UR STRIP-MCNC: ABNORMAL MG/DL
GRAN CASTS URNS QL MICRO: ABNORMAL /LPF
HCT VFR BLD AUTO: 29.6 % (ref 35–45)
HGB BLD-MCNC: 9.5 G/DL (ref 12–15.5)
HGB UR QL STRIP.AUTO: ABNORMAL
HYALINE CASTS UR QL AUTO: ABNORMAL /LPF
IMM GRANULOCYTES # BLD: 0.06 10*3/MM3 (ref 0–0.02)
IMM GRANULOCYTES NFR BLD: 0.4 % (ref 0–0.5)
INR PPP: 1.98 (ref 0.8–1.2)
KETONES UR QL STRIP: NEGATIVE
LEUKOCYTE ESTERASE UR QL STRIP.AUTO: ABNORMAL
LYMPHOCYTES # BLD AUTO: 1.49 10*3/MM3 (ref 0.6–4.2)
LYMPHOCYTES NFR BLD AUTO: 10.2 % (ref 10–50)
MCH RBC QN AUTO: 26.8 PG (ref 26.5–34)
MCHC RBC AUTO-ENTMCNC: 32.1 G/DL (ref 31.4–36)
MCV RBC AUTO: 83.4 FL (ref 80–98)
MONOCYTES # BLD AUTO: 1.16 10*3/MM3 (ref 0–0.9)
MONOCYTES NFR BLD AUTO: 8 % (ref 0–12)
NEUTROPHILS # BLD AUTO: 11.48 10*3/MM3 (ref 2–8.6)
NEUTROPHILS NFR BLD AUTO: 79 % (ref 37–80)
NITRITE UR QL STRIP: NEGATIVE
PH UR STRIP.AUTO: <=5 [PH] (ref 5–9)
PLATELET # BLD AUTO: 282 10*3/MM3 (ref 150–450)
PMV BLD AUTO: 11.2 FL (ref 8–12)
POTASSIUM BLD-SCNC: 3.3 MMOL/L (ref 3.5–5.1)
PROT UR QL STRIP: ABNORMAL
PROTHROMBIN TIME: 21.7 SECONDS (ref 11.1–15.3)
RBC # BLD AUTO: 3.55 10*6/MM3 (ref 3.77–5.16)
RBC # UR: ABNORMAL /HPF
REF LAB TEST METHOD: ABNORMAL
SODIUM BLD-SCNC: 143 MMOL/L (ref 137–145)
SP GR UR STRIP: 1.01 (ref 1–1.03)
SQUAMOUS #/AREA URNS HPF: ABNORMAL /HPF
UROBILINOGEN UR QL STRIP: ABNORMAL
WBC NRBC COR # BLD: 14.54 10*3/MM3 (ref 3.2–9.8)
WBC UR QL AUTO: ABNORMAL /HPF

## 2018-03-10 PROCEDURE — 85610 PROTHROMBIN TIME: CPT | Performed by: HOSPITALIST

## 2018-03-10 PROCEDURE — 97110 THERAPEUTIC EXERCISES: CPT

## 2018-03-10 PROCEDURE — 97530 THERAPEUTIC ACTIVITIES: CPT

## 2018-03-10 PROCEDURE — 94799 UNLISTED PULMONARY SVC/PX: CPT

## 2018-03-10 PROCEDURE — 25010000002 CEFTRIAXONE PER 250 MG: Performed by: HOSPITALIST

## 2018-03-10 PROCEDURE — 25010000002 MORPHINE PER 10 MG: Performed by: HOSPITALIST

## 2018-03-10 PROCEDURE — 85025 COMPLETE CBC W/AUTO DIFF WBC: CPT | Performed by: HOSPITALIST

## 2018-03-10 PROCEDURE — 25010000002 FUROSEMIDE PER 20 MG: Performed by: INTERNAL MEDICINE

## 2018-03-10 PROCEDURE — 81001 URINALYSIS AUTO W/SCOPE: CPT | Performed by: PHYSICIAN ASSISTANT

## 2018-03-10 PROCEDURE — 97535 SELF CARE MNGMENT TRAINING: CPT

## 2018-03-10 PROCEDURE — 82962 GLUCOSE BLOOD TEST: CPT

## 2018-03-10 PROCEDURE — 63710000001 INSULIN ASPART PER 5 UNITS: Performed by: INTERNAL MEDICINE

## 2018-03-10 PROCEDURE — 80048 BASIC METABOLIC PNL TOTAL CA: CPT | Performed by: HOSPITALIST

## 2018-03-10 PROCEDURE — 87086 URINE CULTURE/COLONY COUNT: CPT | Performed by: PHYSICIAN ASSISTANT

## 2018-03-10 RX ORDER — FUROSEMIDE 10 MG/ML
40 INJECTION INTRAMUSCULAR; INTRAVENOUS ONCE
Status: COMPLETED | OUTPATIENT
Start: 2018-03-10 | End: 2018-03-10

## 2018-03-10 RX ORDER — POTASSIUM CHLORIDE 750 MG/1
40 CAPSULE, EXTENDED RELEASE ORAL ONCE
Status: COMPLETED | OUTPATIENT
Start: 2018-03-10 | End: 2018-03-10

## 2018-03-10 RX ORDER — POTASSIUM CHLORIDE 750 MG/1
40 CAPSULE, EXTENDED RELEASE ORAL 2 TIMES DAILY WITH MEALS
Status: COMPLETED | OUTPATIENT
Start: 2018-03-10 | End: 2018-03-11

## 2018-03-10 RX ORDER — FUROSEMIDE 80 MG
80 TABLET ORAL
Status: DISCONTINUED | OUTPATIENT
Start: 2018-03-11 | End: 2018-03-13 | Stop reason: HOSPADM

## 2018-03-10 RX ADMIN — INSULIN ASPART 5 UNITS: 100 INJECTION, SOLUTION INTRAVENOUS; SUBCUTANEOUS at 17:32

## 2018-03-10 RX ADMIN — PANTOPRAZOLE SODIUM 40 MG: 40 TABLET, DELAYED RELEASE ORAL at 06:14

## 2018-03-10 RX ADMIN — CALCITRIOL 0.25 MCG: 0.25 CAPSULE, LIQUID FILLED ORAL at 08:53

## 2018-03-10 RX ADMIN — CLONIDINE HYDROCHLORIDE 0.4 MG: 0.2 TABLET ORAL at 23:32

## 2018-03-10 RX ADMIN — CLONIDINE HYDROCHLORIDE 0.2 MG: 0.2 TABLET ORAL at 08:53

## 2018-03-10 RX ADMIN — ASPIRIN 81 MG: 81 TABLET, COATED ORAL at 08:53

## 2018-03-10 RX ADMIN — CARVEDILOL 12.5 MG: 12.5 TABLET, FILM COATED ORAL at 17:31

## 2018-03-10 RX ADMIN — HYDROCODONE BITARTRATE AND ACETAMINOPHEN 1 TABLET: 7.5; 325 TABLET ORAL at 08:53

## 2018-03-10 RX ADMIN — MORPHINE SULFATE 1 MG: 2 INJECTION, SOLUTION INTRAMUSCULAR; INTRAVENOUS at 06:14

## 2018-03-10 RX ADMIN — INSULIN ASPART 8 UNITS: 100 INJECTION, SOLUTION INTRAVENOUS; SUBCUTANEOUS at 23:31

## 2018-03-10 RX ADMIN — ATORVASTATIN CALCIUM 10 MG: 10 TABLET, FILM COATED ORAL at 08:53

## 2018-03-10 RX ADMIN — NYSTATIN: 100000 POWDER TOPICAL at 08:53

## 2018-03-10 RX ADMIN — FUROSEMIDE 40 MG: 10 INJECTION, SOLUTION INTRAMUSCULAR; INTRAVENOUS at 09:48

## 2018-03-10 RX ADMIN — CLORAZEPATE DIPOTASSIUM 3.75 MG: 3.75 TABLET ORAL at 23:31

## 2018-03-10 RX ADMIN — WARFARIN SODIUM 1 MG: 1 TABLET ORAL at 17:31

## 2018-03-10 RX ADMIN — POTASSIUM CHLORIDE 40 MEQ: 750 CAPSULE, EXTENDED RELEASE ORAL at 17:32

## 2018-03-10 RX ADMIN — HYDROCODONE BITARTRATE AND ACETAMINOPHEN 1 TABLET: 7.5; 325 TABLET ORAL at 23:37

## 2018-03-10 RX ADMIN — POTASSIUM CHLORIDE 40 MEQ: 750 CAPSULE, EXTENDED RELEASE ORAL at 09:48

## 2018-03-10 RX ADMIN — CEFTRIAXONE 1 G: 1 INJECTION, POWDER, FOR SOLUTION INTRAMUSCULAR; INTRAVENOUS at 00:19

## 2018-03-10 RX ADMIN — LEVOTHYROXINE SODIUM 75 MCG: 75 TABLET ORAL at 08:53

## 2018-03-10 RX ADMIN — CARVEDILOL 12.5 MG: 12.5 TABLET, FILM COATED ORAL at 08:53

## 2018-03-10 RX ADMIN — INSULIN ASPART 10 UNITS: 100 INJECTION, SOLUTION INTRAVENOUS; SUBCUTANEOUS at 11:51

## 2018-03-10 NOTE — PROGRESS NOTES
Nephrology Progress Note:    Feels better.  Less SOA.  Good output.  No edema of LL.   No cough.   On IV Rocephin.  WBC up.      Patient Active Problem List   Diagnosis   • MELODY (acute kidney injury)       Medications:    aspirin 81 mg Oral Daily   atorvastatin 10 mg Oral Daily   calcitriol 0.25 mcg Oral Daily   carvedilol 12.5 mg Oral BID With Meals   ceftriaxone 1 g Intravenous Q24H   CloNIDine 0.2 mg Oral Daily   CloNIDine 0.4 mg Oral Nightly   clorazepate 3.75 mg Oral Nightly   insulin aspart 0-14 Units Subcutaneous 4x Daily AC & at Bedtime   levothyroxine 75 mcg Oral Daily   nystatin  Topical Q12H   pantoprazole 40 mg Oral QAM   warfarin 1 mg Oral Once per day on Sun Tue Wed Fri Sat   warfarin 2.5 mg Oral Once per day on Mon Thu       Pharmacy to dose warfarin      Vitals:    03/09/18 2341 03/10/18 0014 03/10/18 0430 03/10/18 0721   BP: 160/74  153/61    BP Location: Left arm  Left arm    Patient Position: Lying  Lying    Pulse: 70 69 70 80   Resp: 18  18    Temp: 97.2 °F (36.2 °C)  96.2 °F (35.7 °C)    TempSrc: Oral  Oral    SpO2: 100%  96%    Weight:       Height:         I/O last 3 completed shifts:  In: 340 [P.O.:240; IV Piggyback:100]  Out: 3500 [Urine:3500]  I/O this shift:  In: -   Out: 200 [Urine:200]    On examination:  General:Alert and oriented, wanting to go home  HEENT: Pallor present, no icterus, eye movements are normal  Neck: No JVP or thyroid enlargement  Chest: Coarse breath sounds at the lung bases.  Air entry appears equal bilaterally   CVS: Heart sounds are irregular.  No pericardial rub or gallop  Abdomen: Distended, soft, normal bowel sounds, no organomegaly.  No rebound or guarding  Extremities: Trace edema of the lower extremities, much improved  Neuro: Grade 4 power upper and lower extremities.  No asterixis  Mentation: Alert and oriented    Past medical illness, social history, medications, previous notes reviewed.       Laboratory results:      Recent Results (from the past 24  hour(s))   Basic Metabolic Panel    Collection Time: 03/09/18  8:09 AM   Result Value Ref Range    Glucose 206 (H) 60 - 100 mg/dL    BUN 71 (H) 7 - 21 mg/dL    Creatinine 2.57 (H) 0.50 - 1.00 mg/dL    Sodium 141 137 - 145 mmol/L    Potassium 4.2 3.5 - 5.1 mmol/L    Chloride 104 95 - 110 mmol/L    CO2 23.0 22.0 - 31.0 mmol/L    Calcium 10.3 (H) 8.4 - 10.2 mg/dL    eGFR Non African Amer 18 (L) >60 mL/min/1.73    BUN/Creatinine Ratio 27.6 (H) 7.0 - 25.0    Anion Gap 14.0 5.0 - 15.0 mmol/L   Protime-INR    Collection Time: 03/09/18  8:09 AM   Result Value Ref Range    Protime 22.2 (H) 11.1 - 15.3 Seconds    INR 2.04 (H) 0.80 - 1.20   Adult Transthoracic Echo Complete W/ Cont if Necessary Per Protocol    Collection Time: 03/09/18 10:51 AM   Result Value Ref Range    BSA 1.6 m^2     CV ECHO YASMIN - RVDD 2.4 cm    IVSd 1.1 cm    LVIDd 4.6 cm    LVIDs 2.9 cm    LVPWd 1.1 cm    IVS/LVPW 1.0     FS 37.0 %    EDV(Teich) 97.3 ml    ESV(Teich) 32.2 ml    EF(Teich) 66.9 %    EDV(cubed) 97.3 ml    ESV(cubed) 24.4 ml    EF(cubed) 74.9 %    LV mass(C)d 181.2 grams    LV mass(C)dI 114.2 grams/m^2    SV(Teich) 65.1 ml    SI(Teich) 41.0 ml/m^2    SV(cubed) 72.9 ml    SI(cubed) 46.0 ml/m^2    Ao root diam 3.4 cm    Ao root area 9.1 cm^2    ACS 1.9 cm    LA dimension 3.8 cm    asc Aorta Diam 2.8 cm    Ao Isth Diam 2.4 cm    LA/Ao 1.1     LVOT diam 2.0 cm    LVOT area 3.1 cm^2    LVOT area(traced) 3.1 cm^2    Ao root area (BSA corrected) 2.1     MV E max mary ellen 90.3 cm/sec    MV A max mary ellen 129.0 cm/sec    MV E/A 0.7     MV V2 max 137.0 cm/sec    MV max PG 7.5 mmHg    MV V2 mean 74.9 cm/sec    MV mean PG 3.0 mmHg    MV V2 VTI 25.9 cm    MVA(VTI) 2.9 cm^2    MV P1/2t max mary ellen 104.0 cm/sec    MV P1/2t 42.2 msec    MVA(P1/2t) 5.2 cm^2    MV dec slope 721.0 cm/sec^2    Ao pk mary ellen 124.0 cm/sec    Ao max PG 6.2 mmHg    Ao max PG (full) 2.7 mmHg    Ao V2 mean 83.2 cm/sec    Ao mean PG 3.0 mmHg    Ao mean PG (full) 1.0 mmHg    Ao V2 VTI 24.8 cm     SABRA(I,A) 3.0 cm^2    SABRA(I,D) 3.0 cm^2    SABRA(V,A) 2.4 cm^2    SABRA(V,D) 2.4 cm^2    LV V1 max PG 3.4 mmHg    LV V1 mean PG 2.0 mmHg    LV V1 max 92.8 cm/sec    LV V1 mean 62.0 cm/sec    LV V1 VTI 23.8 cm    SV(Ao) 225.2 ml    SI(Ao) 141.9 ml/m^2    SV(LVOT) 74.8 ml    SI(LVOT) 47.1 ml/m^2    PA V2 max 96.4 cm/sec    PA max PG 3.7 mmHg    PI end-d mary ellen 158.0 cm/sec    TR max mary ellen 340.0 cm/sec    RVSP(TR) 51.3 mmHg    RAP systole 5.0 mmHg    MVA P1/2T LCG 2.1 cm^2     CV ECHO YASMIN - BZI_BMI 23.6 kilograms/m^2     CV ECHO YASMIN - BSA(HAYCOCK) 1.6 m^2     CV ECHO YASMIN - BZI_METRIC_WEIGHT 58.5 kg     CV ECHO YASMIN - BZI_METRIC_HEIGHT 157.5 cm    Target HR (85%) 122 bpm    Max. Pred. HR (100%) 144 bpm   POC Glucose Once    Collection Time: 03/09/18 10:58 AM   Result Value Ref Range    Glucose 223 (H) 70 - 130 mg/dL   POC Glucose Once    Collection Time: 03/09/18  4:49 PM   Result Value Ref Range    Glucose 270 (H) 70 - 130 mg/dL   POC Glucose Once    Collection Time: 03/09/18  9:08 PM   Result Value Ref Range    Glucose 202 (H) 70 - 130 mg/dL   Basic Metabolic Panel    Collection Time: 03/10/18  6:04 AM   Result Value Ref Range    Glucose 134 (H) 60 - 100 mg/dL    BUN 61 (H) 7 - 21 mg/dL    Creatinine 2.08 (H) 0.50 - 1.00 mg/dL    Sodium 143 137 - 145 mmol/L    Potassium 3.3 (L) 3.5 - 5.1 mmol/L    Chloride 102 95 - 110 mmol/L    CO2 26.0 22.0 - 31.0 mmol/L    Calcium 9.9 8.4 - 10.2 mg/dL    eGFR Non  Amer 23 (L) 39 - 90 mL/min/1.73    BUN/Creatinine Ratio 29.3 (H) 7.0 - 25.0    Anion Gap 15.0 5.0 - 15.0 mmol/L   Protime-INR    Collection Time: 03/10/18  6:04 AM   Result Value Ref Range    Protime 21.7 (H) 11.1 - 15.3 Seconds    INR 1.98 (H) 0.80 - 1.20   CBC Auto Differential    Collection Time: 03/10/18  6:04 AM   Result Value Ref Range    WBC 14.54 (H) 3.20 - 9.80 10*3/mm3    RBC 3.55 (L) 3.77 - 5.16 10*6/mm3    Hemoglobin 9.5 (L) 12.0 - 15.5 g/dL    Hematocrit 29.6 (L) 35.0 - 45.0 %    MCV 83.4 80.0 - 98.0  fL    MCH 26.8 26.5 - 34.0 pg    MCHC 32.1 31.4 - 36.0 g/dL    RDW 18.0 (H) 11.5 - 14.5 %    RDW-SD 55.3 (H) 36.4 - 46.3 fl    MPV 11.2 8.0 - 12.0 fL    Platelets 282 150 - 450 10*3/mm3    Neutrophil % 79.0 37.0 - 80.0 %    Lymphocyte % 10.2 10.0 - 50.0 %    Monocyte % 8.0 0.0 - 12.0 %    Eosinophil % 2.2 0.0 - 7.0 %    Basophil % 0.2 0.0 - 2.0 %    Immature Grans % 0.4 0.0 - 0.5 %    Neutrophils, Absolute 11.48 (H) 2.00 - 8.60 10*3/mm3    Lymphocytes, Absolute 1.49 0.60 - 4.20 10*3/mm3    Monocytes, Absolute 1.16 (H) 0.00 - 0.90 10*3/mm3    Eosinophils, Absolute 0.32 0.00 - 0.70 10*3/mm3    Basophils, Absolute 0.03 0.00 - 0.20 10*3/mm3    Immature Grans, Absolute 0.06 (H) 0.00 - 0.02 10*3/mm3   POC Glucose Once    Collection Time: 03/10/18  7:33 AM   Result Value Ref Range    Glucose 149 (H) 70 - 130 mg/dL   ]    Imaging Results (last 24 hours)     Procedure Component Value Units Date/Time    XR Chest 1 View [[id number]] Collected:  03/09/18 1254     Updated:  03/09/18 1332    Narrative:         PORTABLE CHEST    HISTORY: Fever    Portable AP film of the chest was obtained at 1:01 PM.  COMPARISON: None    EKG leads.  Coronary artery bypass.  Cardiomegaly, pulmonary vascular congestion and interstitial  edema.  Linear atelectasis or scar left midlung.  No pleural effusion.  No pneumothorax.  No acute osseous abnormality.  Advanced degenerative changes each shoulder.      Impression:       CONCLUSION:  Coronary artery bypass.  Cardiomegaly, pulmonary vascular congestion and interstitial  edema.  Linear atelectasis or scar left midlung.    [id number]    Electronically signed by:  Sebastien Zambrano MD  3/9/2018 1:31 PM [employer]  Workstation: [email or web address]            Assessment:     Acute renal failure  Stage IV chronic kidney disease, baseline serum creatinine around 2.5  Congestive heart failure, stable volume status  History of arthralgia, gout  Urinary tract infection  Anemia  Type 2 diabetes mellitus  Secondary  hyperparathyroidism  History of GI bleeding in the pastfever, being evaluated       Plan:     Acute renal failure, stage IV chronic kidney disease.  Baseline serum creatinine of 2.4-2.8.  Renal functions are improving.  Admission lab investigations with a serum creatinine close to 4.  Entresto and diuretics are on hold.  Creat 2.   Good output.      History of congestive heart failure and edema.  Off IVF.   Received IV Lasix yesterday.   Plan 40 mg IV Lasix today, and start oral diuretics tomorrow.      Urinary tract infection: On Rocephin.  Urine cultures with Klebsiella, sensitive to ceftriaxone.  WBC up.  CXR neg for infiltrates.      Type 2 diabetes: Hemoglobin A1c was 8.8.  Patient is on insulin.  Not on metformin.  As proteinuria.    History of arthralgia and gout: No NSAIDs.    Hypokalemia:   KCL 40 mEq PO today.  Was on oral supplements at home and restart    Discussed with patient and .       Devon Downing MD

## 2018-03-10 NOTE — PROGRESS NOTES
[facility] Medicine Services  INPATIENT PROGRESS NOTE    Length of Stay: 4  Date of Admission: 3/6/2018  Primary Care Physician: Markell Muller MD    Subjective   Please note that all previous progress notes, radiographical findings, lab findings, medication changes, and physical exam findings have been noted and updated as appropriate.    3/10/2018: Patient continues to feel better, continues to have no acute complaints.  Patient growing both Klebsiella and Proteus, both sensitive to ceftriaxone which the patient is on.  Creatinine continues to improve.  White count did trend upward, will continue to monitor.  No shortness of air, fever, chills, nausea, vomiting.  No chest pain.    3/9/2018: Patient has no acute complaints.  Did have a fever overnight, urinalysis demonstrating gram-negative bacilli and Klebsiella, sensitivity so far shows ceftriaxone as an appropriate drug.  Patient is on ceftriaxone.  Denies shortness of air, nausea, vomiting.  Repeat blood cultures pending.  Creatinine near baseline per nephrology.  Consult appreciated.    Chief Complaint/HPI:  This 77 year old  female was emitted to Hospital services secondary to multiple medical comorbidities.  Patient has a history of stage IV chronic kidney disease, congestive heart failure, hypertension, diabetes mellitus type 2, anemia of chronic kidney disease.  Patient was recently started on Entresto by her cardiologist.  Patient began to experience increased fatigue and weakness, itching, medication was discontinued.  At that time patient complained of increased edema of the lower extremities and patient was treated with double doses of Lasix.  Patient was taking Lasix 80 mg by mouth twice a day, meaning that this was the dose was doubled.  At the time of admission creatinine had risen to 4.34.    Patient also demonstrates gram negative bacilli in the urine.  This is likely Escherichia  coli.  Currently on ceftriaxone.  At this time will order echocardiogram to further evaluate valvular function.  We will order PT and OT secondary to deconditioning.  Creatinine improving, patient denies any complaints at this time.    Review of Systems   Constitutional: Negative for chills and fever.   Respiratory: Negative for shortness of breath.    Cardiovascular: Negative for chest pain.   Gastrointestinal: Negative for abdominal pain, nausea and vomiting.   Neurological: Negative for dizziness and light-headedness.      All pertinent negatives and positives are as above. All other systems have been reviewed and are negative unless otherwise stated.     Objective    Temp:  [96.2 °F (35.7 °C)-99.1 °F (37.3 °C)] 98.1 °F (36.7 °C)  Heart Rate:  [59-83] 76  Resp:  [18-19] 18  BP: (133-160)/(60-74) 140/60    Physical Exam   Constitutional: She appears well-developed and well-nourished.   HENT:   Head: Normocephalic and atraumatic.   Cardiovascular: Normal rate and regular rhythm.    Pulmonary/Chest: Effort normal. No respiratory distress.   Abdominal: Soft. Bowel sounds are normal. She exhibits no distension. There is no tenderness.   Neurological: She is alert.   Skin: Skin is warm and dry.     Results Review:  I have reviewed the labs, radiology results, and diagnostic studies.    Laboratory Data:     Results from last 7 days  Lab Units 03/10/18  0604 03/09/18  0809 03/09/18  0452 03/08/18  0734  03/06/18  2332   SODIUM mmol/L 143 141  --  141  < > 141   POTASSIUM mmol/L 3.3* 4.2 4.2 2.9*  < > 3.3*   CHLORIDE mmol/L 102 104  --  100  < > 95   CO2 mmol/L 26.0 23.0  --  24.0  < > 24.0   BUN mg/dL 61* 71*  --  77*  < > 96*   CREATININE mg/dL 2.08* 2.57*  --  3.15*  < > 4.34*   GLUCOSE mg/dL 134* 206*  --  189*  < > 212*   CALCIUM mg/dL 9.9 10.3*  --  9.6  < > 9.6   BILIRUBIN mg/dL  --   --   --   --   --  0.6   ALK PHOS U/L  --   --   --   --   --  83   ALT (SGPT) U/L  --   --   --   --   --  29   AST (SGOT) U/L  --    --   --   --   --  16   ANION GAP mmol/L 15.0 14.0  --  17.0*  < > 22.0*   < > = values in this interval not displayed.  Estimated Creatinine Clearance: 21.3 mL/min (by C-G formula based on SCr of 2.08 mg/dL (H)).            Results from last 7 days  Lab Units 03/10/18  0604 03/08/18  0734 03/07/18  0149 03/06/18  2332   WBC 10*3/mm3 14.54* 12.99* 10.46* 11.17*   HEMOGLOBIN g/dL 9.5* 11.0* 10.8* 11.5*   HEMATOCRIT % 29.6* 33.0* 32.3* 34.2*   PLATELETS 10*3/mm3 282 320 290 323       Results from last 7 days  Lab Units 03/10/18  0604 03/09/18  0809 03/08/18  0734 03/07/18  0444   INR  1.98* 2.04* 2.05* 2.19*       Culture Data:   Blood Culture   Date Value Ref Range Status   03/09/2018 No growth at 24 hours  Preliminary   03/09/2018 No growth at 24 hours  Preliminary     No results found for: URINECX  No results found for: RESPCX  No results found for: WOUNDCX  No results found for: STOOLCX  No components found for: BODYFLD    Radiology Data:   Imaging Results (last 24 hours)     Procedure Component Value Units Date/Time    XR Chest 1 View [[id number]] Collected:  03/09/18 1254     Updated:  03/09/18 1332    Narrative:         PORTABLE CHEST    HISTORY: Fever    Portable AP film of the chest was obtained at 1:01 PM.  COMPARISON: None    EKG leads.  Coronary artery bypass.  Cardiomegaly, pulmonary vascular congestion and interstitial  edema.  Linear atelectasis or scar left midlung.  No pleural effusion.  No pneumothorax.  No acute osseous abnormality.  Advanced degenerative changes each shoulder.      Impression:       CONCLUSION:  Coronary artery bypass.  Cardiomegaly, pulmonary vascular congestion and interstitial  edema.  Linear atelectasis or scar left midlung.    [id number]    Electronically signed by:  Sebastien Zambrano MD  3/9/2018 1:31 PM [facility]  Workstation: [id number]          I have reviewed the patient current medications.     Assessment/Plan     Hospital Problem List     MELODY (acute kidney injury)          Plan:   Monitor WBC and creatinine, PT/OT, discharge planning.                This document has been electronically signed by SAMRA Mulligan on March 10, 2018 11:14 AM

## 2018-03-10 NOTE — PROGRESS NOTES
"Anticoagulation by Pharmacy - Warfarin    Dorcas Bain is a 77 y.o.female  [Ht: 157.5 cm (62\"); Wt: 58.5 kg (129 lb)] on Warfarin alternating doses of 1 mg daily except 2.5 mg on Mon and Thu PO  for indication of a fib.    Goal INR: 2-3  Today's INR:   Lab Results   Component Value Date    INR 1.98 (H) 03/10/2018         Lab Results   Component Value Date    INR 1.98 (H) 03/10/2018    INR 2.04 (H) 03/09/2018    INR 2.05 (H) 03/08/2018    PROTIME 21.7 (H) 03/10/2018    PROTIME 22.2 (H) 03/09/2018    PROTIME 22.3 (H) 03/08/2018     Lab Results   Component Value Date    HGB 9.5 (L) 03/10/2018    HGB 11.0 (L) 03/08/2018    HGB 10.8 (L) 03/07/2018     Lab Results   Component Value Date    HCT 29.6 (L) 03/10/2018    HCT 33.0 (L) 03/08/2018    HCT 32.3 (L) 03/07/2018     Lab Results   Component Value Date     03/10/2018     03/08/2018     03/07/2018     Assessment/Plan:  Reviewed above labs. INR is 1.98.  INR is slightly below goal. No changes in medication list. Concurrent medications include aspirin and levothyroxine. Pt did receive dose of warfarin last night.  Will continue current home dose for now until we see a trend. Dose may need to increase. Rx will continue to follow and adjust dose accordingly.      Shilpa Hilliard [facility]  03/10/18 10:33 AM     "

## 2018-03-10 NOTE — PLAN OF CARE
Problem: Patient Care Overview  Goal: Plan of Care Review  Outcome: Ongoing (interventions implemented as appropriate)   03/10/18 1505   Coping/Psychosocial   Plan of Care Reviewed With patient   Plan of Care Review   Progress no change   OTHER   Outcome Summary pt vss. Pain controlled this shift.     Goal: Individualization and Mutuality  Outcome: Ongoing (interventions implemented as appropriate)    Goal: Discharge Needs Assessment  Outcome: Ongoing (interventions implemented as appropriate)      Problem: Renal Failure/Kidney Injury, Acute (Adult)  Goal: Signs and Symptoms of Listed Potential Problems Will be Absent, Minimized or Managed (Renal Failure/Kidney Injury, Acute)  Outcome: Ongoing (interventions implemented as appropriate)      Problem: Wound (Includes Pressure Injury) (Adult)  Goal: Signs and Symptoms of Listed Potential Problems Will be Absent, Minimized or Managed (Wound)  Outcome: Ongoing (interventions implemented as appropriate)      Problem: Pain, Chronic (Adult)  Goal: Identify Related Risk Factors and Signs and Symptoms  Outcome: Outcome(s) achieved Date Met: 03/10/18    Goal: Acceptable Pain/Comfort Level and Functional Ability  Outcome: Ongoing (interventions implemented as appropriate)

## 2018-03-10 NOTE — THERAPY TREATMENT NOTE
Acute Care - Physical Therapy Treatment Note  [facility]     Patient Name: Dorcas Bain  : 1940  MRN: [id number]  Today's Date: 3/10/2018     Date of Referral to PT: 18       Admit Date: 3/6/2018    Visit Dx:    ICD-10-CM ICD-9-CM   1. Impaired physical mobility Z74.09 781.99   2. Impaired mobility and activities of daily living Z74.09 799.89     Patient Active Problem List   Diagnosis   • MELODY (acute kidney injury)       Therapy Treatment    Therapy Treatment / Health Promotion    Treatment Time/Intention  Discipline: physical therapy assistant  Document Type: therapy note (daily note)  Subjective Information: complains of, fatigue, pain  Mode of Treatment: individual therapy, physical therapy  Patient Effort: good  Comment: pt with poor cervical posture when EOB and frequent cues needed to hlep keep head in upright position.   Plan of Care Review  Plan of Care Reviewed With: patient, spouse    Vitals/Pain/Safety  Vital Signs  Pre Systolic BP Rehab: 111  Pre Treatment Diastolic BP: 54  Post Systolic BP Rehab: 138  Post Treatment Diastolic BP: 61  Pretreatment Heart Rate (beats/min): 62  Intratreatment Heart Rate (beats/min): 74  Posttreatment Heart Rate (beats/min): 76  Pre SpO2 (%): 98  O2 Delivery Pre Treatment: supplemental O2  Intra SpO2 (%): 97  O2 Delivery Intra Treatment: supplemental O2  Post SpO2 (%): 98  O2 Delivery Post Treatment: supplemental O2  Pre Patient Position: Supine  Intra Patient Position: Sitting  Post Patient Position: Supine  Pain Assessment  Additional Documentation: Pain Scale: Numbers Pre/Post-Treatment (Group)  Pain Scale: Numbers Pre/Post-Treatment  Pain Scale: Numbers, Pretreatment: 0/10 - no pain  Pain Scale: Numbers, Post-Treatment: 3/10  Pain Location - Side: Right  Pain Location: knee  Pain Intervention(s): Repositioned, Rest  Pain Scale: Word Pre/Post-Treatment  Pain Location - Side: Right  Pain Location: knee  Pain Intervention(s): Repositioned, Rest  Pain  Scale: FACES Pre/Post-Treatment  Pain Location - Side: Right  Pain Location: knee  Pain Intervention(s): Repositioned, Rest  Safety Issues, Functional Mobility  Safety Issues Affecting Function (Mobility): safety precaution awareness  Impairments Affecting Function (Mobility): balance, postural/trunk control, pain, range of motion (ROM), coordination, endurance/activity tolerance  Comment, Safety Issues/Impairments (Mobility): pt with fwd flexed cervical spine while sitting EOB requiring cues for more upright neck/ trunk  Positioning and Restraints  Pre-Treatment Position: in bed  Post Treatment Position: bed  In Bed: supine, call light within reach, encouraged to call for assist, exit alarm on, with family/caregiver    Mobility,ADL,Motor, Modality  Bed Mobility Assessment/Treatment  Rolling Left [place] (Bed Mobility): verbal cues, minimum assist (75% patient effort), moderate assist (50% patient effort)  Rolling Right [place] (Bed Mobility): verbal cues, minimum assist (75% patient effort), moderate assist (50% patient effort)  Supine-Sit [place] (Bed Mobility): moderate assist (50% patient effort)  Sit-Supine [place] (Bed Mobility): minimum assist (75% patient effort), verbal cues  Assistive Device (Bed Mobility): bed rails, draw sheet, head of bed elevated  Comment (Bed Mobility): Mod A x1 req to assist pt to EOB. Pt able to sit with SBA while performing BLE ther ex. frequent cues given for cervical posture and upright trunk.  Pt c/o pain in right knee with all bed mobility.   Transfer Assessment/Treatment  Transfer Assessment/Treatment: sit-stand transfer, stand-sit transfer  Comment (Transfers): Pt stood x2 with RW and on second attempt able to side step to HOB with moderate verbal cues  Sit-Stand Transfer  Sit-Stand [place] (Transfers): minimum assist (75% patient effort)  Assistive Device (Sit-Stand Transfers): walker, front-wheeled  Stand-Sit Transfer  Stand-Sit [place]  (Transfers): contact guard, verbal cues  Assistive Device (Stand-Sit Transfers): walker, front-wheeled  Gait/Stairs Assessment/Training  Gait/Stairs Assessment/Training: gait/ambulation independence, gait/ambulation assistive device, distance ambulated  [place] Level (Gait): minimum assist (75% patient effort), moderate assist (50% patient effort)  Assistive Device (Gait): walker, front-wheeled  Distance in Feet (Gait): 4  Comment (Gait/Stairs): side stepping towards HOB with cues for weight shifting and sequence     Therapeutic Exercise  Lower Extremity (Therapeutic Exercise): gluteal sets, LAQ (long arc quad), bilateral, marching while seated (AP while seated, bilateral)  Lower Extremity Range of Motion (Therapeutic Exercise): ankle dorsiflexion/plantar flexion, bilateral  Exercise Type (Therapeutic Exercise): AROM (active range of motion)  Position (Therapeutic Exercise): seated  Sets/Reps (Therapeutic Exercise): 10x1           ROM/MMT             Sensory, Edema, Orthotics          Cognition, Communication, Swallow  Cognitive Assessment/Intervention- PT/OT  Affect/Mental Status (Cognitive): WFL  Orientation Status (Cognition): oriented x 3  Follows Commands (Cognition): 75-90% accuracy  Speaking Valve  Pretreatment Heart Rate (beats/min): 62  Pre SpO2 (%): 98  Posttreatment Heart Rate (beats/min): 76  Post SpO2 (%): 98  General Eating/Swallowing Observations  Pre SpO2 (%): 98  Post SpO2 (%): 98    Outcome Summary  Outcome Summary/Treatment Plan (PT)  Daily Summary of Progress (PT): progress toward functional goals is gradual  Plan for Continued Treatment (PT): continue  Anticipated Discharge Disposition (PT): home with home health care (24/7 care)            PT Rehab Goals     Row Name 03/10/18 1300             Bed Mobility Goal 1 (PT)    Activity/Assistive Device (Bed Mobility Goal 1, PT) sit to supine/supine to sit  -SM      [place] Level/Cues Needed (Bed Mobility Goal 1, PT) contact guard assist  -SM       Time Frame (Bed Mobility Goal 1, PT) 2 days;3 days  -SM      Barriers (Bed Mobility Goal 1, PT) HOB down and no rails, head propped with multiple pillows  -SM      Progress/Outcomes (Bed Mobility Goal 1, PT) goal ongoing  -SM         Transfer Goal 1 (PT)    Activity/Assistive Device (Transfer Goal 1, PT) bed-to-chair/chair-to-bed;sit-to-stand/stand-to-sit;walker, rolling;cane, straight  -SM      [place] Level/Cues Needed (Transfer Goal 1, PT) minimum assist (75% or more patient effort)  -SM      Time Frame (Transfer Goal 1, PT) 2 - 3 days  -SM      Progress/Outcome (Transfer Goal 1, PT) goal ongoing  -SM         Gait Training Goal 1 (PT)    Activity/Assistive Device (Gait Training Goal 1, PT) gait (walking locomotion);walker, rolling;cane, straight  -SM      [place] Level (Gait Training Goal 1, PT) contact guard assist  -SM      Distance (Gait Goal 1, PT) 100'  -SM      Time Frame (Gait Training Goal 1, PT) by discharge  -SM      Progress/Outcome (Gait Training Goal 1, PT) goal ongoing  -SM         Strength Goal 1 (PT)    Strength Goal 1 (PT) 20 reps all ex BLE AROM, For pt to be able to get BLE up onto bed  Independently   -SM      Time Frame (Strength Goal 1, PT) 2 days;3 days  -SM      Progress/Outcome (Strength Goal 1, PT) goal ongoing  -SM        User Key  (r) = Recorded By, (t) = Taken By, (c) = Cosigned By    Initials Name Provider Type    SRAVANI Mehta PTA Physical Therapy Assistant          Physical Therapy Education     Title: PT OT SLP Therapies (Active)     Topic: Physical Therapy (Active)     Point: Mobility training (Active)    Learning Progress Summary     Learner Status Readiness Method Response Comment Documented by    Patient Active Acceptance E,TB,D NR pt and spouse educated on safety and use of gait belt. Spouse able to demonstrate proper use of belt to assist pt at home SM 03/10/18 1334     Active Acceptance D NR  CB 03/09/18 1157    Family Active Acceptance D NR  CB  03/09/18 1157    Significant Other Active Acceptance E,TB,D NR pt and spouse educated on safety and use of gait belt. Spouse able to demonstrate proper use of belt to assist pt at home  03/10/18 1334          Point: Precautions (Active)    Learning Progress Summary     Learner Status Readiness Method Response Comment Documented by    Patient Active Acceptance D NR   03/09/18 1157    Family Active Acceptance D NR   03/09/18 1157                      User Key     Initials Effective Dates Name Provider Type Discipline     04/06/17 -  Melanie Larose, PT Physical Therapist PT     03/07/18 -  Merna Mehta, PTA Physical Therapy Assistant PT                    PT Recommendation and Plan  Anticipated Discharge Disposition (PT): home with home health care (24/7 care)  Daily Summary of Progress (PT): progress toward functional goals is gradual  Plan for Continued Treatment (PT): continue  Plan of Care Reviewed With: patient, spouse  Progress: improving  Outcome Summary: Pt required mod A x1 to get EOB and c/o pain in R knee during all bed mobility. Pt having difficulty maintaining head/ neck in neutral position and frequent cues needed for posture. Pt min A to stand with RW and mon/ mod assistance to side step to HOB. O2 stable throughout activity. Pt would benefit from skilled PT to progress towards functional goals. if discharged this date pt will need 24/7 care. and continued rehab recommended.           Outcome Measures     Row Name 03/10/18 0855 03/09/18 1040 03/09/18 1039       How much help from another person do you currently need...    Turning from your back to your side while in flat bed without using bedrails?  --  -- 2  -CB    Moving from lying on back to sitting on the side of a flat bed without bedrails?  --  -- 2  -CB    Moving to and from a bed to a chair (including a wheelchair)?  --  -- 1  -CB    Standing up from a chair using your arms (e.g., wheelchair, bedside chair)?  --  -- 1  -CB     Climbing 3-5 steps with a railing?  --  -- 1  -CB    To walk in hospital room?  --  -- 1  -CB    AM-PAC 6 Clicks Score  --  -- 8  -CB       How much help from another is currently needed...    Putting on and taking off regular lower body clothing? 1  - 1  -MR  --    Bathing (including washing, rinsing, and drying) 2  - 2  -MR  --    Toileting (which includes using toilet bed pan or urinal) 2  - 2  -MR  --    Putting on and taking off regular upper body clothing 2  - 2  -MR  --    Taking care of personal grooming (such as brushing teeth) 3  - 3  -MR  --    Eating meals 3  - 3  -MR  --    Score 13  - 13  -MR  --       Functional Assessment    Outcome Measure Options AM-PAC 6 Clicks Daily Activity (OT)  - AM-PAC 6 Clicks Daily Activity (OT)  - AM-PAC 6 Clicks Basic Mobility (PT)  -      User Key  (r) = Recorded By, (t) = Taken By, (c) = Cosigned By    Initials Name Provider Type     Melanie Larose, PT Physical Therapist     Germaine Avila, OTR/L Occupational Therapist     Ashley Mccoy, OT Occupational Therapist           Time Calculation:         PT Charges     Row Name 03/10/18 1333             Time Calculation    Start Time 1038  -      Stop Time 1121  -      Time Calculation (min) 43 min  -      PT Received On 03/10/18  -         Time Calculation- PT    Total Timed Code Minutes- PT 43 minute(s)  -        User Key  (r) = Recorded By, (t) = Taken By, (c) = Cosigned By    Initials Name Provider Type     Merna Mehta PTA Physical Therapy Assistant          Therapy Charges for Today     Code Description Service Date Service Provider Modifiers Qty    15034856025 HC PT THER PROC EA 15 MIN 3/10/2018 Merna Mehta PTA GP 1    28390971811 HC PT THERAPEUTIC ACT EA 15 MIN 3/10/2018 Merna Mehta PTA GP 2          PT G-Codes  PT Professional Judgement Used?: Yes  Outcome Measure Options: AM-PAC 6 Clicks Daily Activity (OT)  Score: 8  Functional Limitation: Mobility:  Walking and moving around  Mobility: Walking and Moving Around Current Status (): At least 80 percent but less than 100 percent impaired, limited or restricted  Mobility: Walking and Moving Around Goal Status (): At least 60 percent but less than 80 percent impaired, limited or restricted    Merna Mehta, PTA  3/10/2018

## 2018-03-10 NOTE — THERAPY TREATMENT NOTE
Acute Care - Occupational Therapy Treatment Note  [facility]     Patient Name: Dorcas Bain  : 1940  MRN: [id number]  Today's Date: 3/10/2018     Date of Referral to OT: 18       Admit Date: 3/6/2018       ICD-10-CM ICD-9-CM   1. Impaired physical mobility Z74.09 781.99   2. Impaired mobility and activities of daily living Z74.09 799.89     Patient Active Problem List   Diagnosis   • MELODY (acute kidney injury)     Past Medical History:   Diagnosis Date   • CHF (congestive heart failure)    • Diabetes mellitus    • GERD (gastroesophageal reflux disease)    • Hypercholesteremia    • Hypertension    • S/P CABG (coronary artery bypass graft)      Past Surgical History:   Procedure Laterality Date   • CORONARY ARTERY BYPASS GRAFT     • RENAL ARTERY STENT         Therapy Treatment    Therapy Treatment / Health Promotion    Treatment Time/Intention  Discipline: occupational therapist  Document Type: therapy note (daily note)  Subjective Information: complains of, weakness, fatigue, pain  Mode of Treatment: individual therapy, occupational therapy  Patient/Family Observations: Pt supine HOB up on o2, IV, bed alarm  Total Minutes, Occupational Therapy Treatment: 35  Therapy Frequency (OT Eval): other (see comments) (3-14x a week)  Patient Effort: good (required encouargement and redirection)  Comment: pt c/o of pain with her right knee but did attempt to stand and side step with max encouargement.   Treatment Considerations/Comments: fall risk/ O2 precautions  Plan of Care Review  Plan of Care Reviewed With: patient, spouse    Vitals/Pain/Safety  Vital Signs  Pre Systolic BP Rehab: 133  Pre Treatment Diastolic BP: 61  Pretreatment Heart Rate (beats/min): 81  Posttreatment Heart Rate (beats/min): 80  Pre SpO2 (%): 99  O2 Delivery Pre Treatment: supplemental O2  Post SpO2 (%): 99  O2 Delivery Post Treatment: supplemental O2  Pre Patient Position: Supine  Post Patient Position: Supine  Pain  Assessment  Additional Documentation: Pain Scale: Numbers Pre/Post-Treatment (Group)  Pain Scale: Numbers Pre/Post-Treatment  Pain Scale: Numbers, Pretreatment: 8/10  Pain Scale: Numbers, Post-Treatment: 6/10  Pain Location: knee  Pain Intervention(s): Medication (See MAR), Repositioned, Ambulation/increased activity, Rest  Pain Scale: Word Pre/Post-Treatment  Pain Location: knee  Pain Intervention(s): Medication (See MAR), Repositioned, Ambulation/increased activity, Rest  Pain Scale: FACES Pre/Post-Treatment  Pain Location: knee  Pain Intervention(s): Medication (See MAR), Repositioned, Ambulation/increased activity, Rest  Safety Issues, Functional Mobility  Safety Issues Affecting Function (Mobility): impulsivity, safety precaution awareness  Impairments Affecting Function (Mobility): balance, cognition, coordination, endurance/activity tolerance, motor planning, pain, range of motion (ROM)  Comment, Safety Issues/Impairments (Mobility): Pt side stepped towards the HOB with mod difficulty max encouragement. Pt fatigued with task and reported increased pain. Pt required assist to move her RW sideways an for safety cues to reach for the bed to sit down.   Positioning and Restraints  Pre-Treatment Position: in bed  Post Treatment Position: bed  In Bed: notified nsg, supine, fowlers, call light within reach, encouraged to call for assist, patient within staff view, exit alarm on    Mobility,ADL,Motor, Modality  Bed Mobility Assessment/Treatment  Scooting/Bridging [place] (Bed Mobility): 2 person assist, dependent (less than 25% patient effort)  Supine-Sit [place] (Bed Mobility): moderate assist (50% patient effort)  Sit-Supine [place] (Bed Mobility): minimum assist (75% patient effort), verbal cues  Assistive Device (Bed Mobility): bed rails, draw sheet, head of bed elevated  Comment (Bed Mobility): Pt c/o of pain with movement and struggled to get to the EOB and follow OT instructions. Pt requried use  of draw sheet to get to the EOB once seated but then once closer used her hands to help push herself up towards the EOB. Pt sat total approx 10 minutes with fatiging and c/o of increased knee pain. Pt required encouragement to assist OT and participate at one point yelling she couldn't do something (scoot back towards middle of bed while seated ) but then she half stood up and scooted some. Pt required assist with her legs onto the bed and positioning of her trunk. She did not assist with scooting up towards HOB.   Transfer Assessment/Treatment  Transfer Assessment/Treatment: sit-stand transfer  Sit-Stand Transfer  Sit-Stand [place] (Transfers): contact guard, minimum assist (75% patient effort), verbal cues (assist with position of RW balance and encouragement)  Assistive Device (Sit-Stand Transfers): walker, front-wheeled  ADL Assessment/Intervention  BADL Assessment/Intervention: grooming, lower body dressing, upper body dressing  Upper Body Dressing Assessment/Training  Comment (Upper Body Dressing): pt declined to change her gown stating her shoulders hurt and she did not want to. Pt's  and OT encouraged to engage in some act and pt agreed to sit EOB and do grooming tasks.   Lower Body Dressing Assessment/Training  Lower Body Dressing [place] Level: dependent (less than 25% patient effort), socks, doff, don  Lower Body Dressing Position: edge of bed sitting  Comment (Lower Body Dressing): to don and doff socks  Grooming Assessment/Training  [place] Level (Grooming): set up, supervision, verbal cues  Grooming Position: edge of bed sitting  Comment (Grooming): pt requried vc and extended time and effort to wash off her face, pt struggles with limited ROM  Motor Skills Assessment/Interventions  Additional Documentation: Balance Interventions (Group), Balance (Group), Motor Coordination Assessment/Training (Group), Functional Endurance Training (Group)  Balance  Balance: static sitting  balance  Static Sitting Balance  Level of [place] (Unsupported Sitting, Static Balance): contact guard assist, supervision, minimal assist, 75% patient effort (varied started min went down to close SBA)  Sitting Position (Unsupported Sitting, Static Balance): sitting on edge of bed        ROM/MMT             Sensory, Edema, Orthotics          Cognition, Communication, Swallow  Cognitive Assessment/Intervention  Additional Documentation: Cognitive Assessment/Intervention (Group)  Cognitive Assessment/Intervention- PT/OT  Affect/Mental Status (Cognitive): agitated  Orientation Status (Cognition): oriented to, person, place (knew year not month, )  Follows Commands (Cognition): 75-90% accuracy, follows one step commands, verbal cues/prompting required, repetition of directions required  Speaking Valve  Pretreatment Heart Rate (beats/min): 81  Pre SpO2 (%): 99  Posttreatment Heart Rate (beats/min): 80  Post SpO2 (%): 99  General Eating/Swallowing Observations  Pre SpO2 (%): 99  Post SpO2 (%): 99    Outcome Summary           OT Rehab Goals     Row Name 03/10/18 0855 03/10/18 0600          Bed Mobility Goal 1 (OT)    Activity/Assistive Device (Bed Mobility Goal 1, OT) bed mobility activities, all  - bed mobility activities, all  -NN     [place] Level/Cues Needed (Bed Mobility Goal 1, OT) supervision required  - supervision required  -NN     Time Frame (Bed Mobility Goal 1, OT) by discharge  - by discharge  -NN     Barriers (Bed Mobility Goal 1, OT) pt required encouragment and has increased pain with movement.   -  --     Progress/Outcomes (Bed Mobility Goal 1, OT) goal ongoing;continuing progress toward goal  - goal ongoing  -NN        Transfer Goal 1 (OT)    Activity/Assistive Device (Transfer Goal 1, OT) transfers, all;walker, rolling;commode, bedside with drop arms  - transfers, all;walker, rolling;commode, bedside with drop arms  -NN     [place] Level/Cues Needed (Transfer Goal 1, OT)  contact guard assist  - contact guard assist  -NN     Time Frame (Transfer Goal 1, OT) by discharge  - by discharge  -NN     Barriers (Transfers Goal 1, OT) pain limits pt, inconsistent with sit to stand.   -  --     Progress/Outcome (Transfer Goal 1, OT) goal ongoing  - goal ongoing  -NN        Problem Specific Goal 1 (OT)    Problem Specific Goal 1 (OT) Pt. will complete dynamic sitting balance with UE support with cond I for 10 min sitting EOB to complete ADLS.  - Pt. will complete dynamic sitting balance with UE support with cond I for 10 min sitting EOB to complete ADLS.  -     Time Frame (Problem Specific Goal 1, OT) by discharge  - by discharge  -     Progress/Outcome (Problem Specific Goal 1, OT) goal ongoing;continuing progress toward goal  - goal ongoing  -NN        Problem Specific Goal 2 (OT)    Problem Specific Goal 2 (OT) Pt. will complete all UB ADL with Min A, LB ADL Mod A, and grooming with set-up/cond I  -BH Pt. will complete all UB ADL with Min A, LB ADL Mod A, and grooming with set-up/cond I  -NN     Time Frame (Problem Specific Goal 2, OT) by discharge  - by discharge  -     Progress/Outcome (Problem Specific Goal 2, OT) goal ongoing;continuing progress toward goal  - goal ongoing  -NN       User Key  (r) = Recorded By, (t) = Taken By, (c) = Cosigned By    Initials Name Provider Type     Germaine Avila, OTR/L Occupational Therapist     Carissa Cobos, OTR/L Occupational Therapist        Occupational Therapy Education     Title: PT OT SLP Therapies (Active)     Topic: Occupational Therapy (Active)     Point: ADL training (Done)     Description: Instruct learner(s) on proper safety adaptation and remediation techniques during self care or transfers.   Instruct in proper use of assistive devices.   Learning Progress Summary     Learner Status Readiness Method Response Comment Documented by    Patient Done Acceptance E VU,NR Educated pt on OT and POC. Educated on safety  with bed mobility, t/f, mobility, and ADL. Encoruagement participation and engagement. Educated to call for assist.  03/10/18 0855    Significant Other Done Acceptance E VU,NR Educated pt on OT and POC. Educated on safety with bed mobility, t/f, mobility, and ADL. Encoruagement participation and engagement. Educated to call for assist.  03/10/18 0855          Point: Precautions (Done)     Description: Instruct learner(s) on prescribed precautions during self-care and functional transfers.   Learning Progress Summary     Learner Status Readiness Method Response Comment Documented by    Patient Done Acceptance E VU,NR Educated pt on OT and POC. Educated on safety with bed mobility, t/f, mobility, and ADL. Encoruagement participation and engagement. Educated to call for assist.  03/10/18 0855     Done Acceptance E VU,NR Pt educated on role of OT and POC. Pt educated on benefit of activity, safety with bed mobility, and to use call light if she needs assistance and not to get up on her own.  03/09/18 1154    Significant Other Done Acceptance E VU,NR Educated pt on OT and POC. Educated on safety with bed mobility, t/f, mobility, and ADL. Encoruagement participation and engagement. Educated to call for assist.  03/10/18 0855     Done Acceptance E VU,NR Pt educated on role of OT and POC. Pt educated on benefit of activity, safety with bed mobility, and to use call light if she needs assistance and not to get up on her own. MR 03/09/18 1154          Point: Body mechanics (Done)     Description: Instruct learner(s) on proper positioning and spine alignment during self-care, functional mobility activities and/or exercises.   Learning Progress Summary     Learner Status Readiness Method Response Comment Documented by    Patient Done Acceptance E VU,NR Educated pt on OT and POC. Educated on safety with bed mobility, t/f, mobility, and ADL. Encoruagement participation and engagement. Educated to call for assist.   03/10/18 0855     Done Acceptance E VU,NR Pt educated on role of OT and POC. Pt educated on benefit of activity, safety with bed mobility, and to use call light if she needs assistance and not to get up on her own. MR 03/09/18 1154    Significant Other Done Acceptance E VU,NR Educated pt on OT and POC. Educated on safety with bed mobility, t/f, mobility, and ADL. Encoruagement participation and engagement. Educated to call for assist.  03/10/18 0855     Done Acceptance E VU,NR Pt educated on role of OT and POC. Pt educated on benefit of activity, safety with bed mobility, and to use call light if she needs assistance and not to get up on her own. MR 03/09/18 1154                      User Key     Initials Effective Dates Name Provider Type Discipline     10/17/16 -  Germaine Avila, OTR/L Occupational Therapist OT    MR 03/07/18 -  Ashley Mccoy, OT Occupational Therapist OT                  OT Recommendation and Plan  Therapy Frequency (OT Eval): other (see comments) (3-14x a week)  Plan of Care Review  Plan of Care Reviewed With: patient, spouse  Plan of Care Reviewed With: patient, spouse  Progress: improving  Outcome Summary: OT treatment completed this date. Pt required encouragement throughout and would get upset easily with movement tasks or challenging tasks. Pt c/o pain with increased movement. Pt averaged need for mod assist with bed mobility more for scooting/positioning. Pt was min assist with sit to stand safely and to side step towards the HOB. Pt could benefit from further skilled OT to increase safety, education, endurance, and independence with ADL. If pt d/c pt will need 24/7 with someone that can lift/assist witht /f or pt may need SNF.        Outcome Measures     Row Name 03/10/18 0855 03/09/18 1040 03/09/18 1039       How much help from another person do you currently need...    Turning from your back to your side while in flat bed without using bedrails?  --  -- 2  -CB    Moving from  lying on back to sitting on the side of a flat bed without bedrails?  --  -- 2  -CB    Moving to and from a bed to a chair (including a wheelchair)?  --  -- 1  -CB    Standing up from a chair using your arms (e.g., wheelchair, bedside chair)?  --  -- 1  -CB    Climbing 3-5 steps with a railing?  --  -- 1  -CB    To walk in hospital room?  --  -- 1  -CB    AM-PAC 6 Clicks Score  --  -- 8  -CB       How much help from another is currently needed...    Putting on and taking off regular lower body clothing? 1  - 1  -MR  --    Bathing (including washing, rinsing, and drying) 2  - 2  -MR  --    Toileting (which includes using toilet bed pan or urinal) 2  - 2  -MR  --    Putting on and taking off regular upper body clothing 2  - 2  -MR  --    Taking care of personal grooming (such as brushing teeth) 3  - 3  -MR  --    Eating meals 3  - 3  -MR  --    Score 13  - 13  -MR  --       Functional Assessment    Outcome Measure Options AM-PAC 6 Clicks Daily Activity (OT)  - AM-PAC 6 Clicks Daily Activity (OT)  - AM-[facility] 6 Clicks Basic Mobility (PT)  -      User Key  (r) = Recorded By, (t) = Taken By, (c) = Cosigned By    Initials Name Provider Type     Melanie Larose, PT Physical Therapist     Germaine Avila, OTR/L Occupational Therapist    MR Ashley Mccoy, OT Occupational Therapist           Time Calculation:         Time Calculation- OT     Row Name 03/10/18 1019             Time Calculation-     OT Start Time 0855  -      OT Stop Time 0930  -      OT Time Calculation (min) 35 min  -      Total Timed Code Minutes- OT 35 minute(s)  -      OT Received On 03/10/18  -        User Key  (r) = Recorded By, (t) = Taken By, (c) = Cosigned By    Initials Name Provider Type     Germaine Avila OTR/L Occupational Therapist           Therapy Charges for Today     Code Description Service Date Service Provider Modifiers Qty    [id number]  OT SELF CARE/MGMT/TRAIN EA 15 MIN 3/10/2018 Germaine Avila  OTR/L GO 1    [id number]  OT THERAPEUTIC ACT EA 15 MIN 3/10/2018 Germaine Avila OTR/L GO 1          OT G-codes  OT Professional Judgement Used?: Yes  OT Functional Scales Options: AM-PAC 6 Clicks Daily Activity (OT)  Score: 13  Functional Limitation: Self care  Self Care Current Status (): At least 60 percent but less than 80 percent impaired, limited or restricted  Self Care Goal Status (): At least 40 percent but less than 60 percent impaired, limited or restricted    Germaine Avila OTR/L  3/10/2018

## 2018-03-10 NOTE — PLAN OF CARE
Problem: Patient Care Overview  Goal: Plan of Care Review  Outcome: Ongoing (interventions implemented as appropriate)   03/10/18 9442   Coping/Psychosocial   Plan of Care Reviewed With patient;spouse   Plan of Care Review   Progress improving   OTHER   Outcome Summary OT treatment completed this date. Pt required encouragement throughout and would get upset easily with movement tasks or challenging tasks. Pt c/o pain with increased movement. Pt averaged need for mod assist with bed mobility more for scooting/positioning. Pt was min assist with sit to stand safely and to side step towards the HOB. Pt could benefit from further skilled OT to increase safety, education, endurance, and independence with ADL. If pt d/c pt will need 24/7 with someone that can lift/assist witht /f or pt may need SNF.

## 2018-03-10 NOTE — PLAN OF CARE
Problem: Patient Care Overview  Goal: Plan of Care Review   03/10/18 0946   Coping/Psychosocial   Plan of Care Reviewed With patient;spouse   Plan of Care Review   Progress improving   OTHER   Outcome Summary Pt required mod A x1 to get EOB and c/o pain in R knee during all bed mobility. Pt having difficulty maintaining head/ neck in neutral position and frequent cues needed for posture. Pt min A to stand with RW and mon/ mod assistance to side step to HOB. O2 stable throughout activity. Pt would benefit from skilled PT to progress towards functional goals. if discharged this date pt will need 24/7 care. and continued rehab recommended.

## 2018-03-11 LAB
ANION GAP SERPL CALCULATED.3IONS-SCNC: 11 MMOL/L (ref 5–15)
BACTERIA SPEC AEROBE CULT: NORMAL
BASOPHILS # BLD AUTO: 0.02 10*3/MM3 (ref 0–0.2)
BASOPHILS NFR BLD AUTO: 0.2 % (ref 0–2)
BUN BLD-MCNC: 60 MG/DL (ref 7–21)
BUN/CREAT SERPL: 28 (ref 7–25)
CALCIUM SPEC-SCNC: 9.9 MG/DL (ref 8.4–10.2)
CHLORIDE SERPL-SCNC: 99 MMOL/L (ref 95–110)
CO2 SERPL-SCNC: 29 MMOL/L (ref 22–31)
CREAT BLD-MCNC: 2.14 MG/DL (ref 0.5–1)
DEPRECATED RDW RBC AUTO: 54.7 FL (ref 36.4–46.3)
EOSINOPHIL # BLD AUTO: 0.32 10*3/MM3 (ref 0–0.7)
EOSINOPHIL NFR BLD AUTO: 2.7 % (ref 0–7)
ERYTHROCYTE [DISTWIDTH] IN BLOOD BY AUTOMATED COUNT: 18.1 % (ref 11.5–14.5)
GFR SERPL CREATININE-BSD FRML MDRD: 22 ML/MIN/1.73 (ref 60–90)
GLUCOSE BLD-MCNC: 156 MG/DL (ref 60–100)
GLUCOSE BLDC GLUCOMTR-MCNC: 170 MG/DL (ref 70–130)
GLUCOSE BLDC GLUCOMTR-MCNC: 246 MG/DL (ref 70–130)
GLUCOSE BLDC GLUCOMTR-MCNC: 252 MG/DL (ref 70–130)
GLUCOSE BLDC GLUCOMTR-MCNC: 260 MG/DL (ref 70–130)
GLUCOSE BLDC GLUCOMTR-MCNC: 267 MG/DL (ref 70–130)
HCT VFR BLD AUTO: 29.1 % (ref 35–45)
HGB BLD-MCNC: 9.3 G/DL (ref 12–15.5)
IMM GRANULOCYTES # BLD: 0.05 10*3/MM3 (ref 0–0.02)
IMM GRANULOCYTES NFR BLD: 0.4 % (ref 0–0.5)
INR PPP: 2.22 (ref 0.8–1.2)
LYMPHOCYTES # BLD AUTO: 1.5 10*3/MM3 (ref 0.6–4.2)
LYMPHOCYTES NFR BLD AUTO: 12.6 % (ref 10–50)
MCH RBC QN AUTO: 26.6 PG (ref 26.5–34)
MCHC RBC AUTO-ENTMCNC: 32 G/DL (ref 31.4–36)
MCV RBC AUTO: 83.1 FL (ref 80–98)
MONOCYTES # BLD AUTO: 0.93 10*3/MM3 (ref 0–0.9)
MONOCYTES NFR BLD AUTO: 7.8 % (ref 0–12)
NEUTROPHILS # BLD AUTO: 9.04 10*3/MM3 (ref 2–8.6)
NEUTROPHILS NFR BLD AUTO: 76.3 % (ref 37–80)
PLATELET # BLD AUTO: 304 10*3/MM3 (ref 150–450)
PMV BLD AUTO: 11.1 FL (ref 8–12)
POTASSIUM BLD-SCNC: 4.3 MMOL/L (ref 3.5–5.1)
PROTHROMBIN TIME: 23.7 SECONDS (ref 11.1–15.3)
RBC # BLD AUTO: 3.5 10*6/MM3 (ref 3.77–5.16)
SODIUM BLD-SCNC: 139 MMOL/L (ref 137–145)
WBC NRBC COR # BLD: 11.86 10*3/MM3 (ref 3.2–9.8)

## 2018-03-11 PROCEDURE — 85610 PROTHROMBIN TIME: CPT | Performed by: HOSPITALIST

## 2018-03-11 PROCEDURE — 82962 GLUCOSE BLOOD TEST: CPT

## 2018-03-11 PROCEDURE — 25010000002 CEFTRIAXONE PER 250 MG: Performed by: HOSPITALIST

## 2018-03-11 PROCEDURE — 63710000001 INSULIN ASPART PER 5 UNITS: Performed by: INTERNAL MEDICINE

## 2018-03-11 PROCEDURE — 80048 BASIC METABOLIC PNL TOTAL CA: CPT | Performed by: HOSPITALIST

## 2018-03-11 PROCEDURE — 85025 COMPLETE CBC W/AUTO DIFF WBC: CPT | Performed by: HOSPITALIST

## 2018-03-11 PROCEDURE — 97110 THERAPEUTIC EXERCISES: CPT

## 2018-03-11 PROCEDURE — 94799 UNLISTED PULMONARY SVC/PX: CPT

## 2018-03-11 RX ADMIN — FUROSEMIDE 80 MG: 80 TABLET ORAL at 18:02

## 2018-03-11 RX ADMIN — POTASSIUM CHLORIDE 40 MEQ: 750 CAPSULE, EXTENDED RELEASE ORAL at 08:35

## 2018-03-11 RX ADMIN — INSULIN ASPART 3 UNITS: 100 INJECTION, SOLUTION INTRAVENOUS; SUBCUTANEOUS at 08:36

## 2018-03-11 RX ADMIN — ACETAMINOPHEN 650 MG: 325 TABLET ORAL at 19:30

## 2018-03-11 RX ADMIN — LEVOTHYROXINE SODIUM 75 MCG: 75 TABLET ORAL at 08:35

## 2018-03-11 RX ADMIN — INSULIN ASPART 8 UNITS: 100 INJECTION, SOLUTION INTRAVENOUS; SUBCUTANEOUS at 11:09

## 2018-03-11 RX ADMIN — ASPIRIN 81 MG: 81 TABLET, COATED ORAL at 08:35

## 2018-03-11 RX ADMIN — CALCITRIOL 0.25 MCG: 0.25 CAPSULE, LIQUID FILLED ORAL at 08:35

## 2018-03-11 RX ADMIN — HYDROCODONE BITARTRATE AND ACETAMINOPHEN 1 TABLET: 7.5; 325 TABLET ORAL at 09:28

## 2018-03-11 RX ADMIN — NYSTATIN: 100000 POWDER TOPICAL at 04:31

## 2018-03-11 RX ADMIN — CLORAZEPATE DIPOTASSIUM 3.75 MG: 3.75 TABLET ORAL at 20:53

## 2018-03-11 RX ADMIN — CARVEDILOL 12.5 MG: 12.5 TABLET, FILM COATED ORAL at 08:36

## 2018-03-11 RX ADMIN — NYSTATIN: 100000 POWDER TOPICAL at 08:38

## 2018-03-11 RX ADMIN — CEFTRIAXONE 1 G: 1 INJECTION, POWDER, FOR SOLUTION INTRAMUSCULAR; INTRAVENOUS at 00:36

## 2018-03-11 RX ADMIN — CLONIDINE HYDROCHLORIDE 0.4 MG: 0.2 TABLET ORAL at 20:53

## 2018-03-11 RX ADMIN — WARFARIN SODIUM 1 MG: 1 TABLET ORAL at 18:03

## 2018-03-11 RX ADMIN — INSULIN ASPART 8 UNITS: 100 INJECTION, SOLUTION INTRAVENOUS; SUBCUTANEOUS at 20:53

## 2018-03-11 RX ADMIN — CARVEDILOL 12.5 MG: 12.5 TABLET, FILM COATED ORAL at 18:03

## 2018-03-11 RX ADMIN — HYDROCODONE BITARTRATE AND ACETAMINOPHEN 1 TABLET: 7.5; 325 TABLET ORAL at 16:28

## 2018-03-11 RX ADMIN — ATORVASTATIN CALCIUM 10 MG: 10 TABLET, FILM COATED ORAL at 08:35

## 2018-03-11 RX ADMIN — INSULIN ASPART 5 UNITS: 100 INJECTION, SOLUTION INTRAVENOUS; SUBCUTANEOUS at 18:03

## 2018-03-11 RX ADMIN — PANTOPRAZOLE SODIUM 40 MG: 40 TABLET, DELAYED RELEASE ORAL at 06:00

## 2018-03-11 RX ADMIN — CLONIDINE HYDROCHLORIDE 0.2 MG: 0.2 TABLET ORAL at 08:35

## 2018-03-11 NOTE — THERAPY TREATMENT NOTE
Acute Care - Physical Therapy Treatment Note  [facility]     Patient Name: Dorcas Bain  : 1940  MRN: [id number]  Today's Date: 3/11/2018     Date of Referral to PT: 18       Admit Date: 3/6/2018    Visit Dx:    ICD-10-CM ICD-9-CM   1. Impaired physical mobility Z74.09 781.99   2. Impaired mobility and activities of daily living Z74.09 799.89     Patient Active Problem List   Diagnosis   • MELODY (acute kidney injury)       Therapy Treatment    Therapy Treatment / Health Promotion    Treatment Time/Intention  Discipline: physical therapy assistant  Document Type: therapy note (daily note)  Subjective Information: complains of, pain  Mode of Treatment: individual therapy, physical therapy  Care Plan Review: care plan/treatment goals reviewed, risks/benefits reviewed, current/potential barriers reviewed, patient/other agree to care plan  Care Plan Review, Other Participant(s): spouse  Total Minutes, Physical Therapy Treatment: 40  Therapy Frequency (PT Clinical Impression): daily  Patient Effort: fair  Equipment Issued to Patient: gait belt  Plan of Care Review  Plan of Care Reviewed With: patient, spouse    Vitals/Pain/Safety  Vital Signs  Pre Systolic BP Rehab: 119  Pre Treatment Diastolic BP: 57  Pretreatment Heart Rate (beats/min): 73  Pre SpO2 (%): 92  O2 Delivery Pre Treatment: nasal cannula  Pre Patient Position: Supine  Intra Patient Position: Standing  Post Patient Position: Supine  Pain Assessment  Additional Documentation: Pain Scale: Numbers Pre/Post-Treatment (Group)  Pain Scale: Numbers Pre/Post-Treatment  Pain Scale: Numbers, Pretreatment: 3/10  Pain Scale: Numbers, Post-Treatment: 3/10  Pain Location - Side: Right  Pain Location: knee  Pain Intervention(s): Medication (See MAR)  Pain Scale: Word Pre/Post-Treatment  Pain Location - Side: Right  Pain Location: knee  Pain Intervention(s): Medication (See MAR)  Pain Scale: FACES Pre/Post-Treatment  Pain Location - Side: Right  Pain  Location: knee  Pain Intervention(s): Medication (See MAR)  Safety Issues, Functional Mobility  Safety Issues Affecting Function (Mobility): ability to follow commands, awareness of need for assistance, safety precautions follow-through/compliance  Impairments Affecting Function (Mobility): balance, postural/trunk control, pain, range of motion (ROM), coordination, endurance/activity tolerance, strength  Positioning and Restraints  Pre-Treatment Position: in bed  Post Treatment Position: bed  In Bed: notified nsg, supine, call light within reach, encouraged to call for assist, exit alarm on, with family/caregiver    Mobility,ADL,Motor, Modality  Bed Mobility Assessment/Treatment  Bed Mobility Assessment/Treatment: supine-sit, sit-supine  Rolling Left [place]ook[place] (Bed Mobility): not tested  Rolling Right Rook[place] (Bed Mobility): not tested  Scooting/Bridging Rooks (Bed Mobility): not tested  Supine-Sit Rooks (Bed Mobility): maximum assist (25% patient effort)  Sit-Supine Rooks (Bed Mobility): maximum assist (25% patient effort)  Bed Mobility, Safety Issues: decreased use of arms for pushing/pulling, decreased use of legs for bridging/pushing  Assistive Device (Bed Mobility): bed rails, draw sheet, head of bed elevated  Transfer Assessment/Treatment  Transfer Assessment/Treatment: sit-stand transfer, stand-sit transfer  Maintains Weight-bearing Status (Transfers): able to maintain  Sit-Stand Transfer  Sit-Stand Rooks (Transfers): moderate assist (50% patient effort), 2 person assist  Assistive Device (Sit-Stand Transfers): walker, front-wheeled  Stand-Sit Transfer  Stand-Sit Rooks (Transfers): moderate assist (50% patient effort), 2 person assist  Assistive Device (Stand-Sit Transfers): walker, front-wheeled  Gait/Stairs Assessment/Training  Rooks Level (Gait): not tested  R[place]s Level (Stairs): not tested  Rooks Level (Ramp): not tested         Balance  Balance: static sitting balance  Static Sitting Balance  Level of [place] (Unsupported Sitting, Static Balance): supervision  Sitting Position (Unsupported Sitting, Static Balance): sitting on edge of bed  Time Able to Maintain Position (Unsupported Sitting, Static Balance): more than 5 minutes        ROM/MMT     General Assessment (Manual Muscle Testing)  General Manual Muscle Testing (MMT) Assessment: upper extremity strength deficits identified, lower extremity strength deficits identified       Sensory, Edema, Orthotics          Cognition, Communication, Swallow  Cognitive Assessment/Intervention- PT/OT  Affect/Mental Status (Cognitive): agitated  Behavioral Issues (Cognitive): uncooperative  Orientation Status (Cognition): oriented x 4  Follows Commands (Cognition): 50-74% accuracy  Speaking Valve  Pretreatment Heart Rate (beats/min): 73  Pre SpO2 (%): 92  General Eating/Swallowing Observations  Pre SpO2 (%): 92  Clinical Impression  Equipment Issued to Patient: gait belt    Outcome Summary  Outcome Summary/Treatment Plan (PT)  Daily Summary of Progress (PT): unable to show any progress toward functional goals  Plan for Continued Treatment (PT): OOB  Anticipated Discharge Disposition (PT): skilled nursing facility (SNF)            PT Rehab Goals     Row Name 03/11/18 0845 03/10/18 1300          Bed Mobility Goal 1 (PT)    Activity/Assistive Device (Bed Mobility Goal 1, PT) sit to supine;supine to sit  -AM sit to supine/supine to sit  -SM     [place] Level/Cues Needed (Bed Mobility Goal 1, PT) contact guard assist  -AM contact guard assist  -SM     Time Frame (Bed Mobility Goal 1, PT) 2 days;3 days  -AM 2 days;3 days  -SM     Barriers (Bed Mobility Goal 1, PT) HOB down and no rails; head propped w/multiple pillows  -AM HOB down and no rails, head propped with multiple pillows  -SM     Progress/Outcomes (Bed Mobility Goal 1, PT) goal ongoing  -AM goal ongoing  -SM        Transfer Goal 1 (PT)     Activity/Assistive Device (Transfer Goal 1, PT) sit-to-stand/stand-to-sit;bed-to-chair/chair-to-bed;walker, rolling;cane, straight  -AM bed-to-chair/chair-to-bed;sit-to-stand/stand-to-sit;walker, rolling;cane, straight  -SM     [place] Level/Cues Needed (Transfer Goal 1, PT) minimum assist (75% or more patient effort)  -AM minimum assist (75% or more patient effort)  -SM     Time Frame (Transfer Goal 1, PT) 2 - 3 days  -AM 2 - 3 days  -SM     Progress/Outcome (Transfer Goal 1, PT) goal ongoing  -AM goal ongoing  -SM        Gait Training Goal 1 (PT)    Activity/Assistive Device (Gait Training Goal 1, PT) gait (walking locomotion);walker, rolling;cane, straight  -AM gait (walking locomotion);walker, rolling;cane, straight  -SM     [place] Level (Gait Training Goal 1, PT) contact guard assist  -AM contact guard assist  -SM     Distance (Gait Goal 1, PT) 100  -'  -SM     Time Frame (Gait Training Goal 1, PT) by discharge  -AM by discharge  -SM     Progress/Outcome (Gait Training Goal 1, PT) goal ongoing  -AM goal ongoing  -SM        Strength Goal 1 (PT)    Strength Goal 1 (PT) 20 reps all ex BLE AROM. For pt to be able to get BLE up onto bed Ind  -AM 20 reps all ex BLE AROM, For pt to be able to get BLE up onto bed  Independently   -SM     Time Frame (Strength Goal 1, PT) 2 days;3 days  -AM 2 days;3 days  -SM     Progress/Outcome (Strength Goal 1, PT) goal ongoing  -AM goal ongoing  -SM       User Key  (r) = Recorded By, (t) = Taken By, (c) = Cosigned By    Initials Name Provider Type    AM Imer Diaz PTA Physical Therapy Assistant     Merna Mehta PTA Physical Therapy Assistant          Physical Therapy Education     Title: PT OT SLP Therapies (Active)     Topic: Physical Therapy (Active)     Point: Mobility training (Active)    Learning Progress Summary     Learner Status Readiness Method Response Comment Documented by    Patient Active Acceptance E,TB,D NR pt and spouse educated on  safety and use of gait belt. Spouse able to demonstrate proper use of belt to assist pt at home  03/10/18 1334     Active Acceptance D NR   03/09/18 1157    Family Active Acceptance D NR   03/09/18 1157    Significant Other Active Acceptance E,TB,D NR pt and spouse educated on safety and use of gait belt. Spouse able to demonstrate proper use of belt to assist pt at home  03/10/18 1334          Point: Precautions (Active)    Learning Progress Summary     Learner Status Readiness Method Response Comment Documented by    Patient Active Acceptance D NR   03/09/18 1157    Family Active Acceptance D NR   03/09/18 1157                      User Key     Initials Effective Dates Name Provider Type Discipline     04/06/17 -  Melanie Larose, PT Physical Therapist PT     03/07/18 -  Merna Mehta, PTA Physical Therapy Assistant PT                    PT Recommendation and Plan  Anticipated Discharge Disposition (PT): skilled nursing facility (SNF)  Therapy Frequency (PT Clinical Impression): daily  Daily Summary of Progress (PT): unable to show any progress toward functional goals  Plan for Continued Treatment (PT): OOB  Plan of Care Reviewed With: patient, spouse  Outcome Summary: Pt did not meet any PT goals this tx. Pt very upset about being here at hospital and upset with . Pt needed Max A to get EOB. Pt unable to stand on first attempt w/shoe boots on. Pt able to stand w/Max A 2 w/socks. Pt unable to take any steps. Pt declined stand pivot to recliner. Pt needs SNF placement.           Outcome Measures     Row Name 03/11/18 0845 03/10/18 0855 03/09/18 1040       How much help from another person do you currently need...    Turning from your back to your side while in flat bed without using bedrails? 2  -AM  --  --    Moving from lying on back to sitting on the side of a flat bed without bedrails? 2  -AM  --  --    Moving to and from a bed to a chair (including a wheelchair)? 1  -AM  --  --     Standing up from a chair using your arms (e.g., wheelchair, bedside chair)? 2  -AM  --  --    Climbing 3-5 steps with a railing? 1  -AM  --  --    To walk in hospital room? 1  -AM  --  --    AM-PAC 6 Clicks Score 9  -AM  --  --       How much help from another is currently needed...    Putting on and taking off regular lower body clothing?  -- 1  - 1  -MR    Bathing (including washing, rinsing, and drying)  -- 2  - 2  -MR    Toileting (which includes using toilet bed pan or urinal)  -- 2  - 2  -MR    Putting on and taking off regular upper body clothing  -- 2  - 2  -MR    Taking care of personal grooming (such as brushing teeth)  -- 3  - 3  -MR    Eating meals  -- 3  - 3  -MR    Score  -- 13  - 13  -MR       Functional Assessment    Outcome Measure Options AM-PAC 6 Clicks Basic Mobility (PT)  -AM AM-PAC 6 Clicks Daily Activity (OT)  - AM-PAC 6 Clicks Daily Activity (OT)  -MR    Row Name 03/09/18 1039             How much help from another person do you currently need...    Turning from your back to your side while in flat bed without using bedrails? 2  -CB      Moving from lying on back to sitting on the side of a flat bed without bedrails? 2  -CB      Moving to and from a bed to a chair (including a wheelchair)? 1  -CB      Standing up from a chair using your arms (e.g., wheelchair, bedside chair)? 1  -CB      Climbing 3-5 steps with a railing? 1  -CB      To walk in hospital room? 1  -CB      AM-PAC 6 Clicks Score 8  -CB         Functional Assessment    Outcome Measure Options AM-PAC 6 Clicks Basic Mobility (PT)  -CB        User Key  (r) = Recorded By, (t) = Taken By, (c) = Cosigned By    Initials Name Provider Type    CB Melanie Larose, PT Physical Therapist     Germaine Avila, OTR/L Occupational Therapist    AM Imer Diaz, PTA Physical Therapy Assistant    MR Ashley Mccoy, OT Occupational Therapist           Time Calculation:         PT Charges     Row Name 03/11/18 8738              Time Calculation    Start Time 0845  -AM      Stop Time 0925  -AM      Time Calculation (min) 40 min  -AM      PT Received On 03/11/18  -AM      PT - Next Appointment 03/12/18  -AM         Time Calculation- PT    Total Timed Code Minutes- PT 40 minute(s)  -AM        User Key  (r) = Recorded By, (t) = Taken By, (c) = Cosigned By    Initials Name Provider Type    AM Imer Diaz PTA Physical Therapy Assistant          Therapy Charges for Today     Code Description Service Date Service Provider Modifiers Qty    [id number] HC PT THER PROC EA 15 MIN 3/11/2018 Imer Diaz PTA GP 3          PT G-Codes  PT Professional Judgement Used?: Yes  Outcome Measure Options: AM-PAC 6 Clicks Basic Mobility (PT)  Score: 8  Functional Limitation: Mobility: Walking and moving around  Mobility: Walking and Moving Around Current Status (): At least 80 percent but less than 100 percent impaired, limited or restricted  Mobility: Walking and Moving Around Goal Status (): At least 60 percent but less than 80 percent impaired, limited or restricted    Imer Diaz PTA  3/11/2018

## 2018-03-11 NOTE — PLAN OF CARE
Problem: Patient Care Overview  Goal: Plan of Care Review  Outcome: Ongoing (interventions implemented as appropriate)   03/11/18 6044   Coping/Psychosocial   Plan of Care Reviewed With patient;spouse   OTHER   Outcome Summary Pt did not meet any PT goals this tx. Pt very upset about being here at hospital and upset with . Pt needed Max A to get EOB. Pt unable to stand on first attempt w/shoe boots on. Pt able to stand w/Max A 2 w/socks. Pt unable to take any steps. Pt declined stand pivot to recliner. Pt needs SNF placement.

## 2018-03-11 NOTE — PROGRESS NOTES
[facility] Medicine Services  INPATIENT PROGRESS NOTE    Length of Stay: 5  Date of Admission: 3/6/2018  Primary Care Physician: Markell Muller MD    Subjective   Please note that all previous progress notes, radiographical findings, lab findings, medication changes, and physical exam findings have been noted and updated as appropriate.    3/11/2018: Patient is medically stable.  Creatinine near baseline.  Electrolytes within normal limits.  White blood cell count trending down.  The main issue is that patient is extremely weak, physically deconditioned, and unable to care for herself.  Patient  also notes that he is unable to care for her.  At this time patient is hostile to the idea of continued care and demonstrates even possibly paranoid tendencies.  Patient believes that her  is attempting to still all of her money, home, and auto.  Prolonged discussion had with patient along with physical therapy explaining that the patient is unsafe at this time and unable to perform any actions without maximum assist.  Will discuss with case management.    3/10/2018: Patient continues to feel better, continues to have no acute complaints.  Patient growing both Klebsiella and Proteus, both sensitive to ceftriaxone which the patient is on.  Creatinine continues to improve.  White count did trend upward, will continue to monitor.  No shortness of air, fever, chills, nausea, vomiting.  No chest pain.    3/9/2018: Patient has no acute complaints.  Did have a fever overnight, urinalysis demonstrating gram-negative bacilli and Klebsiella, sensitivity so far shows ceftriaxone as an appropriate drug.  Patient is on ceftriaxone.  Denies shortness of air, nausea, vomiting.  Repeat blood cultures pending.  Creatinine near baseline per nephrology.  Consult appreciated.    Chief Complaint/HPI:  This 77 year old  female was emitted to Hospital services secondary  to multiple medical comorbidities.  Patient has a history of stage IV chronic kidney disease, congestive heart failure, hypertension, diabetes mellitus type 2, anemia of chronic kidney disease.  Patient was recently started on Entresto by her cardiologist.  Patient began to experience increased fatigue and weakness, itching, medication was discontinued.  At that time patient complained of increased edema of the lower extremities and patient was treated with double doses of Lasix.  Patient was taking Lasix 80 mg by mouth twice a day, meaning that this was the dose was doubled.  At the time of admission creatinine had risen to 4.34.    Patient also demonstrates gram negative bacilli in the urine.  This is likely Escherichia coli.  Currently on ceftriaxone.  At this time will order echocardiogram to further evaluate valvular function.  We will order PT and OT secondary to deconditioning.  Creatinine improving, patient denies any complaints at this time.    Review of Systems   Constitutional: Negative for chills and fever.   Respiratory: Negative for shortness of breath.    Cardiovascular: Negative for chest pain.   Gastrointestinal: Negative for abdominal pain, nausea and vomiting.   Neurological: Negative for dizziness and light-headedness.      All pertinent negatives and positives are as above. All other systems have been reviewed and are negative unless otherwise stated.     Objective    Temp:  [96.9 °F (36.1 °C)-98.7 °F (37.1 °C)] 96.9 °F (36.1 °C)  Heart Rate:  [62-76] 73  Resp:  [18-19] 18  BP: (119-140)/(56-63) 119/57    Physical Exam   Constitutional: She appears well-developed and well-nourished.   HENT:   Head: Normocephalic and atraumatic.   Cardiovascular: Normal rate and regular rhythm.    Pulmonary/Chest: Effort normal. No respiratory distress.   Abdominal: Soft. Bowel sounds are normal. She exhibits no distension. There is no tenderness.   Neurological: She is alert.   Skin: Skin is warm and dry.      Results Review:  I have reviewed the labs, radiology results, and diagnostic studies.    Laboratory Data:     Results from last 7 days  Lab Units 03/11/18  0612 03/10/18  0604 03/09/18  0809  03/06/18  2332   SODIUM mmol/L 139 143 141  < > 141   POTASSIUM mmol/L 4.3 3.3* 4.2  < > 3.3*   CHLORIDE mmol/L 99 102 104  < > 95   CO2 mmol/L 29.0 26.0 23.0  < > 24.0   BUN mg/dL 60* 61* 71*  < > 96*   CREATININE mg/dL 2.14* 2.08* 2.57*  < > 4.34*   GLUCOSE mg/dL 156* 134* 206*  < > 212*   CALCIUM mg/dL 9.9 9.9 10.3*  < > 9.6   BILIRUBIN mg/dL  --   --   --   --  0.6   ALK PHOS U/L  --   --   --   --  83   ALT (SGPT) U/L  --   --   --   --  29   AST (SGOT) U/L  --   --   --   --  16   ANION GAP mmol/L 11.0 15.0 14.0  < > 22.0*   < > = values in this interval not displayed.  Estimated Creatinine Clearance: 20.7 mL/min (by C-G formula based on SCr of 2.14 mg/dL (H)).            Results from last 7 days  Lab Units 03/11/18  0612 03/10/18  0604 03/08/18  0734 03/07/18  0149 03/06/18  2332   WBC 10*3/mm3 11.86* 14.54* 12.99* 10.46* 11.17*   HEMOGLOBIN g/dL 9.3* 9.5* 11.0* 10.8* 11.5*   HEMATOCRIT % 29.1* 29.6* 33.0* 32.3* 34.2*   PLATELETS 10*3/mm3 304 282 320 290 323       Results from last 7 days  Lab Units 03/11/18  0612 03/10/18  0604 03/09/18  0809 03/08/18  0734 03/07/18  0444   INR  2.22* 1.98* 2.04* 2.05* 2.19*       Culture Data:   Blood Culture   Date Value Ref Range Status   03/09/2018 No growth at 2 days  Preliminary   03/09/2018 No growth at 2 days  Preliminary     Urine Culture   Date Value Ref Range Status   03/10/2018 Culture in progress  Preliminary     No results found for: RESPCX  No results found for: WOUNDCX  No results found for: STOOLCX  No components found for: BODYFLD    Radiology Data:   Imaging Results (last 24 hours)     ** No results found for the last 24 hours. **          I have reviewed the patient current medications.     Assessment/Plan     Hospital Problem List     MELODY (acute kidney injury)           Plan:  Work with case management for discharge planning as appropriate.  Patient most appropriate for SNF.                This document has been electronically signed by SAMRA Mulligan on March 11, 2018 10:19 AM

## 2018-03-11 NOTE — PROGRESS NOTES
"Anticoagulation by Pharmacy - Warfarin    Dorcas Bain is a 77 y.o.female  [Ht: 157.5 cm (62\"); Wt: 58.5 kg (129 lb)] on Warfarin alternating doses of 1 mg daily except 2.5 mg on Mon and Thu PO  for indication of a fib.    Goal INR: 2-3  Today's INR:   Lab Results   Component Value Date    INR 2.22 (H) 03/11/2018         Lab Results   Component Value Date    INR 2.22 (H) 03/11/2018    INR 1.98 (H) 03/10/2018    INR 2.04 (H) 03/09/2018    PROTIME 23.7 (H) 03/11/2018    PROTIME 21.7 (H) 03/10/2018    PROTIME 22.2 (H) 03/09/2018     Lab Results   Component Value Date    HGB 9.3 (L) 03/11/2018    HGB 9.5 (L) 03/10/2018    HGB 11.0 (L) 03/08/2018     Lab Results   Component Value Date    HCT 29.1 (L) 03/11/2018    HCT 29.6 (L) 03/10/2018    HCT 33.0 (L) 03/08/2018     Lab Results   Component Value Date     03/11/2018     03/10/2018     03/08/2018     Assessment/Plan:  Reviewed above labs. INR is 2.22.  INR is at goal. No changes in medication list. Concurrent medications include aspirin and levothyroxine. Pt did receive dose of warfarin last night.  Will continue current dose of  warfarin as listed above. Rx will continue to follow and adjust dose accordingly.      Shilpa Hilliard, [facility]  03/11/18 12:30 PM     "

## 2018-03-11 NOTE — PLAN OF CARE
Problem: Fall Risk (Adult)  Goal: Identify Related Risk Factors and Signs and Symptoms  Outcome: Ongoing (interventions implemented as appropriate)    Goal: Identify Related Risk Factors and Signs and Symptoms  Outcome: Ongoing (interventions implemented as appropriate)    Goal: Absence of Fall  Outcome: Ongoing (interventions implemented as appropriate)      Problem: Patient Care Overview  Goal: Plan of Care Review  Outcome: Ongoing (interventions implemented as appropriate)   03/11/18 0417   Coping/Psychosocial   Plan of Care Reviewed With patient       Problem: Pain, Chronic (Adult)  Goal: Acceptable Pain/Comfort Level and Functional Ability  Outcome: Ongoing (interventions implemented as appropriate)

## 2018-03-11 NOTE — PROGRESS NOTES
Nephrology Progress Note:    States she is feeling better.  No shortness of breath.  Upset about care.  Has been unable to ambulate, probably needs further strengthening, rehabilitation, and feels her  not helping her.  Edema of the lower extremities is improved.  Patient denies shortness of breath    Patient Active Problem List   Diagnosis   • MELODY (acute kidney injury)       Medications:    aspirin 81 mg Oral Daily   atorvastatin 10 mg Oral Daily   calcitriol 0.25 mcg Oral Daily   carvedilol 12.5 mg Oral BID With Meals   ceftriaxone 1 g Intravenous Q24H   CloNIDine 0.2 mg Oral Daily   CloNIDine 0.4 mg Oral Nightly   clorazepate 3.75 mg Oral Nightly   furosemide 80 mg Oral BID   insulin aspart 0-14 Units Subcutaneous 4x Daily AC & at Bedtime   levothyroxine 75 mcg Oral Daily   nystatin  Topical Q12H   pantoprazole 40 mg Oral QAM   warfarin 1 mg Oral Once per day on Sun Tue Wed Fri Sat   warfarin 2.5 mg Oral Once per day on Mon Thu       Pharmacy to dose warfarin      Vitals:    03/11/18 0332 03/11/18 0517 03/11/18 0747 03/11/18 0833   BP:  119/56  119/57   BP Location:    Left arm   Patient Position:    Lying   Pulse: 68 62 73 73   Resp:  19  18   Temp:  98.5 °F (36.9 °C)  96.9 °F (36.1 °C)   TempSrc:  Oral  Oral   SpO2:  98%  92%   Weight:       Height:         I/O last 3 completed shifts:  In: 580 [P.O.:480; IV Piggyback:100]  Out: 2650 [Urine:2650]  No intake/output data recorded.    On examination:  General:  Alert and comfortable, multiple complaints regarding care, no distress  HEENT: Pallor present, no icterus, eye movements are normal  Neck: No JVP or thyroid enlargement  Chest: Coarse breath sounds at the lung bases.  Air entry appears equal bilaterally   CVS: Heart sounds are irregular.  No pericardial rub or gallop  Abdomen: Distended, soft, normal bowel sounds, no organomegaly.  No rebound or guarding  Extremities: Trace edema of the lower extremities, much improved  Neuro: Grade 4 power upper and  lower extremities.  No asterixis  Mentation: Alert and oriented    Past medical illness, social history, medications, previous notes reviewed.       Laboratory results:      Recent Results (from the past 24 hour(s))   POC Glucose Once    Collection Time: 03/10/18  4:43 PM   Result Value Ref Range    Glucose 229 (H) 70 - 130 mg/dL   POC Glucose Once    Collection Time: 03/10/18  9:06 PM   Result Value Ref Range    Glucose 284 (H) 70 - 130 mg/dL   Basic Metabolic Panel    Collection Time: 03/11/18  6:12 AM   Result Value Ref Range    Glucose 156 (H) 60 - 100 mg/dL    BUN 60 (H) 7 - 21 mg/dL    Creatinine 2.14 (H) 0.50 - 1.00 mg/dL    Sodium 139 137 - 145 mmol/L    Potassium 4.3 3.5 - 5.1 mmol/L    Chloride 99 95 - 110 mmol/L    CO2 29.0 22.0 - 31.0 mmol/L    Calcium 9.9 8.4 - 10.2 mg/dL    eGFR Non African Amer 22 (L) >60 mL/min/1.73    BUN/Creatinine Ratio 28.0 (H) 7.0 - 25.0    Anion Gap 11.0 5.0 - 15.0 mmol/L   Protime-INR    Collection Time: 03/11/18  6:12 AM   Result Value Ref Range    Protime 23.7 (H) 11.1 - 15.3 Seconds    INR 2.22 (H) 0.80 - 1.20   CBC Auto Differential    Collection Time: 03/11/18  6:12 AM   Result Value Ref Range    WBC 11.86 (H) 3.20 - 9.80 10*3/mm3    RBC 3.50 (L) 3.77 - 5.16 10*6/mm3    Hemoglobin 9.3 (L) 12.0 - 15.5 g/dL    Hematocrit 29.1 (L) 35.0 - 45.0 %    MCV 83.1 80.0 - 98.0 fL    MCH 26.6 26.5 - 34.0 pg    MCHC 32.0 31.4 - 36.0 g/dL    RDW 18.1 (H) 11.5 - 14.5 %    RDW-SD 54.7 (H) 36.4 - 46.3 fl    MPV 11.1 8.0 - 12.0 fL    Platelets 304 150 - 450 10*3/mm3    Neutrophil % 76.3 37.0 - 80.0 %    Lymphocyte % 12.6 10.0 - 50.0 %    Monocyte % 7.8 0.0 - 12.0 %    Eosinophil % 2.7 0.0 - 7.0 %    Basophil % 0.2 0.0 - 2.0 %    Immature Grans % 0.4 0.0 - 0.5 %    Neutrophils, Absolute 9.04 (H) 2.00 - 8.60 10*3/mm3    Lymphocytes, Absolute 1.50 0.60 - 4.20 10*3/mm3    Monocytes, Absolute 0.93 (H) 0.00 - 0.90 10*3/mm3    Eosinophils, Absolute 0.32 0.00 - 0.70 10*3/mm3    Basophils, Absolute  0.02 0.00 - 0.20 10*3/mm3    Immature Grans, Absolute 0.05 (H) 0.00 - 0.02 10*3/mm3   POC Glucose Once    Collection Time: 03/11/18  7:17 AM   Result Value Ref Range    Glucose 170 (H) 70 - 130 mg/dL   ]    Imaging Results (last 24 hours)     ** No results found for the last 24 hours. **            Assessment:     Acute renal failure  Stage IV chronic kidney disease, baseline serum creatinine around 2.5  Congestive heart failure, stable volume status  History of arthralgia, gout  Urinary tract infection  Anemia  Type 2 diabetes mellitus  Secondary hyperparathyroidism  History of GI bleeding in the pastfever, being evaluated       Plan:     Acute renal failure, stage IV chronic kidney disease.  Baseline serum creatinine of 2.4-2.8.  Renal functions are stable.  Mild increase in creatinine after restarting diuretics.  Volume status is improved.  Serum potassium and calcium levels are stable.  Good urine output    History of congestive heart failure and edema.    Volume status is improved.  No shortness of breath.  Continued Lasix 80 mg by mouth twice a day.  Patient received IV diuretics in the last 2 days    Hypertension: Blood pressure readings are stable.  Currently on carvedilol, clonidine, diuretics.  Not on ACE inhibitor's or ARB at this time.    Urinary tract infection: On Rocephin.  Urine cultures with Klebsiella and Proteus, sensitive to ceftriaxone.  WBC is improved to 11.8.      Type 2 diabetes: Hemoglobin A1c was 8.8.  Patient is on insulin.  Not on metformin.  Has proteinuria.    History of arthralgia and gout: No NSAIDs.    Hypokalemia:    Serum potassium is 4.3.  Patient received potassium supplements in the last 2 days.    Secondary hyperparathyroidism: Patient is on calcitriol.  Serum calcium is 9.9    Discussed with patient and  in detail.  Planned evaluation for rehabilitation and strengthening    Devon Downing MD

## 2018-03-11 NOTE — NURSING NOTE
Pt has shown increased confusion and has been uncooperative with taking medication. After repeated education and explanation she did agree to to take her oral meds and Rocephin.

## 2018-03-12 ENCOUNTER — APPOINTMENT (OUTPATIENT)
Dept: ULTRASOUND IMAGING | Facility: HOSPITAL | Age: 78
End: 2018-03-12

## 2018-03-12 ENCOUNTER — APPOINTMENT (OUTPATIENT)
Dept: GENERAL RADIOLOGY | Facility: HOSPITAL | Age: 78
End: 2018-03-12

## 2018-03-12 ENCOUNTER — APPOINTMENT (OUTPATIENT)
Dept: INTERVENTIONAL RADIOLOGY/VASCULAR | Facility: HOSPITAL | Age: 78
End: 2018-03-12

## 2018-03-12 LAB
ANION GAP SERPL CALCULATED.3IONS-SCNC: 11 MMOL/L (ref 5–15)
BASOPHILS # BLD AUTO: 0.04 10*3/MM3 (ref 0–0.2)
BASOPHILS NFR BLD AUTO: 0.4 % (ref 0–2)
BH CV ECHO MEAS - ACS: 1.9 CM
BH CV ECHO MEAS - AO ISTHMUS: 2.4 CM
BH CV ECHO MEAS - AO MAX PG (FULL): 2.7 MMHG
BH CV ECHO MEAS - AO MAX PG: 6.2 MMHG
BH CV ECHO MEAS - AO MEAN PG (FULL): 1 MMHG
BH CV ECHO MEAS - AO MEAN PG: 3 MMHG
BH CV ECHO MEAS - AO ROOT AREA (BSA CORRECTED): 2.1
BH CV ECHO MEAS - AO ROOT AREA: 9.1 CM^2
BH CV ECHO MEAS - AO ROOT DIAM: 3.4 CM
BH CV ECHO MEAS - AO V2 MAX: 124 CM/SEC
BH CV ECHO MEAS - AO V2 MEAN: 83.2 CM/SEC
BH CV ECHO MEAS - AO V2 VTI: 24.8 CM
BH CV ECHO MEAS - ASC AORTA: 2.8 CM
BH CV ECHO MEAS - AVA(I,A): 3 CM^2
BH CV ECHO MEAS - AVA(I,D): 3 CM^2
BH CV ECHO MEAS - AVA(V,A): 2.4 CM^2
BH CV ECHO MEAS - AVA(V,D): 2.4 CM^2
BH CV ECHO MEAS - BSA(HAYCOCK): 1.6 M^2
BH CV ECHO MEAS - BSA: 1.6 M^2
BH CV ECHO MEAS - BZI_BMI: 23.6 KILOGRAMS/M^2
BH CV ECHO MEAS - BZI_METRIC_HEIGHT: 157.5 CM
BH CV ECHO MEAS - BZI_METRIC_WEIGHT: 58.5 KG
BH CV ECHO MEAS - EDV(CUBED): 97.3 ML
BH CV ECHO MEAS - EDV(TEICH): 97.3 ML
BH CV ECHO MEAS - EF(CUBED): 74.9 %
BH CV ECHO MEAS - EF(TEICH): 66.9 %
BH CV ECHO MEAS - ESV(CUBED): 24.4 ML
BH CV ECHO MEAS - ESV(TEICH): 32.2 ML
BH CV ECHO MEAS - FS: 37 %
BH CV ECHO MEAS - IVS/LVPW: 1
BH CV ECHO MEAS - IVSD: 1.1 CM
BH CV ECHO MEAS - LA DIMENSION: 3.8 CM
BH CV ECHO MEAS - LA/AO: 1.1
BH CV ECHO MEAS - LV MASS(C)D: 181.2 GRAMS
BH CV ECHO MEAS - LV MASS(C)DI: 114.2 GRAMS/M^2
BH CV ECHO MEAS - LV MAX PG: 3.4 MMHG
BH CV ECHO MEAS - LV MEAN PG: 2 MMHG
BH CV ECHO MEAS - LV V1 MAX: 92.8 CM/SEC
BH CV ECHO MEAS - LV V1 MEAN: 62 CM/SEC
BH CV ECHO MEAS - LV V1 VTI: 23.8 CM
BH CV ECHO MEAS - LVIDD: 4.6 CM
BH CV ECHO MEAS - LVIDS: 2.9 CM
BH CV ECHO MEAS - LVOT AREA (M): 3.1 CM^2
BH CV ECHO MEAS - LVOT AREA: 3.1 CM^2
BH CV ECHO MEAS - LVOT DIAM: 2 CM
BH CV ECHO MEAS - LVPWD: 1.1 CM
BH CV ECHO MEAS - MV A MAX VEL: 129 CM/SEC
BH CV ECHO MEAS - MV DEC SLOPE: 721 CM/SEC^2
BH CV ECHO MEAS - MV E MAX VEL: 90.3 CM/SEC
BH CV ECHO MEAS - MV E/A: 0.7
BH CV ECHO MEAS - MV MAX PG: 7.5 MMHG
BH CV ECHO MEAS - MV MEAN PG: 3 MMHG
BH CV ECHO MEAS - MV P1/2T MAX VEL: 104 CM/SEC
BH CV ECHO MEAS - MV P1/2T: 42.2 MSEC
BH CV ECHO MEAS - MV V2 MAX: 137 CM/SEC
BH CV ECHO MEAS - MV V2 MEAN: 74.9 CM/SEC
BH CV ECHO MEAS - MV V2 VTI: 25.9 CM
BH CV ECHO MEAS - MVA P1/2T LCG: 2.1 CM^2
BH CV ECHO MEAS - MVA(P1/2T): 5.2 CM^2
BH CV ECHO MEAS - MVA(VTI): 2.9 CM^2
BH CV ECHO MEAS - PA MAX PG: 3.7 MMHG
BH CV ECHO MEAS - PA V2 MAX: 96.4 CM/SEC
BH CV ECHO MEAS - PI END-D VEL: 158 CM/SEC
BH CV ECHO MEAS - RAP SYSTOLE: 5 MMHG
BH CV ECHO MEAS - RVDD: 2.4 CM
BH CV ECHO MEAS - RVSP: 62 MMHG
BH CV ECHO MEAS - SI(AO): 141.9 ML/M^2
BH CV ECHO MEAS - SI(CUBED): 46 ML/M^2
BH CV ECHO MEAS - SI(LVOT): 47.1 ML/M^2
BH CV ECHO MEAS - SI(TEICH): 41 ML/M^2
BH CV ECHO MEAS - SV(AO): 225.2 ML
BH CV ECHO MEAS - SV(CUBED): 72.9 ML
BH CV ECHO MEAS - SV(LVOT): 74.8 ML
BH CV ECHO MEAS - SV(TEICH): 65.1 ML
BH CV ECHO MEAS - TR MAX VEL: 340 CM/SEC
BUN BLD-MCNC: 59 MG/DL (ref 7–21)
BUN/CREAT SERPL: 31.7 (ref 7–25)
CALCIUM SPEC-SCNC: 10.3 MG/DL (ref 8.4–10.2)
CHLORIDE SERPL-SCNC: 102 MMOL/L (ref 95–110)
CO2 SERPL-SCNC: 26 MMOL/L (ref 22–31)
CREAT BLD-MCNC: 1.86 MG/DL (ref 0.5–1)
DEPRECATED RDW RBC AUTO: 57.3 FL (ref 36.4–46.3)
EOSINOPHIL # BLD AUTO: 0.39 10*3/MM3 (ref 0–0.7)
EOSINOPHIL NFR BLD AUTO: 3.5 % (ref 0–7)
ERYTHROCYTE [DISTWIDTH] IN BLOOD BY AUTOMATED COUNT: 18.5 % (ref 11.5–14.5)
GFR SERPL CREATININE-BSD FRML MDRD: 26 ML/MIN/1.73 (ref 39–90)
GLUCOSE BLD-MCNC: 157 MG/DL (ref 60–100)
GLUCOSE BLDC GLUCOMTR-MCNC: 172 MG/DL (ref 70–130)
GLUCOSE BLDC GLUCOMTR-MCNC: 244 MG/DL (ref 70–130)
GLUCOSE BLDC GLUCOMTR-MCNC: 256 MG/DL (ref 70–130)
GLUCOSE BLDC GLUCOMTR-MCNC: 326 MG/DL (ref 70–130)
HCT VFR BLD AUTO: 29.8 % (ref 35–45)
HGB BLD-MCNC: 9.5 G/DL (ref 12–15.5)
IMM GRANULOCYTES # BLD: 0.03 10*3/MM3 (ref 0–0.02)
IMM GRANULOCYTES NFR BLD: 0.3 % (ref 0–0.5)
INR PPP: 1.89 (ref 0.8–1.2)
LV EF 2D ECHO EST: 45 %
LYMPHOCYTES # BLD AUTO: 1.29 10*3/MM3 (ref 0.6–4.2)
LYMPHOCYTES NFR BLD AUTO: 11.6 % (ref 10–50)
MAXIMAL PREDICTED HEART RATE: 144 BPM
MCH RBC QN AUTO: 26.7 PG (ref 26.5–34)
MCHC RBC AUTO-ENTMCNC: 31.9 G/DL (ref 31.4–36)
MCV RBC AUTO: 83.7 FL (ref 80–98)
MONOCYTES # BLD AUTO: 0.9 10*3/MM3 (ref 0–0.9)
MONOCYTES NFR BLD AUTO: 8.1 % (ref 0–12)
NEUTROPHILS # BLD AUTO: 8.47 10*3/MM3 (ref 2–8.6)
NEUTROPHILS NFR BLD AUTO: 76.1 % (ref 37–80)
NRBC BLD MANUAL-RTO: 0 /100 WBC (ref 0–0)
PLATELET # BLD AUTO: 291 10*3/MM3 (ref 150–450)
PMV BLD AUTO: 10.2 FL (ref 8–12)
POTASSIUM BLD-SCNC: 4.5 MMOL/L (ref 3.5–5.1)
PROTHROMBIN TIME: 21 SECONDS (ref 11.1–15.3)
RBC # BLD AUTO: 3.56 10*6/MM3 (ref 3.77–5.16)
SODIUM BLD-SCNC: 139 MMOL/L (ref 137–145)
STRESS TARGET HR: 122 BPM
WBC NRBC COR # BLD: 11.12 10*3/MM3 (ref 3.2–9.8)

## 2018-03-12 PROCEDURE — 97110 THERAPEUTIC EXERCISES: CPT

## 2018-03-12 PROCEDURE — 76937 US GUIDE VASCULAR ACCESS: CPT

## 2018-03-12 PROCEDURE — 82962 GLUCOSE BLOOD TEST: CPT

## 2018-03-12 PROCEDURE — 85610 PROTHROMBIN TIME: CPT | Performed by: HOSPITALIST

## 2018-03-12 PROCEDURE — 80048 BASIC METABOLIC PNL TOTAL CA: CPT | Performed by: HOSPITALIST

## 2018-03-12 PROCEDURE — 97535 SELF CARE MNGMENT TRAINING: CPT

## 2018-03-12 PROCEDURE — B54MZZA ULTRASONOGRAPHY OF RIGHT UPPER EXTREMITY VEINS, GUIDANCE: ICD-10-PCS | Performed by: HOSPITALIST

## 2018-03-12 PROCEDURE — 63710000001 INSULIN ASPART PER 5 UNITS: Performed by: INTERNAL MEDICINE

## 2018-03-12 PROCEDURE — C1751 CATH, INF, PER/CENT/MIDLINE: HCPCS

## 2018-03-12 PROCEDURE — 97530 THERAPEUTIC ACTIVITIES: CPT

## 2018-03-12 PROCEDURE — 85025 COMPLETE CBC W/AUTO DIFF WBC: CPT | Performed by: HOSPITALIST

## 2018-03-12 PROCEDURE — 05HB33Z INSERTION OF INFUSION DEVICE INTO RIGHT BASILIC VEIN, PERCUTANEOUS APPROACH: ICD-10-PCS | Performed by: HOSPITALIST

## 2018-03-12 PROCEDURE — 73564 X-RAY EXAM KNEE 4 OR MORE: CPT

## 2018-03-12 RX ORDER — CALCITRIOL 0.25 UG/1
0.25 CAPSULE, LIQUID FILLED ORAL 3 TIMES WEEKLY
Status: DISCONTINUED | OUTPATIENT
Start: 2018-03-14 | End: 2018-03-13 | Stop reason: HOSPADM

## 2018-03-12 RX ADMIN — CARVEDILOL 12.5 MG: 12.5 TABLET, FILM COATED ORAL at 08:41

## 2018-03-12 RX ADMIN — LEVOTHYROXINE SODIUM 75 MCG: 75 TABLET ORAL at 08:41

## 2018-03-12 RX ADMIN — CLONIDINE HYDROCHLORIDE 0.4 MG: 0.2 TABLET ORAL at 21:04

## 2018-03-12 RX ADMIN — INSULIN ASPART 10 UNITS: 100 INJECTION, SOLUTION INTRAVENOUS; SUBCUTANEOUS at 11:23

## 2018-03-12 RX ADMIN — INSULIN ASPART 8 UNITS: 100 INJECTION, SOLUTION INTRAVENOUS; SUBCUTANEOUS at 17:43

## 2018-03-12 RX ADMIN — INSULIN ASPART 3 UNITS: 100 INJECTION, SOLUTION INTRAVENOUS; SUBCUTANEOUS at 08:41

## 2018-03-12 RX ADMIN — NYSTATIN: 100000 POWDER TOPICAL at 07:47

## 2018-03-12 RX ADMIN — ATORVASTATIN CALCIUM 10 MG: 10 TABLET, FILM COATED ORAL at 08:41

## 2018-03-12 RX ADMIN — WARFARIN SODIUM 2.5 MG: 2.5 TABLET ORAL at 17:42

## 2018-03-12 RX ADMIN — PANTOPRAZOLE SODIUM 40 MG: 40 TABLET, DELAYED RELEASE ORAL at 06:06

## 2018-03-12 RX ADMIN — INSULIN ASPART 5 UNITS: 100 INJECTION, SOLUTION INTRAVENOUS; SUBCUTANEOUS at 21:05

## 2018-03-12 RX ADMIN — HYDROCODONE BITARTRATE AND ACETAMINOPHEN 1 TABLET: 7.5; 325 TABLET ORAL at 10:26

## 2018-03-12 RX ADMIN — ASPIRIN 81 MG: 81 TABLET, COATED ORAL at 08:42

## 2018-03-12 RX ADMIN — NYSTATIN: 100000 POWDER TOPICAL at 08:45

## 2018-03-12 RX ADMIN — HYDROCODONE BITARTRATE AND ACETAMINOPHEN 1 TABLET: 7.5; 325 TABLET ORAL at 21:04

## 2018-03-12 RX ADMIN — FUROSEMIDE 80 MG: 80 TABLET ORAL at 08:42

## 2018-03-12 RX ADMIN — ACETAMINOPHEN 650 MG: 325 TABLET ORAL at 15:13

## 2018-03-12 RX ADMIN — CLORAZEPATE DIPOTASSIUM 3.75 MG: 3.75 TABLET ORAL at 21:04

## 2018-03-12 RX ADMIN — CLONIDINE HYDROCHLORIDE 0.2 MG: 0.2 TABLET ORAL at 08:42

## 2018-03-12 RX ADMIN — CALCITRIOL 0.25 MCG: 0.25 CAPSULE, LIQUID FILLED ORAL at 08:41

## 2018-03-12 RX ADMIN — FUROSEMIDE 80 MG: 80 TABLET ORAL at 17:41

## 2018-03-12 RX ADMIN — NYSTATIN 1 APPLICATION: 100000 POWDER TOPICAL at 21:05

## 2018-03-12 RX ADMIN — CARVEDILOL 12.5 MG: 12.5 TABLET, FILM COATED ORAL at 17:41

## 2018-03-12 NOTE — DISCHARGE PLACEMENT REQUEST
"Dorcas Vargas (77 y.o. Female)     Date of Birth Social Security Number Address Home Phone MRN    [id number]  [id number] [facility] [address] [phone] [id number]    Yazidi Marital Status          Non-Muslim        Admission Date Admission Type Admitting Provider Attending Provider Department, Room/Bed    3/6/18 Urgent Nathan Mariscal MD Echendu, Anthony W, MD [facility] 3 EAST, 364/1    Discharge Date Discharge Disposition Discharge Destination                       Attending Provider:  Nathan Mariscal MD    Allergies:  Cymbalta [Duloxetine Hcl], Lipitor [Atorvastatin], Vytorin [Ezetimibe-simvastatin]    Isolation:  None   Infection:  None   Code Status:  FULL    Ht:  157.5 cm (62\")   Wt:  57.9 kg (127 lb 10.3 oz)    Admission Cmt:  None   Principal Problem:  None                Active Insurance as of 3/6/2018     Primary Coverage     Payor Plan Insurance Group Employer/Plan Group    MEDICARE MEDICARE A & B      Payor Plan Address Payor Plan Phone Number Effective From Effective To    [address] [phone] 12/1/2006     [place] [address]       Subscriber Name Subscriber Birth Date Member ID       DORCAS VARGAS 1940 [id number]           Secondary Coverage     Payor Plan Insurance Group Employer/Plan Group    [employer]ARP MED SUPP AAR HEALTH CARE OPTIONS      Payor Plan Address Payor Plan Phone Number Effective From Effective To    [facility] [phone] 1/1/2018     [address]       [place] [address]       Subscriber Name Subscriber Birth Date Member ID       DORCAS VARGAS 1940 [id number]                 Emergency Contacts      (Rel.) Home Phone Work Phone Mobile Phone    Altaf Vargas (Spouse) [phone] -- [phone]               History & Physical      Bob Arriaga MD at 3/6/2018 11:14 PM                [facility] Admission      Date of Admission: " 3/6/2018      Primary Care Physician: Markell Muller MD      Chief Complaint:  Leg pain    HPI:  Patient was adversarial during the interview, so I was unable to obtain the level of detail that I would have liked to obtain.  Patient was primarily concerned about using the bedside commode instead of the bedpan, signing out AMA, and seeing only Dr. Osorio.  I was able to gather from the chart that the patient had been started on Entresto, but had worsening leg pain.  She has chronic claudication.  She called her physician in [place] and was told to stop the Entresto and double up on her lasix.  After she increased her lasix dose, she developed worsening pain and went to the ED in [facility].  She was found to have MELODY on CKD, a UTI, and an elevated troponin.  She was transferred to our service for further care.    Concurrent Medical History:  has a past medical history of CHF (congestive heart failure); Diabetes mellitus; GERD (gastroesophageal reflux disease); Hypercholesteremia; and Hypertension.    Past Surgical History:  has no past surgical history on file.    Family History:  Patient declined to answer    Social History:  Patient declined to answer    Allergies:   Allergies   Allergen Reactions   • Cymbalta [Duloxetine Hcl] GI Intolerance   • Lipitor [Atorvastatin] GI Intolerance   • Vytorin [Ezetimibe-Simvastatin] GI Intolerance       Medications:   Prescriptions Prior to Admission   Medication Sig Dispense Refill Last Dose   • aspirin 81 MG EC tablet Take 81 mg by mouth Daily. Medical record from transferring hospital      • calcitriol (ROCALTROL) 0.25 MCG capsule Take 0.25 mcg by mouth Daily. Medical record from transferring hospital      • carvedilol (COREG) 12.5 MG tablet Take 12.5 mg by mouth 2 (Two) Times a Day With Meals.      • CloNIDine (CATAPRES) 0.2 MG tablet Take 0.2 mg by mouth Daily. Daily with breakfast      • CloNIDine (CATAPRES) 0.2 MG tablet Take 0.4 mg by mouth Every Night.       • clorazepate (TRANXENE) 3.75 MG tablet Take 3.75 mg by mouth Every Night.          Review of Systems:  Review of Systems   Unable to perform ROS: Other   Patient declined       Physical Exam:      Physical Exam   Constitutional: She is oriented to person, place, and time. She appears well-developed and well-nourished.   HENT:   Head: Normocephalic and atraumatic.   Nose: Nose normal.   Mouth/Throat: Oropharynx is clear and moist.   Eyes: Conjunctivae, EOM and lids are normal. Pupils are equal, round, and reactive to light. No scleral icterus.   Neck: Normal range of motion. Neck supple. No JVD present. No tracheal tenderness and no spinous process tenderness present. No rigidity. No tracheal deviation, no edema and normal range of motion present. No thyromegaly present.   Cardiovascular: Normal rate, regular rhythm, S1 normal, S2 normal, normal heart sounds and normal pulses.  Exam reveals no gallop and no friction rub.    No murmur heard.  Pulmonary/Chest: Effort normal and breath sounds normal. No accessory muscle usage. No respiratory distress. She has no decreased breath sounds. She has no wheezes. She has no rales. She exhibits no tenderness.   Abdominal: Soft. Bowel sounds are normal. She exhibits no distension and no mass. There is no tenderness. There is no rebound and no guarding.   Musculoskeletal: She exhibits no edema or tenderness.        Right shoulder: She exhibits normal range of motion, no tenderness and no pain.   Lymphadenopathy:     She has no cervical adenopathy.   Neurological: She is alert and oriented to person, place, and time. She exhibits normal muscle tone. She displays no seizure activity.   Patient very argumentative and confrontational.  Not at all cooperative.   Skin: Skin is warm. No rash noted. No pallor.   Chronic changes on bilateral lower extremities   Psychiatric: Judgment and thought content normal. Her affect is angry.   Patient very argumentative and confrontational.   Not at all cooperative.         Results Reviewed:  I have personally reviewed current lab, radiology, and data and agree with results.  Lab Results (last 24 hours)     ** No results found for the last 24 hours. **        Imaging Results (last 24 hours)     ** No results found for the last 24 hours. **            Assessment:    Hospital Problem List     MELODY (acute kidney injury)                Plan:  1.  MELODY:  Seems prerenal due to volume depletion.  She was taking lasix 80mg bid and zaroxolyn 2.5 mg prn.  Will hold diuretics and gently hydrate.  I have discussed with Dr. Leon.  He will see in consultation.  2.  UTI:  Culture pending.  Will treat empirically.  3.  HTN:  Continue home meds except lisinopril.  4.  Chronic PAD:  Home pain meds.  5.  DM:  SSI  6.  DVT Prophylaxis:  Heparin      I discussed the patients findings and my recommendations with:  Patient and family        This document has been electronically signed by Bob Arriaga MD on March 6, 2018 11:14 PM                    Electronically signed by Bob Arriaga MD at 3/7/2018 10:27 AM       Prior to Admission Medications     Prescriptions Last Dose Informant Patient Reported? Taking?    aspirin 81 MG EC tablet  EMS/Transport Team Yes Yes    Take 81 mg by mouth Daily. Medical record from transferring hospital    calcitriol (ROCALTROL) 0.25 MCG capsule  EMS/Transport Team Yes Yes    Take 0.25 mcg by mouth Daily. Medical record from transferring hospital    carvedilol (COREG) 12.5 MG tablet  EMS/Transport Team Yes Yes    Take 12.5 mg by mouth 2 (Two) Times a Day With Meals.    CloNIDine (CATAPRES) 0.2 MG tablet   Yes Yes    Take 0.2 mg by mouth Daily. Daily with breakfast    CloNIDine (CATAPRES) 0.2 MG tablet  EMS/Transport Team Yes Yes    Take 0.4 mg by mouth Every Night.    clorazepate (TRANXENE) 3.75 MG tablet  EMS/Transport Team Yes Yes    Take 3.75 mg by mouth Every Night.    colchicine 0.6 MG tablet   Yes Yes    Take 0.6 mg by mouth As  Needed for Muscle / Joint Pain.    esomeprazole (nexIUM) 40 MG capsule   Yes Yes    Take 40 mg by mouth Every Morning Before Breakfast.    furosemide (LASIX) 80 MG tablet   Yes Yes    Take 80 mg by mouth 2 (Two) Times a Day.    glyBURIDE (DIAbeta) 5 MG tablet   Yes Yes    Take 10 mg by mouth 2 (Two) Times a Day.    HYDROcodone-acetaminophen (NORCO) 5-325 MG per tablet   Yes Yes    Take 1.5 tablets by mouth Every 6 (Six) Hours As Needed.    insulin glargine (LANTUS) 100 UNIT/ML injection   Yes Yes    Inject 20 Units under the skin Every Night.    insulin lispro (humaLOG) 100 UNIT/ML injection   Yes Yes    Inject 5 Units under the skin.    levothyroxine (SYNTHROID, LEVOTHROID) 75 MCG tablet   Yes Yes    Take 75 mcg by mouth Daily.    lisinopril (PRINIVIL,ZESTRIL) 20 MG tablet   Yes Yes    Take 20 mg by mouth Daily.    metOLazone (ZAROXOLYN) 2.5 MG tablet   Yes Yes    Take 2.5 mg by mouth As Needed.    potassium chloride (K-DUR,KLOR-CON) 20 MEQ CR tablet   Yes Yes    Take 20 mEq by mouth 2 (Two) Times a Day.    pravastatin (PRAVACHOL) 20 MG tablet   Yes Yes    Take 80 mg by mouth Every Night.    promethazine (PHENERGAN) 25 MG tablet   Yes Yes    Take 25 mg by mouth As Needed for Nausea or Vomiting.    vitamin D (ERGOCALCIFEROL) 37714 units capsule capsule   Yes Yes    Take 50,000 Units by mouth.    warfarin (COUMADIN) 1 MG tablet   Yes Yes    Take 1 mg by mouth. Take every Tuesday, Wednesday, Friday, Saturday, Sunday    warfarin (COUMADIN) 2.5 MG tablet   Yes Yes    Take 2.5 mg by mouth 2 (Two) Times a Week. On Mondays and Thursdays          Hospital Medications (active)       Dose Frequency Start End    acetaminophen (TYLENOL) tablet 650 mg 650 mg Every 6 Hours PRN 3/9/2018     Sig - Route: Take 2 tablets by mouth Every 6 (Six) Hours As Needed for Mild Pain . - Oral    aspirin EC tablet 81 mg 81 mg Daily 3/7/2018     Sig - Route: Take 1 tablet by mouth Daily. - Oral    atorvastatin (LIPITOR) tablet 10 mg 10 mg Daily  3/7/2018     Sig - Route: Take 1 tablet by mouth Daily. - Oral    calcitriol (ROCALTROL) capsule 0.25 mcg 0.25 mcg Daily 3/7/2018     Sig - Route: Take 1 capsule by mouth Daily. - Oral    carvedilol (COREG) tablet 12.5 mg 12.5 mg 2 Times Daily With Meals 3/6/2018     Sig - Route: Take 1 tablet by mouth 2 (Two) Times a Day With Meals. - Oral    cefTRIAXone (ROCEPHIN) 1 g/100 mL 0.9% NS (MBP) 1 g Every 24 Hours 3/7/2018 3/13/2018    Sig - Route: Infuse 100 mL into a venous catheter Daily. - Intravenous    CloNIDine (CATAPRES) tablet 0.2 mg 0.2 mg Daily 3/7/2018     Sig - Route: Take 1 tablet by mouth Daily. - Oral    CloNIDine (CATAPRES) tablet 0.4 mg 0.4 mg Nightly 3/6/2018     Sig - Route: Take 2 tablets by mouth Every Night. - Oral    clorazepate (TRANXENE) tablet 3.75 mg 3.75 mg Nightly 3/6/2018     Sig - Route: Take 1 tablet by mouth Every Night. - Oral    dextrose (D50W) solution 25 g 25 g Every 15 Minutes PRN 3/7/2018     Sig - Route: Infuse 50 mL into a venous catheter Every 15 (Fifteen) Minutes As Needed for Low Blood Sugar (Blood Sugar Less Than 70). - Intravenous    dextrose (GLUTOSE) oral gel 15 g 15 g Every 15 Minutes PRN 3/7/2018     Sig - Route: Take 15 g by mouth Every 15 (Fifteen) Minutes As Needed for Low Blood Sugar (Blood sugar less than 70). - Oral    furosemide (LASIX) tablet 80 mg 80 mg 2 Times Daily (Diuretics) 3/11/2018     Sig - Route: Take 1 tablet by mouth 2 (Two) Times a Day. - Oral    glucagon (human recombinant) (GLUCAGEN DIAGNOSTIC) injection 1 mg 1 mg As Needed 3/7/2018     Sig - Route: Inject 1 mg under the skin As Needed (Blood Glucose Less Than 70). - Subcutaneous    HYDROcodone-acetaminophen (NORCO) 7.5-325 MG per tablet 1 tablet 1 tablet Every 6 Hours PRN 3/7/2018     Sig - Route: Take 1 tablet by mouth Every 6 (Six) Hours As Needed for Moderate Pain . - Oral    insulin aspart (novoLOG) injection 0-14 Units 0-14 Units 4 Times Daily Before Meals & Nightly 3/7/2018     Sig - Route:  "Inject 0-14 Units under the skin 4 (Four) Times a Day Before Meals & at Bedtime. - Subcutaneous    levothyroxine (SYNTHROID, LEVOTHROID) tablet 75 mcg 75 mcg Daily 3/7/2018     Sig - Route: Take 1 tablet by mouth Daily. - Oral    Magnesium Sulfate 2 gram infusion- Mg 1.6 - 1.9 mg/dL 2 g As Needed 3/8/2018     Sig - Route: Infuse 50 mL into a venous catheter As Needed (Mg 1.6 - 1.9 mg/dL). - Intravenous    Cosign for Ordering: Accepted by Edgardo Mooney MD on 3/8/2018  5:16 PM    Linked Group 1:  \"Or\" Linked Group Details        magnesium sulfate 3 gram infusion (1gm x 3) - Mg 1.1 - 1.5 mg/dL 1 g As Needed 3/8/2018     Sig - Route: Infuse 100 mL into a venous catheter As Needed (Mg 1.1 - 1.5 mg/dL). - Intravenous    Cosign for Ordering: Accepted by Edgardo Mooney MD on 3/8/2018  5:16 PM    Linked Group 1:  \"Or\" Linked Group Details        magnesium sulfate 4 gram infusion - Mg less than or equal to 1mg/dL 4 g As Needed 3/8/2018     Sig - Route: Infuse 100 mL into a venous catheter As Needed (Mg less than or equal to 1mg/dL). - Intravenous    Cosign for Ordering: Accepted by Edgardo Mooney MD on 3/8/2018  5:16 PM    Linked Group 1:  \"Or\" Linked Group Details        morphine injection 1 mg 1 mg Every 4 Hours PRN 3/6/2018 3/17/2018    Sig - Route: Infuse 0.5 mL into a venous catheter Every 4 (Four) Hours As Needed for Severe Pain . - Intravenous    nystatin (MYCOSTATIN) powder  Every 12 Hours Scheduled 3/8/2018     Sig - Route: Apply  topically Every 12 (Twelve) Hours. - Topical    pantoprazole (PROTONIX) EC tablet 40 mg 40 mg Every Morning 3/7/2018     Sig - Route: Take 1 tablet by mouth Every Morning. - Oral    Pharmacy to dose warfarin  Continuous PRN 3/7/2018     Sig - Route: Continuous As Needed for Consult (to keep INR ). - Does not apply    sodium chloride 0.9 % flush 1-10 mL 1-10 mL As Needed 3/6/2018     Sig - Route: Infuse 1-10 mL into a venous catheter As Needed for Line Care. - Intravenous    warfarin " (COUMADIN) tablet 1 mg 1 mg User Specified 3/9/2018     Sig - Route: Take 1 tablet by mouth Once per day on Sun Tue Wed Fri Sat. - Oral    warfarin (COUMADIN) tablet 2.5 mg 2.5 mg User Specified 3/8/2018     Sig - Route: Take 1 tablet by mouth Once per day on Mon Thu. - Oral          Lab Results (last 72 hours)     Procedure Component Value Units Date/Time    Protime-INR [[id number]]  (Abnormal) Collected:  03/12/18 0739    Specimen:  Blood Updated:  03/12/18 0837     Protime 21.0 (H) Seconds      INR 1.89 (H)    Narrative:       Therapeutic range for most indications is 2.0-3.0 INR,  or 2.5-3.5 for mechanical heart valves.    Basic Metabolic Panel [[id number]]  (Abnormal) Collected:  03/12/18 0740    Specimen:  Blood Updated:  03/12/18 0818     Glucose 157 (H) mg/dL      BUN 59 (H) mg/dL      Creatinine 1.86 (H) mg/dL      Sodium 139 mmol/L      Potassium 4.5 mmol/L      Chloride 102 mmol/L      CO2 26.0 mmol/L      Calcium 10.3 (H) mg/dL      eGFR Non African Amer 26 (L) mL/min/1.73      BUN/Creatinine Ratio 31.7 (H)     Anion Gap 11.0 mmol/L     Narrative:       The MDRD GFR formula is only valid for adults with stable renal function between ages 18 and 70.    CBC & Differential [[id number]] Collected:  03/12/18 0739    Specimen:  Blood Updated:  03/12/18 0807    Narrative:       The following orders were created for panel order CBC & Differential.  Procedure                               Abnormality         Status                     ---------                               -----------         ------                     CBC Auto Differential[[id number]]        Abnormal            Final result                 Please view results for these tests on the individual orders.    CBC Auto Differential [[id number]]  (Abnormal) Collected:  03/12/18 0739    Specimen:  Blood Updated:  03/12/18 0807     WBC 11.12 (H) 10*3/mm3      RBC 3.56 (L) 10*6/mm3      Hemoglobin 9.5 (L) g/dL      Hematocrit 29.8 (L) %      MCV 83.7 fL       MCH 26.7 pg      MCHC 31.9 g/dL      RDW 18.5 (H) %      RDW-SD 57.3 (H) fl      MPV 10.2 fL      Platelets 291 10*3/mm3      Neutrophil % 76.1 %      Lymphocyte % 11.6 %      Monocyte % 8.1 %      Eosinophil % 3.5 %      Basophil % 0.4 %      Immature Grans % 0.3 %      Neutrophils, Absolute 8.47 10*3/mm3      Lymphocytes, Absolute 1.29 10*3/mm3      Monocytes, Absolute 0.90 10*3/mm3      Eosinophils, Absolute 0.39 10*3/mm3      Basophils, Absolute 0.04 10*3/mm3      Immature Grans, Absolute 0.03 (H) 10*3/mm3      nRBC 0.0 /100 WBC     POC Glucose Once [[id number]]  (Abnormal) Collected:  03/12/18 0731    Specimen:  Blood Updated:  03/12/18 0746     Glucose 172 (H) mg/dL      Comment: RN NotifiedMeter: [id number]: [id number] SVETLANA [place]OR[place]O       Blood Culture - Blood, [[id number]]  (Normal) Collected:  03/09/18 0244    Specimen:  Blood from Hand, Left Updated:  03/12/18 0346     Blood Culture No growth at 3 days    Blood Culture - Blood, [[id number]]  (Normal) Collected:  03/09/18 0245    Specimen:  Blood from Wrist, Left Updated:  03/12/18 0346     Blood Culture No growth at 3 days    POC Glucose Once [[id number]]  (Abnormal) Collected:  03/11/18 1954    Specimen:  Blood Updated:  03/11/18 2047     Glucose 252 (H) mg/dL      Comment: RN NotifiedMeter: [id number]: [id number] [facility]       POC Glucose Once [[id number]]  (Abnormal) Collected:  03/11/18 1931    Specimen:  Blood Updated:  03/11/18 1946     Glucose 267 (H) mg/dL      Comment: RN NotifiedMeter: [id number]: [id number] ELIZABETH LUCIA       POC Glucose Once [[id number]]  (Abnormal) Collected:  03/11/18 1632    Specimen:  Blood Updated:  03/11/18 1653     Glucose 246 (H) mg/dL      Comment: RN NotifiedMeter: [id number]: [id number] JOHN DE ANDA       Urine Culture - Urine, Urine, Clean Catch [[id number]]  (Normal) Collected:  03/10/18 1032    Specimen:  Urine from Urine, Clean Catch Updated:  03/11/18 1216     Urine  Culture No growth at 24 hours    POC Glucose Once [[id number]]  (Abnormal) Collected:  03/11/18 1100    Specimen:  Blood Updated:  03/11/18 1114     Glucose 260 (H) mg/dL      Comment: RN NotifiedMeter: [id number]: [id number] [facility]       POC Glucose Once [[id number]]  (Abnormal) Collected:  03/11/18 0717    Specimen:  Blood Updated:  03/11/18 0735     Glucose 170 (H) mg/dL      Comment: RN NotifiedMeter: [id number]: [id number] [facility]       Basic Metabolic Panel [[id number]]  (Abnormal) Collected:  03/11/18 0612    Specimen:  Blood Updated:  03/11/18 0718     Glucose 156 (H) mg/dL      BUN 60 (H) mg/dL      Creatinine 2.14 (H) mg/dL      Sodium 139 mmol/L      Potassium 4.3 mmol/L      Chloride 99 mmol/L      CO2 29.0 mmol/L      Calcium 9.9 mg/dL      eGFR Non African Amer 22 (L) mL/min/1.73      BUN/Creatinine Ratio 28.0 (H)     Anion Gap 11.0 mmol/L     Narrative:       The MDRD GFR formula is only valid for adults with stable renal function between ages 18 and 70.    Protime-INR [[id number]]  (Abnormal) Collected:  03/11/18 0612    Specimen:  Blood Updated:  03/11/18 0716     Protime 23.7 (H) Seconds      INR 2.22 (H)    Narrative:       Therapeutic range for most indications is 2.0-3.0 INR,  or 2.5-3.5 for mechanical heart valves.    CBC & Differential [[id number]] Collected:  03/11/18 0612    Specimen:  Blood Updated:  03/11/18 0700    Narrative:       The following orders were created for panel order CBC & Differential.  Procedure                               Abnormality         Status                     ---------                               -----------         ------                     CBC Auto Differential[[id number]]        Abnormal            Final result                 Please view results for these tests on the individual orders.    CBC Auto Differential [[id number]]  (Abnormal) Collected:  03/11/18 0612    Specimen:  Blood Updated:  03/11/18 0700     WBC 11.86 (H)  10*3/mm3      RBC 3.50 (L) 10*6/mm3      Hemoglobin 9.3 (L) g/dL      Hematocrit 29.1 (L) %      MCV 83.1 fL      MCH 26.6 pg      MCHC 32.0 g/dL      RDW 18.1 (H) %      RDW-SD 54.7 (H) fl      MPV 11.1 fL      Platelets 304 10*3/mm3      Neutrophil % 76.3 %      Lymphocyte % 12.6 %      Monocyte % 7.8 %      Eosinophil % 2.7 %      Basophil % 0.2 %      Immature Grans % 0.4 %      Neutrophils, Absolute 9.04 (H) 10*3/mm3      Lymphocytes, Absolute 1.50 10*3/mm3      Monocytes, Absolute 0.93 (H) 10*3/mm3      Eosinophils, Absolute 0.32 10*3/mm3      Basophils, Absolute 0.02 10*3/mm3      Immature Grans, Absolute 0.05 (H) 10*3/mm3     POC Glucose Once [[id number]]  (Abnormal) Collected:  03/10/18 2106    Specimen:  Blood Updated:  03/10/18 2124     Glucose 284 (H) mg/dL      Comment: RN NotifiedMeter: [id number]: [id number] CELINE TIRADO       POC Glucose Once [[id number]]  (Abnormal) Collected:  03/10/18 1643    Specimen:  Blood Updated:  03/10/18 1701     Glucose 229 (H) mg/dL      Comment: RN NotifiedMeter: [id number]: [id number] JOHN DE ANDA       Urinalysis, Microscopic Only - Urine, Clean Catch [[id number]]  (Abnormal) Collected:  03/10/18 1032    Specimen:  Urine from Urine, Clean Catch Updated:  03/10/18 1131     RBC, UA 0-2 (A) /HPF      WBC, UA 21-30 (A) /HPF      Bacteria, UA Trace (A) /HPF      Squamous Epithelial Cells, UA 3-5 (A) /HPF      Hyaline Casts, UA None Seen /LPF      Granular Casts, UA 0-2 /LPF      Methodology Manual Light Microscopy    POC Glucose Once [[id number]]  (Abnormal) Collected:  03/10/18 1033    Specimen:  Blood Updated:  03/10/18 1056     Glucose 304 (H) mg/dL      Comment: RN NotifiedMeter: [id number]: [id number] JOHN DE ANDA       Urinalysis With / Microscopic If Indicated - Urine, Clean Catch [[id number]]  (Abnormal) Collected:  03/10/18 1032    Specimen:  Urine from Urine, Clean Catch Updated:  03/10/18 1048     Color, UA Yellow     Appearance, UA  Cloudy (A)     pH, UA <=5.0     Specific Gravity, UA 1.011     Glucose,  mg/dL (Trace) (A)     Ketones, UA Negative     Bilirubin, UA Negative     Blood, UA Small (1+) (A)     Protein, UA 30 mg/dL (1+) (A)     Leuk Esterase, UA Moderate (2+) (A)     Nitrite, UA Negative     Urobilinogen, UA 0.2 E.U./dL    POC Glucose Once [[id number]]  (Abnormal) Collected:  03/10/18 0733    Specimen:  Blood Updated:  03/10/18 0752     Glucose 149 (H) mg/dL      Comment: RN NotifiedMeter: [id number]: [id number] JOHN DE ANDA       Basic Metabolic Panel [[id number]]  (Abnormal) Collected:  03/10/18 0604    Specimen:  Blood Updated:  03/10/18 0739     Glucose 134 (H) mg/dL      BUN 61 (H) mg/dL      Creatinine 2.08 (H) mg/dL      Sodium 143 mmol/L      Potassium 3.3 (L) mmol/L      Chloride 102 mmol/L      CO2 26.0 mmol/L      Calcium 9.9 mg/dL      eGFR Non African Amer 23 (L) mL/min/1.73      BUN/Creatinine Ratio 29.3 (H)     Anion Gap 15.0 mmol/L     Narrative:       The MDRD GFR formula is only valid for adults with stable renal function between ages 18 and 70.    CBC & Differential [[id number]] Collected:  03/10/18 0604    Specimen:  Blood Updated:  03/10/18 0723    Narrative:       The following orders were created for panel order CBC & Differential.  Procedure                               Abnormality         Status                     ---------                               -----------         ------                     CBC Auto Differential[[id number]]        Abnormal            Final result                 Please view results for these tests on the individual orders.    CBC Auto Differential [[id number]]  (Abnormal) Collected:  03/10/18 0604    Specimen:  Blood Updated:  03/10/18 0723     WBC 14.54 (H) 10*3/mm3      RBC 3.55 (L) 10*6/mm3      Hemoglobin 9.5 (L) g/dL      Hematocrit 29.6 (L) %      MCV 83.4 fL      MCH 26.8 pg      MCHC 32.1 g/dL      RDW 18.0 (H) %      RDW-SD 55.3 (H) fl      MPV 11.2 fL       Platelets 282 10*3/mm3      Neutrophil % 79.0 %      Lymphocyte % 10.2 %      Monocyte % 8.0 %      Eosinophil % 2.2 %      Basophil % 0.2 %      Immature Grans % 0.4 %      Neutrophils, Absolute 11.48 (H) 10*3/mm3      Lymphocytes, Absolute 1.49 10*3/mm3      Monocytes, Absolute 1.16 (H) 10*3/mm3      Eosinophils, Absolute 0.32 10*3/mm3      Basophils, Absolute 0.03 10*3/mm3      Immature Grans, Absolute 0.06 (H) 10*3/mm3     Protime-INR [[id number]]  (Abnormal) Collected:  03/10/18 0604    Specimen:  Blood Updated:  03/10/18 0722     Protime 21.7 (H) Seconds      INR 1.98 (H)    Narrative:       Therapeutic range for most indications is 2.0-3.0 INR,  or 2.5-3.5 for mechanical heart valves.    Urine Culture - Urine, Urine, Clean Catch [[id number]]  (Abnormal)  (Susceptibility) Collected:  03/06/18 2320    Specimen:  Urine from Urine, Clean Catch Updated:  03/10/18 0618     Urine Culture --      >100,000 CFU/mL Klebsiella pneumoniae (A)      >100,000 CFU/mL Proteus mirabilis (A)    Susceptibility      Klebsiella pneumoniae    Proteus mirabilis       ROXANNE    ROXANNE       Amoxicillin + Clavulanate <=8/4 ug/ml Susceptible    <=8/4 ug/ml Susceptible       Ampicillin >16 ug/ml Resistant    <=8 ug/ml Susceptible       Ampicillin + Sulbactam 16/8 ug/ml Intermediate    <=8/4 ug/ml Susceptible       Cefazolin <=8 ug/ml Susceptible    <=8 ug/ml Susceptible       Cefepime <=8 ug/ml Susceptible    <=8 ug/ml Susceptible       Cefoxitin <=8 ug/ml Susceptible    <=8 ug/ml Susceptible       Ceftazidime <=1 ug/ml Susceptible    <=1 ug/ml Susceptible       Ceftriaxone <=8 ug/ml Susceptible    <=8 ug/ml Susceptible       Cefuroxime sodium <=4 ug/ml Susceptible    <=4 ug/ml Susceptible       Levofloxacin <=2 ug/ml Susceptible    <=2 ug/ml Susceptible       Nitrofurantoin <=32 ug/ml Susceptible    >64 ug/ml Resistant       Piperacillin + Tazobactam <=16 ug/ml Susceptible    <=16 ug/ml Susceptible       Tetracycline <=4 ug/ml Susceptible    >8  ug/ml Resistant       Trimethoprim + Sulfamethoxazole <=2/38 ug/ml Susceptible    <=2/38 ug/ml Susceptible                      POC Glucose Once [[id number]]  (Abnormal) Collected:  18 2108    Specimen:  Blood Updated:  18 2143     Glucose 202 (H) mg/dL      Comment: RN NotifiedMeter: [id number]: [id number] CELINE TIRADO       POC Glucose Once [[id number]]  (Abnormal) Collected:  18 1649    Specimen:  Blood Updated:  18 1703     Glucose 270 (H) mg/dL      Comment: RN NotifiedMeter: [id number]: [id number] JOHN DE ANDA       POC Glucose Once [[id number]]  (Abnormal) Collected:  18 1058    Specimen:  Blood Updated:  18 1118     Glucose 223 (H) mg/dL      Comment: RN NotifiedMeter: [id number]: [id number] JOHN DE ANDA             Operative/Procedure Notes (all)     No notes of this type exist for this encounter.        Physician Progress Notes (last 24 hours) (Notes from 3/11/2018 11:06 AM through 3/12/2018 11:06 AM)     No notes of this type exist for this encounter.        Physical Therapy Notes (last 24 hours) (Notes from 3/11/2018 11:06 AM through 3/12/2018 11:06 AM)     No notes of this type exist for this encounter.           Occupational Therapy Notes (last 24 hours) (Notes from 3/11/2018 11:06 AM through 3/12/2018 11:06 AM)      СЕРГЕЙ Martin at 3/12/2018 10:22 AM          Inpatient Rehabilitation - Occupational Therapy Progress Note  [facility]     Patient Name: Dorcas Bain  : 1940  MRN: [id number]  Today's Date: 3/12/2018     Date of Referral to OT: 18       Admit Date: 3/6/2018       ICD-10-CM ICD-9-CM   1. Impaired physical mobility Z74.09 781.99   2. Impaired mobility and activities of daily living Z74.09 799.89     Patient Active Problem List   Diagnosis   • MELODY (acute kidney injury)     Past Medical History:   Diagnosis Date   • CHF (congestive heart failure)    • Diabetes mellitus    • GERD (gastroesophageal  reflux disease)    • Hypercholesteremia    • Hypertension    • S/P CABG (coronary artery bypass graft)      Past Surgical History:   Procedure Laterality Date   • CORONARY ARTERY BYPASS GRAFT     • RENAL ARTERY STENT         Therapy Treatment    Therapy Treatment / Health Promotion    Treatment Time/Intention  Discipline: occupational therapy assistant  Document Type: therapy note (daily note)  Subjective Information: complains of, pain  Mode of Treatment: individual therapy, occupational therapy  Care Plan Review: care plan/treatment goals reviewed  Care Plan Review, Other Participant(s): spouse  Total Minutes, Occupational Therapy Treatment: 57  Patient Effort: fair  Plan of Care Review  Plan of Care Reviewed With: patient, spouse    Vitals/Pain/Safety  Vital Signs  Pre Systolic BP Rehab: 125  Pre Treatment Diastolic BP: 60  Post Systolic BP Rehab: 138  Post Treatment Diastolic BP: 53  Pretreatment Heart Rate (beats/min): 63  Posttreatment Heart Rate (beats/min): 62  Pre SpO2 (%): 96  O2 Delivery Pre Treatment: supplemental O2  Post SpO2 (%): 96  O2 Delivery Post Treatment: supplemental O2  Intra Patient Position: Supine  Post Patient Position: Supine  Pain Scale: Numbers Pre/Post-Treatment  Pain Scale: Numbers, Pretreatment: 8/10  Pain Scale: Numbers, Post-Treatment: 8/10  Pain Location - Side: Right  Pain Location - Orientation: generalized  Pain Location: knee  Pain Intervention(s): Medication (See MAR)  Pain Scale: Word Pre/Post-Treatment  Pain Location - Side: Right  Pain Location - Orientation: generalized  Pain Location: knee  Pain Intervention(s): Medication (See MAR)  Pain Scale: FACES Pre/Post-Treatment  Pain Location - Side: Right  Pain Location - Orientation: generalized  Pain Location: knee  Pain Intervention(s): Medication (See MAR)  Positioning and Restraints  Pre-Treatment Position: in bed  Post Treatment Position: bed  In Bed: call light within reach, encouraged to call for assist, exit alarm on,  with family/caregiver    Mobility,ADL,Motor, Modality  Bed Mobility Assessment/Treatment  Supine-Sit [place] (Bed Mobility): moderate assist (50% patient effort)  Sit-Supine [place] (Bed Mobility): moderate assist (50% patient effort)  Sit-Stand Transfer  Sit-Stand [place] (Transfers): minimum assist (75% patient effort)  Assistive Device (Sit-Stand Transfers): walker, front-wheeled  Stand-Sit Transfer  Stand-Sit [place] (Transfers):  (pt stood x3 and moved feet to the right to HOB )  Bathing Assessment/Intervention  Bathing [place] Level: upper body, lower body, bathing skills, moderate assist (50% patient effort)  Bathing Position: edge of bed sitting  Comment (Bathing): family reports bottom area washed per nursing after toileting  Upper Body Dressing Assessment/Training  Upper Body Dressing [place] Level: doff, don  Upper Body Dressing Position: edge of bed sitting  Comment (Upper Body Dressing): hospital gown  Lower Body Dressing Assessment/Training  Lower Body Dressing [place] Level: dependent (less than 25% patient effort), socks  Grooming Assessment/Training  [place] Level (Grooming): set up, supervision  Grooming Position: edge of bed sitting  Therapeutic Exercise  Upper Extremity (Therapeutic Exercise): bicep curl, bilateral, tricep extension, bilateral (aarom to l shoulder)  Upper Extremity Range of Motion (Therapeutic Exercise): shoulder flexion/extension, bilateral, elbow flexion/extension, bilateral  Static Sitting Balance  Level of [place] (Unsupported Sitting, Static Balance): supervision  Sitting Position (Unsupported Sitting, Static Balance): sitting on edge of bed  Time Able to Maintain Position (Unsupported Sitting, Static Balance): more than 5 minutes (30)  Comment (Unsupported Sitting, Static Balance): much encouragement to sit eob        ROM/MMT             Sensory, Edema, Orthotics          Cognition, Communication, Swallow  Cognitive  Assessment/Intervention- PT/OT  Affect/Mental Status (Cognitive): WNL  Speaking Valve  Pretreatment Heart Rate (beats/min): 63  Pre SpO2 (%): 96  Posttreatment Heart Rate (beats/min): 62  Post SpO2 (%): 96  General Eating/Swallowing Observations  Pre SpO2 (%): 96  Post SpO2 (%): 96    Outcome Summary           OT Rehab Goals     Row Name 03/12/18 1000 03/10/18 0855 03/10/18 0600       Bed Mobility Goal 1 (OT)    Activity/Assistive Device (Bed Mobility Goal 1, OT) bed mobility activities, all  -RC bed mobility activities, all  -BH bed mobility activities, all  -NN    [place] Level/Cues Needed (Bed Mobility Goal 1, OT) supervision required  - supervision required  - supervision required  -NN    Time Frame (Bed Mobility Goal 1, OT) by discharge  - by discharge  - by discharge  -NN    Barriers (Bed Mobility Goal 1, OT)  -- pt required encouragment and has increased pain with movement.   -  --    Progress/Outcomes (Bed Mobility Goal 1, OT) continuing progress toward goal;goal not met  - goal ongoing;continuing progress toward goal  - goal ongoing  -NN       Transfer Goal 1 (OT)    Activity/Assistive Device (Transfer Goal 1, OT) transfers, all  -RC transfers, all;walker, rolling;commode, bedside with drop arms  - transfers, all;walker, rolling;commode, bedside with drop arms  -NN    [place] Level/Cues Needed (Transfer Goal 1, OT) contact guard assist  - contact guard assist  - contact guard assist  -NN    Time Frame (Transfer Goal 1, OT) by discharge  - by discharge  - by discharge  -NN    Barriers (Transfers Goal 1, OT)  -- pain limits pt, inconsistent with sit to stand.   -  --    Progress/Outcome (Transfer Goal 1, OT) goal not met;continuing progress toward goal  - goal ongoing  - goal ongoing  -NN       Problem Specific Goal 1 (OT)    Problem Specific Goal 1 (OT) Pt. will complete dynamic sitting balance with UE support with cond I for 10 min sitting EOB to complete ADLS.  -  Pt. will complete dynamic sitting balance with UE support with cond I for 10 min sitting EOB to complete ADLS.  - Pt. will complete dynamic sitting balance with UE support with cond I for 10 min sitting EOB to complete ADLS.  -NN    Time Frame (Problem Specific Goal 1, OT) by discharge  - by discharge  - by discharge  -NN    Progress/Outcome (Problem Specific Goal 1, OT) continuing progress toward goal;goal not met  - goal ongoing;continuing progress toward goal  - goal ongoing  -NN       Problem Specific Goal 2 (OT)    Problem Specific Goal 2 (OT) Pt. will complete dynamic sitting balance with UE support with cond I for 10 min sitting EOB to complete ADLS.  - Pt. will complete all UB ADL with Min A, LB ADL Mod A, and grooming with set-up/cond I  -BH Pt. will complete all UB ADL with Min A, LB ADL Mod A, and grooming with set-up/cond I  -NN    Time Frame (Problem Specific Goal 2, OT) by discharge  - by discharge  - by discharge  -NN    Progress/Outcome (Problem Specific Goal 2, OT) continuing progress toward goal;goal not met  - goal ongoing;continuing progress toward goal  - goal ongoing  -NN      User Key  (r) = Recorded By, (t) = Taken By, (c) = Cosigned By    Initials Name Provider Type     Germaine Avila, OTR/L Occupational Therapist     Belkys Steve CALLAWAY/L Occupational Therapy Assistant    NN Carissa Cobos, OTR/L Occupational Therapist        Occupational Therapy Education     Title: PT OT SLP Therapies (Active)     Topic: Occupational Therapy (Active)     Point: ADL training (Active)     Description: Instruct learner(s) on proper safety adaptation and remediation techniques during self care or transfers.   Instruct in proper use of assistive devices.   Learning Progress Summary     Learner Status Readiness Method Response Comment Documented by    Patient Active Acceptance E,D ERIN   03/12/18 1017     Done Acceptance E ESTELLA,NR Educated pt on OT and POC. Educated on safety with bed  mobility, t/f, mobility, and ADL. Encoruagement participation and engagement. Educated to call for assist.  03/10/18 0855    Family Active Acceptance E,D NR   03/12/18 1017    Significant Other Done Acceptance E VU,NR Educated pt on OT and POC. Educated on safety with bed mobility, t/f, mobility, and ADL. Encoruagement participation and engagement. Educated to call for assist.  03/10/18 0855          Point: Precautions (Active)     Description: Instruct learner(s) on prescribed precautions during self-care and functional transfers.   Learning Progress Summary     Learner Status Readiness Method Response Comment Documented by    Patient Active Acceptance E,D NR   03/12/18 1017     Done Acceptance E VU,NR Educated pt on OT and POC. Educated on safety with bed mobility, t/f, mobility, and ADL. Encoruagement participation and engagement. Educated to call for assist.  03/10/18 0855     Done Acceptance E VU,NR Pt educated on role of OT and POC. Pt educated on benefit of activity, safety with bed mobility, and to use call light if she needs assistance and not to get up on her own. MR 03/09/18 1154    Family Active Acceptance E,D NR   03/12/18 1017    Significant Other Done Acceptance E VU,NR Educated pt on OT and POC. Educated on safety with bed mobility, t/f, mobility, and ADL. Encoruagement participation and engagement. Educated to call for assist.  03/10/18 0855     Done Acceptance E VU,NR Pt educated on role of OT and POC. Pt educated on benefit of activity, safety with bed mobility, and to use call light if she needs assistance and not to get up on her own. MR 03/09/18 1154          Point: Body mechanics (Active)     Description: Instruct learner(s) on proper positioning and spine alignment during self-care, functional mobility activities and/or exercises.   Learning Progress Summary     Learner Status Readiness Method Response Comment Documented by    Patient Active Acceptance E,D NR   03/12/18 1017      Done Acceptance E VU,NR Educated pt on OT and POC. Educated on safety with bed mobility, t/f, mobility, and ADL. Encoruagement participation and engagement. Educated to call for assist.  03/10/18 0855     Done Acceptance E VU,NR Pt educated on role of OT and POC. Pt educated on benefit of activity, safety with bed mobility, and to use call light if she needs assistance and not to get up on her own. MR 03/09/18 1154    Family Active Acceptance E,D NR   03/12/18 1017    Significant Other Done Acceptance E VU,NR Educated pt on OT and POC. Educated on safety with bed mobility, t/f, mobility, and ADL. Encoruagement participation and engagement. Educated to call for assist.  03/10/18 0855     Done Acceptance E VU,NR Pt educated on role of OT and POC. Pt educated on benefit of activity, safety with bed mobility, and to use call light if she needs assistance and not to get up on her own. MR 03/09/18 1154                      User Key     Initials Effective Dates Name Provider Type Discipline     10/17/16 -  KORIN Cabral/L Occupational Therapist OT     03/07/18 -  NILTON Martin/L Occupational Therapy Assistant OT     03/07/18 -  Ashley Mccoy OT Occupational Therapist OT                  OT Recommendation and Plan  Therapy Frequency (OT Eval): other (see comments) (3-14x a week)  Plan of Care Review  Plan of Care Reviewed With: patient, spouse  Plan of Care Reviewed With: patient, spouse  Outcome Summary: pt workded w/ OT required encouragement. fussing at   will need 24/7 @ at d'c        Outcome Measures     Row Name 03/12/18 0848 03/11/18 0845 03/10/18 0855       How much help from another person do you currently need...    Turning from your back to your side while in flat bed without using bedrails?  -- 2  -AM  --    Moving from lying on back to sitting on the side of a flat bed without bedrails?  -- 2  -AM  --    Moving to and from a bed to a chair (including a wheelchair)?  --  1  -AM  --    Standing up from a chair using your arms (e.g., wheelchair, bedside chair)?  -- 2  -AM  --    Climbing 3-5 steps with a railing?  -- 1  -AM  --    To walk in hospital room?  -- 1  -AM  --    AM-PAC 6 Clicks Score  -- 9  -AM  --       How much help from another is currently needed...    Putting on and taking off regular lower body clothing? 1  -RC  -- 1  -BH    Bathing (including washing, rinsing, and drying) 2  -RC  -- 2  -BH    Toileting (which includes using toilet bed pan or urinal) 2  -RC  -- 2  -BH    Putting on and taking off regular upper body clothing 2  -RC  -- 2  -BH    Taking care of personal grooming (such as brushing teeth) 3  -RC  -- 3  -BH    Eating meals 3  -RC  -- 3  -BH    Score 13  -RC  -- 13  -BH       Functional Assessment    Outcome Measure Options  -- AM-PAC 6 Clicks Basic Mobility (PT)  - AM-PAC 6 Clicks Daily Activity (OT)  -    Row Name 03/09/18 1040 03/09/18 1039          How much help from another person do you currently need...    Turning from your back to your side while in flat bed without using bedrails?  -- 2  -CB     Moving from lying on back to sitting on the side of a flat bed without bedrails?  -- 2  -CB     Moving to and from a bed to a chair (including a wheelchair)?  -- 1  -CB     Standing up from a chair using your arms (e.g., wheelchair, bedside chair)?  -- 1  -CB     Climbing 3-5 steps with a railing?  -- 1  -CB     To walk in hospital room?  -- 1  -CB     AM-PAC 6 Clicks Score  -- 8  -CB        How much help from another is currently needed...    Putting on and taking off regular lower body clothing? 1  -MR  --     Bathing (including washing, rinsing, and drying) 2  -MR  --     Toileting (which includes using toilet bed pan or urinal) 2  -MR  --     Putting on and taking off regular upper body clothing 2  -MR  --     Taking care of personal grooming (such as brushing teeth) 3  -MR  --     Eating meals 3  -MR  --     Score 13  -MR  --        Functional  Assessment    Outcome Measure Options AM-PAC 6 Clicks Daily Activity (OT)  -MR AM-PAC 6 Clicks Basic Mobility (PT)  -CB       User Key  (r) = Recorded By, (t) = Taken By, (c) = Cosigned By    Initials Name Provider Type    CB Melanie Larose, PT Physical Therapist     Germaine Avila, OTR/L Occupational Therapist    AM Imer Diaz, PTA Physical Therapy Assistant     Belkys Steve, CALLAWAY/L Occupational Therapy Assistant    MR Ashley Mccoy, OT Occupational Therapist           Time Calculation:         Time Calculation- OT     Row Name 03/12/18 1020             Time Calculation- OT    OT Start Time 0848  -RC      OT Stop Time 0945  -RC      OT Time Calculation (min) 57 min  -RC      Total Timed Code Minutes- OT 57 minute(s)  -      OT Received On 03/12/18  -        User Key  (r) = Recorded By, (t) = Taken By, (c) = Cosigned By    Initials Name Provider Type     Belkys Steve, CALLAWAY/L Occupational Therapy Assistant           Therapy Charges for Today     Code Description Service Date Service Provider Modifiers Qty    19938103803 HC OT SELF CARE/MGMT/TRAIN EA 15 MIN 3/12/2018 Belkys G Power, CALLAWAY/L GO 2    80765913359 HC OT THER PROC EA 15 MIN 3/12/2018 Belkys G Power, CALLAWAY/L GO 1    81394400555 HC OT THERAPEUTIC ACT EA 15 MIN 3/12/2018 Belkys G Power, CALLAWAY/L GO 1          OT G-codes  OT Professional Judgement Used?: Yes  OT Functional Scales Options: AM-PAC 6 Clicks Daily Activity (OT)  Score: 13  Functional Limitation: Self care  Self Care Current Status (): At least 60 percent but less than 80 percent impaired, limited or restricted  Self Care Goal Status (): At least 40 percent but less than 60 percent impaired, limited or restricted    Belkys G Power, CALLAWAY/L  3/12/2018    Electronically signed by Belkys Steve CALLAWAY/L at 3/12/2018 10:23 AM     Belkys Steve, CALLAWAY/L at 3/12/2018 10:20 AM          Problem: Patient Care Overview  Goal: Plan of Care Review  Outcome: Ongoing  (interventions implemented as appropriate)   03/12/18 1017   Coping/Psychosocial   Plan of Care Reviewed With patient;spouse   OTHER   Outcome Summary pt workded w/ OT required encouragement. fussing at  will need 24/7 @ at d'c           Electronically signed by NILTON Martin/L at 3/12/2018 10:20 AM

## 2018-03-12 NOTE — PROGRESS NOTES
Nephrology Progress Note:    Feels better today.  No edema of the lower ex midis.  Breathing status is improved.  Now on oral diuretics.  Denies cough expectation.  Planned rehabilitation placement, she is hoping to go to [place]    Patient Active Problem List   Diagnosis   • MELODY (acute kidney injury)       Medications:    aspirin 81 mg Oral Daily   atorvastatin 10 mg Oral Daily   calcitriol 0.25 mcg Oral Daily   carvedilol 12.5 mg Oral BID With Meals   ceftriaxone 1 g Intravenous Q24H   CloNIDine 0.2 mg Oral Daily   CloNIDine 0.4 mg Oral Nightly   clorazepate 3.75 mg Oral Nightly   furosemide 80 mg Oral BID   insulin aspart 0-14 Units Subcutaneous 4x Daily AC & at Bedtime   levothyroxine 75 mcg Oral Daily   nystatin  Topical Q12H   pantoprazole 40 mg Oral QAM   warfarin 1 mg Oral Once per day on Sun Tue Wed Fri Sat   warfarin 2.5 mg Oral Once per day on Mon Thu       Pharmacy to dose warfarin      Vitals:    03/12/18 0025 03/12/18 0308 03/12/18 0733 03/12/18 0839   BP:  136/62  125/60   BP Location:  Left arm  Left arm   Patient Position:  Lying  Lying   Pulse: 51 56 59 65   Resp:  18  18   Temp:  97.4 °F (36.3 °C)     TempSrc:  Oral     SpO2:  96%  96%   Weight:  57.9 kg (127 lb 10.3 oz)     Height:         I/O last 3 completed shifts:  In: 580 [P.O.:480; IV Piggyback:100]  Out: 2750 [Urine:2750]  I/O this shift:  In: 240 [P.O.:240]  Out: 500 [Urine:500]    On examination:  General:  Alert and comfortable, No distress.   is at bedside  HEENT: Pallor present, no icterus, eye movements are normal  Neck: No JVP or thyroid enlargement  Chest: Coarse breath sounds at the lung bases.  Air entry appears equal bilaterally   CVS: Heart sounds are irregular.  No pericardial rub or gallop  Abdomen: Distended, soft, normal bowel sounds, no organomegaly.  No rebound or guarding  Extremities: No edema of the lower extremities, much improved  Neuro: Grade 4 power upper and lower extremities.  No asterixis  Mentation:  Alert and oriented    Past medical illness, social history, medications, previous notes reviewed.       Laboratory results:      Recent Results (from the past 24 hour(s))   POC Glucose Once    Collection Time: 03/11/18  4:32 PM   Result Value Ref Range    Glucose 246 (H) 70 - 130 mg/dL   POC Glucose Once    Collection Time: 03/11/18  7:31 PM   Result Value Ref Range    Glucose 267 (H) 70 - 130 mg/dL   POC Glucose Once    Collection Time: 03/11/18  7:54 PM   Result Value Ref Range    Glucose 252 (H) 70 - 130 mg/dL   POC Glucose Once    Collection Time: 03/12/18  7:31 AM   Result Value Ref Range    Glucose 172 (H) 70 - 130 mg/dL   Protime-INR    Collection Time: 03/12/18  7:39 AM   Result Value Ref Range    Protime 21.0 (H) 11.1 - 15.3 Seconds    INR 1.89 (H) 0.80 - 1.20   CBC Auto Differential    Collection Time: 03/12/18  7:39 AM   Result Value Ref Range    WBC 11.12 (H) 3.20 - 9.80 10*3/mm3    RBC 3.56 (L) 3.77 - 5.16 10*6/mm3    Hemoglobin 9.5 (L) 12.0 - 15.5 g/dL    Hematocrit 29.8 (L) 35.0 - 45.0 %    MCV 83.7 80.0 - 98.0 fL    MCH 26.7 26.5 - 34.0 pg    MCHC 31.9 31.4 - 36.0 g/dL    RDW 18.5 (H) 11.5 - 14.5 %    RDW-SD 57.3 (H) 36.4 - 46.3 fl    MPV 10.2 8.0 - 12.0 fL    Platelets 291 150 - 450 10*3/mm3    Neutrophil % 76.1 37.0 - 80.0 %    Lymphocyte % 11.6 10.0 - 50.0 %    Monocyte % 8.1 0.0 - 12.0 %    Eosinophil % 3.5 0.0 - 7.0 %    Basophil % 0.4 0.0 - 2.0 %    Immature Grans % 0.3 0.0 - 0.5 %    Neutrophils, Absolute 8.47 2.00 - 8.60 10*3/mm3    Lymphocytes, Absolute 1.29 0.60 - 4.20 10*3/mm3    Monocytes, Absolute 0.90 0.00 - 0.90 10*3/mm3    Eosinophils, Absolute 0.39 0.00 - 0.70 10*3/mm3    Basophils, Absolute 0.04 0.00 - 0.20 10*3/mm3    Immature Grans, Absolute 0.03 (H) 0.00 - 0.02 10*3/mm3    nRBC 0.0 0.0 - 0.0 /100 WBC   Basic Metabolic Panel    Collection Time: 03/12/18  7:40 AM   Result Value Ref Range    Glucose 157 (H) 60 - 100 mg/dL    BUN 59 (H) 7 - 21 mg/dL    Creatinine 1.86 (H) 0.50 - 1.00  mg/dL    Sodium 139 137 - 145 mmol/L    Potassium 4.5 3.5 - 5.1 mmol/L    Chloride 102 95 - 110 mmol/L    CO2 26.0 22.0 - 31.0 mmol/L    Calcium 10.3 (H) 8.4 - 10.2 mg/dL    eGFR Non  Amer 26 (L) 39 - 90 mL/min/1.73    BUN/Creatinine Ratio 31.7 (H) 7.0 - 25.0    Anion Gap 11.0 5.0 - 15.0 mmol/L   POC Glucose Once    Collection Time: 03/12/18 11:01 AM   Result Value Ref Range    Glucose 326 (H) 70 - 130 mg/dL   ]    Imaging Results (last 24 hours)     ** No results found for the last 24 hours. **            Assessment:     Acute renal failure  Stage IV chronic kidney disease, baseline serum creatinine around 2.5  Congestive heart failure, stable volume status  History of arthralgia, gout  Urinary tract infection  Anemia  Type 2 diabetes mellitus  Secondary hyperparathyroidism  History of GI bleeding in the pastfever, being evaluated       Plan:     Acute renal failure, stage IV chronic kidney disease.  Baseline serum creatinine of 2.4-2.8.  Renal functions are Improved with a BUN of 59 and creatinine of 1.86.  Now on oral diuretics. Volume status is improved.  Serum potassium and calcium levels are stable.  Good urine output    History of congestive heart failure and edema.    Volume status is improved.  No shortness of breath.  Continued Lasix 80 mg by mouth twice a day.      Hypertension: Blood pressure readings are stable.  Currently on carvedilol, clonidine, diuretics.  Not on ACE inhibitor's or ARB at this time.    Urinary tract infection: On Rocephin.  Urine cultures with Klebsiella and Proteus, sensitive to ceftriaxone.  WBC is improved to 11.8.      Type 2 diabetes: Hemoglobin A1c was 8.8.  Patient is on insulin.  Not on metformin.  Has proteinuria.    History of arthralgia and gout: No NSAIDs.    Hypokalemia:    Serum potassium is 4.5.      Secondary hyperparathyroidism: Patient is on calcitriol.  Serum calcium is 10.3.  Cut back on calcitriol to 3 times a week    Discussed with patient and  in  detail.  Planned evaluation for rehabilitation and strengthening    Devon Downing MD

## 2018-03-12 NOTE — PROGRESS NOTES
TWO PATIENT IDENTIFIERS WERE USED. THE PATIENT WAS DRAPED WITH A FULL BODY DRAPE AND THE PATIENT'S RIGHT ARM WAS PREPPED WITH CHLORA PREP. ULTRASOUND WAS USED TO LOCALIZE THERIGHT BASILIC VEIN. SUBCUTANEOUS TISSUE AT THE CATHETER SITE WAS INFILTRATED WITH 2% LIDOCAINE. UNDER ULTRASOUND GUIDANCE, THE VEIN WAS ACCESSED WITH A 21 GAUGE  NEEDLE. AN 0.018 WIRE WAS THEN THREADED THROUGH THE NEEDLE. THE 21 GAUGE NEEDLE WAS REMOVED AND A 4 [place]ic SHEATH WAS PLACED OVER THE WIRE INTO THE VEIN.THE MIDLINE CATHETER WAS TRIMMED TO 20CM. THE MIDLINE CATHETER WAS THEN PLACED OVER THE WIRE INTO THE VEIN, THE SHEATH WAS PEELED AWAY, WIRE WAS REMOVED. CATHETER WAS FLUSHED WITH NORMAL SALINE AND CATHETER TIP APPLIED. BIOPATCH PLACED. CATHETER SECURED WITH STAT LOCK AND TEGADERM. PATIENT TOLERATED PROCEDURE WELL. THIS WAS DONE IN THE ANGIOSUITE      IMPRESSION:SUCCESSFUL PLACEMENT OF DUAL LUMEN MIDLINE.           Francie Anderson  3/12/2018  1:09 PM

## 2018-03-12 NOTE — PROGRESS NOTES
[facility] Medicine Services  INPATIENT PROGRESS NOTE    Length of Stay: 6  Date of Admission: 3/6/2018  Primary Care Physician: Markell Muller MD    Subjective   Please note that all previous progress notes, radiographical findings, lab findings, medication changes, and physical exam findings have been noted and updated as appropriate.    3/12/2018: Patient continues to demonstrate medical stability.  Creatinine continues to be in her baseline.  Patient states she feels much better.  She did work with therapy today.  Patient is agreeable to swing bed or rehabilitation at [facility].  Have discussed with patient and  and case management is assisting.  No shortness of air or chest pain.    3/11/2018: Patient is medically stable.  Creatinine near baseline.  Electrolytes within normal limits.  White blood cell count trending down.  The main issue is that patient is extremely weak, physically deconditioned, and unable to care for herself.  Patient  also notes that he is unable to care for her.  At this time patient is hostile to the idea of continued care and demonstrates even possibly paranoid tendencies.  Patient believes that her  is attempting to still all of her money, home, and auto.  Prolonged discussion had with patient along with physical therapy explaining that the patient is unsafe at this time and unable to perform any actions without maximum assist.  Will discuss with case management.    3/10/2018: Patient continues to feel better, continues to have no acute complaints.  Patient growing both Klebsiella and Proteus, both sensitive to ceftriaxone which the patient is on.  Creatinine continues to improve.  White count did trend upward, will continue to monitor.  No shortness of air, fever, chills, nausea, vomiting.  No chest pain.    3/9/2018: Patient has no acute complaints.  Did have a fever overnight, urinalysis demonstrating  gram-negative bacilli and Klebsiella, sensitivity so far shows ceftriaxone as an appropriate drug.  Patient is on ceftriaxone.  Denies shortness of air, nausea, vomiting.  Repeat blood cultures pending.  Creatinine near baseline per nephrology.  Consult appreciated.    Chief Complaint/HPI:  This 77 year old  female was emitted to Hospital services secondary to multiple medical comorbidities.  Patient has a history of stage IV chronic kidney disease, congestive heart failure, hypertension, diabetes mellitus type 2, anemia of chronic kidney disease.  Patient was recently started on Entresto by her cardiologist.  Patient began to experience increased fatigue and weakness, itching, medication was discontinued.  At that time patient complained of increased edema of the lower extremities and patient was treated with double doses of Lasix.  Patient was taking Lasix 80 mg by mouth twice a day, meaning that this was the dose was doubled.  At the time of admission creatinine had risen to 4.34.    Patient also demonstrates gram negative bacilli in the urine.  This is likely Escherichia coli.  Currently on ceftriaxone.  At this time will order echocardiogram to further evaluate valvular function.  We will order PT and OT secondary to deconditioning.  Creatinine improving, patient denies any complaints at this time.    Review of Systems   Constitutional: Negative for chills and fever.   Respiratory: Negative for shortness of breath.    Cardiovascular: Negative for chest pain.   Gastrointestinal: Negative for abdominal pain, nausea and vomiting.   Neurological: Negative for dizziness and light-headedness.      All pertinent negatives and positives are as above. All other systems have been reviewed and are negative unless otherwise stated.     Objective    Temp:  [97.2 °F (36.2 °C)-98.3 °F (36.8 °C)] 97.4 °F (36.3 °C)  Heart Rate:  [51-71] 65  Resp:  [18] 18  BP: (125-144)/(60-84) 125/60    Physical Exam   Constitutional:  She appears well-developed and well-nourished.   HENT:   Head: Normocephalic and atraumatic.   Cardiovascular: Normal rate and regular rhythm.    Pulmonary/Chest: Effort normal. No respiratory distress.   Abdominal: Soft. Bowel sounds are normal. She exhibits no distension. There is no tenderness.   Neurological: She is alert.   Skin: Skin is warm and dry.     Results Review:  I have reviewed the labs, radiology results, and diagnostic studies.    Laboratory Data:     Results from last 7 days  Lab Units 03/12/18  0740 03/11/18  0612 03/10/18  0604  03/06/18  2332   SODIUM mmol/L 139 139 143  < > 141   POTASSIUM mmol/L 4.5 4.3 3.3*  < > 3.3*   CHLORIDE mmol/L 102 99 102  < > 95   CO2 mmol/L 26.0 29.0 26.0  < > 24.0   BUN mg/dL 59* 60* 61*  < > 96*   CREATININE mg/dL 1.86* 2.14* 2.08*  < > 4.34*   GLUCOSE mg/dL 157* 156* 134*  < > 212*   CALCIUM mg/dL 10.3* 9.9 9.9  < > 9.6   BILIRUBIN mg/dL  --   --   --   --  0.6   ALK PHOS U/L  --   --   --   --  83   ALT (SGPT) U/L  --   --   --   --  29   AST (SGOT) U/L  --   --   --   --  16   ANION GAP mmol/L 11.0 11.0 15.0  < > 22.0*   < > = values in this interval not displayed.  Estimated Creatinine Clearance: 23.5 mL/min (by C-G formula based on SCr of 1.86 mg/dL (H)).            Results from last 7 days  Lab Units 03/12/18  0739 03/11/18  0612 03/10/18  0604 03/08/18  0734 03/07/18  0149   WBC 10*3/mm3 11.12* 11.86* 14.54* 12.99* 10.46*   HEMOGLOBIN g/dL 9.5* 9.3* 9.5* 11.0* 10.8*   HEMATOCRIT % 29.8* 29.1* 29.6* 33.0* 32.3*   PLATELETS 10*3/mm3 291 304 282 320 290       Results from last 7 days  Lab Units 03/12/18  0739 03/11/18  0612 03/10/18  0604 03/09/18  0809 03/08/18  0734   INR  1.89* 2.22* 1.98* 2.04* 2.05*       Culture Data:   No results found for: BLOODCX  Urine Culture   Date Value Ref Range Status   03/10/2018 No growth at 24 hours  Final     No results found for: RESPCX  No results found for: WOUNDCX  No results found for: STOOLCX  No components found for:  BODYFLD    Radiology Data:   Imaging Results (last 24 hours)     ** No results found for the last 24 hours. **          I have reviewed the patient current medications.     Assessment/Plan     Hospital Problem List     MELODY (acute kidney injury)          Plan:  Work with case management for discharge planning as appropriate.  Patient most appropriate for SNF.                This document has been electronically signed by SAMRA Mulligan on March 12, 2018 11:03 AM

## 2018-03-12 NOTE — PLAN OF CARE
Problem: Patient Care Overview  Goal: Plan of Care Review  Outcome: Ongoing (interventions implemented as appropriate)   03/12/18 1017   Coping/Psychosocial   Plan of Care Reviewed With patient;spouse   OTHER   Outcome Summary pt workded w/ OT required encouragement. fussing at  will need 24/7 @ at d'c

## 2018-03-12 NOTE — PLAN OF CARE
Problem: Patient Care Overview  Goal: Plan of Care Review  Outcome: Ongoing (interventions implemented as appropriate)   03/12/18 8462   Coping/Psychosocial   Plan of Care Reviewed With patient;spouse   Plan of Care Review   Progress no change   OTHER   Outcome Summary Pt is self limiting progression due to non participation with therapy. Pt demonstrates weak B LE's with ther ex in supine.

## 2018-03-12 NOTE — SIGNIFICANT NOTE
03/12/18 1045   Rehab Treatment   Discipline physical therapy assistant   Reason Treatment Not Performed patient/family declined treatment  (Pt states she just got her pain med and she doesnt want to do anything right now. Pt okay with PTA checking back in pm.)

## 2018-03-12 NOTE — PLAN OF CARE
Problem: Fall Risk (Adult)  Goal: Identify Related Risk Factors and Signs and Symptoms  Outcome: Outcome(s) achieved Date Met: 03/12/18    Goal: Absence of Fall  Outcome: Ongoing (interventions implemented as appropriate)      Problem: Patient Care Overview  Goal: Plan of Care Review  Outcome: Ongoing (interventions implemented as appropriate)   03/12/18 5131   Coping/Psychosocial   Plan of Care Reviewed With patient;spouse   Plan of Care Review   Progress no change   OTHER   Outcome Summary midline inserted today. vss. pain well controlled. awaiting placement.       Problem: Renal Failure/Kidney Injury, Acute (Adult)  Goal: Signs and Symptoms of Listed Potential Problems Will be Absent, Minimized or Managed (Renal Failure/Kidney Injury, Acute)  Outcome: Ongoing (interventions implemented as appropriate)      Problem: Wound (Includes Pressure Injury) (Adult)  Goal: Signs and Symptoms of Listed Potential Problems Will be Absent, Minimized or Managed (Wound)  Outcome: Ongoing (interventions implemented as appropriate)      Problem: Pain, Chronic (Adult)  Goal: Acceptable Pain/Comfort Level and Functional Ability  Outcome: Ongoing (interventions implemented as appropriate)

## 2018-03-12 NOTE — THERAPY TREATMENT NOTE
Inpatient Rehabilitation - Occupational Therapy Progress Note  [facility]     Patient Name: Dorcas Bain  : 1940  MRN: [id number]  Today's Date: 3/12/2018     Date of Referral to OT: 18       Admit Date: 3/6/2018       ICD-10-CM ICD-9-CM   1. Impaired physical mobility Z74.09 781.99   2. Impaired mobility and activities of daily living Z74.09 799.89     Patient Active Problem List   Diagnosis   • MELODY (acute kidney injury)     Past Medical History:   Diagnosis Date   • CHF (congestive heart failure)    • Diabetes mellitus    • GERD (gastroesophageal reflux disease)    • Hypercholesteremia    • Hypertension    • S/P CABG (coronary artery bypass graft)      Past Surgical History:   Procedure Laterality Date   • CORONARY ARTERY BYPASS GRAFT     • RENAL ARTERY STENT         Therapy Treatment    Therapy Treatment / Health Promotion    Treatment Time/Intention  Discipline: occupational therapy assistant  Document Type: therapy note (daily note)  Subjective Information: complains of, pain  Mode of Treatment: individual therapy, occupational therapy  Care Plan Review: care plan/treatment goals reviewed  Care Plan Review, Other Participant(s): spouse  Total Minutes, Occupational Therapy Treatment: 57  Patient Effort: fair  Plan of Care Review  Plan of Care Reviewed With: patient, spouse    Vitals/Pain/Safety  Vital Signs  Pre Systolic BP Rehab: 125  Pre Treatment Diastolic BP: 60  Post Systolic BP Rehab: 138  Post Treatment Diastolic BP: 53  Pretreatment Heart Rate (beats/min): 63  Posttreatment Heart Rate (beats/min): 62  Pre SpO2 (%): 96  O2 Delivery Pre Treatment: supplemental O2  Post SpO2 (%): 96  O2 Delivery Post Treatment: supplemental O2  Intra Patient Position: Supine  Post Patient Position: Supine  Pain Scale: Numbers Pre/Post-Treatment  Pain Scale: Numbers, Pretreatment: 8/10  Pain Scale: Numbers, Post-Treatment: 8/10  Pain Location - Side: Right  Pain Location - Orientation: generalized  Pain  Location: knee  Pain Intervention(s): Medication (See MAR)  Pain Scale: Word Pre/Post-Treatment  Pain Location - Side: Right  Pain Location - Orientation: generalized  Pain Location: knee  Pain Intervention(s): Medication (See MAR)  Pain Scale: FACES Pre/Post-Treatment  Pain Location - Side: Right  Pain Location - Orientation: generalized  Pain Location: knee  Pain Intervention(s): Medication (See MAR)  Positioning and Restraints  Pre-Treatment Position: in bed  Post Treatment Position: bed  In Bed: call light within reach, encouraged to call for assist, exit alarm on, with family/caregiver    Mobility,ADL,Motor, Modality  Bed Mobility Assessment/Treatment  Supine-Sit Wise (Bed Mobility): moderate assist (50% patient effort)  Sit-Supine Wise (Bed Mobility): moderate assist (50% patient effort)  Sit-Stand Transfer  Sit-Stand Wise (Transfers): minimum assist (75% patient effort)  Assistive Device (Sit-Stand Transfers): walker, front-wheeled  Stand-Sit Transfer  Stand-Sit Wise (Transfers):  (pt stood x3 and moved feet to the right to HOB )  Bathing Assessment/Intervention  Bathing Wise Level: upper body, lower body, bathing skills, moderate assist (50% patient effort)  Bathing Position: edge of bed sitting  Comment (Bathing): family reports bottom area washed per nursing after toileting  Upper Body Dressing Assessment/Training  Upper Body Dressing Wise Level: crispin engel  Upper Body Dressing Position: edge of bed sitting  Comment (Upper Body Dressing): hospital gown  Lower Body Dressing Assessment/Training  Lower Body Dressing Wise Level: dependent (less than 25% patient effort), socks  Grooming Assessment/Training  Wise Level (Grooming): set up, supervision  Grooming Position: edge of bed sitting  Therapeutic Exercise  Upper Extremity (Therapeutic Exercise): bicep curl, bilateral, tricep extension, bilateral (aarom to l shoulder)  Upper Extremity Range of  Motion (Therapeutic Exercise): shoulder flexion/extension, bilateral, elbow flexion/extension, bilateral  Static Sitting Balance  Level of [place] (Unsupported Sitting, Static Balance): supervision  Sitting Position (Unsupported Sitting, Static Balance): sitting on edge of bed  Time Able to Maintain Position (Unsupported Sitting, Static Balance): more than 5 minutes (30)  Comment (Unsupported Sitting, Static Balance): much encouragement to sit eob        ROM/MMT             Sensory, Edema, Orthotics          Cognition, Communication, Swallow  Cognitive Assessment/Intervention- PT/OT  Affect/Mental Status (Cognitive): WNL  Speaking Valve  Pretreatment Heart Rate (beats/min): 63  Pre SpO2 (%): 96  Posttreatment Heart Rate (beats/min): 62  Post SpO2 (%): 96  General Eating/Swallowing Observations  Pre SpO2 (%): 96  Post SpO2 (%): 96    Outcome Summary           OT Rehab Goals     Row Name 03/12/18 1000 03/10/18 0855 03/10/18 0600       Bed Mobility Goal 1 (OT)    Activity/Assistive Device (Bed Mobility Goal 1, OT) bed mobility activities, all  -RC bed mobility activities, all  - bed mobility activities, all  -NN    [place] Level/Cues Needed (Bed Mobility Goal 1, OT) supervision required  - supervision required  - supervision required  -NN    Time Frame (Bed Mobility Goal 1, OT) by discharge  - by discharge  - by discharge  -NN    Barriers (Bed Mobility Goal 1, OT)  -- pt required encouragment and has increased pain with movement.   -  --    Progress/Outcomes (Bed Mobility Goal 1, OT) continuing progress toward goal;goal not met  - goal ongoing;continuing progress toward goal  - goal ongoing  -NN       Transfer Goal 1 (OT)    Activity/Assistive Device (Transfer Goal 1, OT) transfers, all  -RC transfers, all;walker, rolling;commode, bedside with drop arms  - transfers, all;walker, rolling;commode, bedside with drop arms  -NN    [place] Level/Cues Needed (Transfer Goal 1, OT) contact guard  assist  - contact guard assist  - contact guard assist  -NN    Time Frame (Transfer Goal 1, OT) by discharge  - by discharge  - by discharge  -NN    Barriers (Transfers Goal 1, OT)  -- pain limits pt, inconsistent with sit to stand.   -  --    Progress/Outcome (Transfer Goal 1, OT) goal not met;continuing progress toward goal  - goal ongoing  - goal ongoing  -NN       Problem Specific Goal 1 (OT)    Problem Specific Goal 1 (OT) Pt. will complete dynamic sitting balance with UE support with cond I for 10 min sitting EOB to complete ADLS.  -RC Pt. will complete dynamic sitting balance with UE support with cond I for 10 min sitting EOB to complete ADLS.  - Pt. will complete dynamic sitting balance with UE support with cond I for 10 min sitting EOB to complete ADLS.  -NN    Time Frame (Problem Specific Goal 1, OT) by discharge  - by discharge  - by discharge  -NN    Progress/Outcome (Problem Specific Goal 1, OT) continuing progress toward goal;goal not met  - goal ongoing;continuing progress toward goal  - goal ongoing  -       Problem Specific Goal 2 (OT)    Problem Specific Goal 2 (OT) Pt. will complete dynamic sitting balance with UE support with cond I for 10 min sitting EOB to complete ADLS.  - Pt. will complete all UB ADL with Min A, LB ADL Mod A, and grooming with set-up/cond I  -BH Pt. will complete all UB ADL with Min A, LB ADL Mod A, and grooming with set-up/cond I  -NN    Time Frame (Problem Specific Goal 2, OT) by discharge  - by discharge  - by discharge  -NN    Progress/Outcome (Problem Specific Goal 2, OT) continuing progress toward goal;goal not met  - goal ongoing;continuing progress toward goal  - goal ongoing  -NN      User Key  (r) = Recorded By, (t) = Taken By, (c) = Cosigned By    Initials Name Provider Type     Germaine Avila, OTR/L Occupational Therapist    RC NILTON Martin/L Occupational Therapy Assistant    NN Carissa Cobos, OTR/L Occupational  Therapist        Occupational Therapy Education     Title: PT OT SLP Therapies (Active)     Topic: Occupational Therapy (Active)     Point: ADL training (Active)     Description: Instruct learner(s) on proper safety adaptation and remediation techniques during self care or transfers.   Instruct in proper use of assistive devices.   Learning Progress Summary     Learner Status Readiness Method Response Comment Documented by    Patient Active Acceptance E,D NR   03/12/18 1017     Done Acceptance E VU,NR Educated pt on OT and POC. Educated on safety with bed mobility, t/f, mobility, and ADL. Encoruagement participation and engagement. Educated to call for assist.  03/10/18 0855    Family Active Acceptance E,D NR   03/12/18 1017    Significant Other Done Acceptance E VU,NR Educated pt on OT and POC. Educated on safety with bed mobility, t/f, mobility, and ADL. Encoruagement participation and engagement. Educated to call for assist.  03/10/18 0855          Point: Precautions (Active)     Description: Instruct learner(s) on prescribed precautions during self-care and functional transfers.   Learning Progress Summary     Learner Status Readiness Method Response Comment Documented by    Patient Active Acceptance E,D NR   03/12/18 1017     Done Acceptance E VU,NR Educated pt on OT and POC. Educated on safety with bed mobility, t/f, mobility, and ADL. Encoruagement participation and engagement. Educated to call for assist.  03/10/18 0855     Done Acceptance E VU,NR Pt educated on role of OT and POC. Pt educated on benefit of activity, safety with bed mobility, and to use call light if she needs assistance and not to get up on her own. MR 03/09/18 1154    Family Active Acceptance E,D NR   03/12/18 1017    Significant Other Done Acceptance E VU,NR Educated pt on OT and POC. Educated on safety with bed mobility, t/f, mobility, and ADL. Encoruagement participation and engagement. Educated to call for assist.   03/10/18 0855     Done Acceptance E VU,NR Pt educated on role of OT and POC. Pt educated on benefit of activity, safety with bed mobility, and to use call light if she needs assistance and not to get up on her own. MR 03/09/18 1154          Point: Body mechanics (Active)     Description: Instruct learner(s) on proper positioning and spine alignment during self-care, functional mobility activities and/or exercises.   Learning Progress Summary     Learner Status Readiness Method Response Comment Documented by    Patient Active Acceptance TORSTEN FRAZIER   03/12/18 1017     Done Acceptance E VU,NR Educated pt on OT and POC. Educated on safety with bed mobility, t/f, mobility, and ADL. Encoruagement participation and engagement. Educated to call for assist.  03/10/18 0855     Done Acceptance E ESTELLA,NR Pt educated on role of OT and POC. Pt educated on benefit of activity, safety with bed mobility, and to use call light if she needs assistance and not to get up on her own. MR 03/09/18 1154    Family Active Acceptance TORSTEN FRAZIER   03/12/18 1017    Significant Other Done Acceptance E VU,NR Educated pt on OT and POC. Educated on safety with bed mobility, t/f, mobility, and ADL. Encoruagement participation and engagement. Educated to call for assist.  03/10/18 0855     Done Acceptance E ESTELLA,NR Pt educated on role of OT and POC. Pt educated on benefit of activity, safety with bed mobility, and to use call light if she needs assistance and not to get up on her own. MR 03/09/18 1154                      User Key     Initials Effective Dates Name Provider Type Discipline     10/17/16 -  KORIN Cabral/L Occupational Therapist OT     03/07/18 -  NILTON Martin/L Occupational Therapy Assistant OT     03/07/18 -  Ashley Mccoy OT Occupational Therapist OT                  OT Recommendation and Plan  Therapy Frequency (OT Eval): other (see comments) (3-14x a week)  Plan of Care Review  Plan of Care Reviewed With:  patient, spouse  Plan of Care Reviewed With: patient, spouse  Outcome Summary: pt workded w/ OT required encouragement. fussing at   will need 24/7 @ at d'c        Outcome Measures     Row Name 03/12/18 0848 03/11/18 0845 03/10/18 0855       How much help from another person do you currently need...    Turning from your back to your side while in flat bed without using bedrails?  -- 2  -AM  --    Moving from lying on back to sitting on the side of a flat bed without bedrails?  -- 2  -AM  --    Moving to and from a bed to a chair (including a wheelchair)?  -- 1  -AM  --    Standing up from a chair using your arms (e.g., wheelchair, bedside chair)?  -- 2  -AM  --    Climbing 3-5 steps with a railing?  -- 1  -AM  --    To walk in hospital room?  -- 1  -AM  --    AM-PAC 6 Clicks Score  -- 9  -AM  --       How much help from another is currently needed...    Putting on and taking off regular lower body clothing? 1  -RC  -- 1  -BH    Bathing (including washing, rinsing, and drying) 2  -RC  -- 2  -BH    Toileting (which includes using toilet bed pan or urinal) 2  -RC  -- 2  -BH    Putting on and taking off regular upper body clothing 2  -RC  -- 2  -BH    Taking care of personal grooming (such as brushing teeth) 3  -RC  -- 3  -BH    Eating meals 3  -RC  -- 3  -BH    Score 13  -RC  -- 13  -BH       Functional Assessment    Outcome Measure Options  -- AM-PAC 6 Clicks Basic Mobility (PT)  -AM AM-PAC 6 Clicks Daily Activity (OT)  -    Row Name 03/09/18 1040 03/09/18 1039          How much help from another person do you currently need...    Turning from your back to your side while in flat bed without using bedrails?  -- 2  -CB     Moving from lying on back to sitting on the side of a flat bed without bedrails?  -- 2  -CB     Moving to and from a bed to a chair (including a wheelchair)?  -- 1  -CB     Standing up from a chair using your arms (e.g., wheelchair, bedside chair)?  -- 1  -CB     Climbing 3-5 steps with a  railing?  -- 1  -CB     To walk in hospital room?  -- 1  -CB     AM-PAC 6 Clicks Score  -- 8  -CB        How much help from another is currently needed...    Putting on and taking off regular lower body clothing? 1  -MR  --     Bathing (including washing, rinsing, and drying) 2  -MR  --     Toileting (which includes using toilet bed pan or urinal) 2  -MR  --     Putting on and taking off regular upper body clothing 2  -MR  --     Taking care of personal grooming (such as brushing teeth) 3  -MR  --     Eating meals 3  -MR  --     Score 13  -MR  --        Functional Assessment    Outcome Measure Options AM-PAC 6 Clicks Daily Activity (OT)  -MR AM-PAC 6 Clicks Basic Mobility (PT)  -CB       User Key  (r) = Recorded By, (t) = Taken By, (c) = Cosigned By    Initials Name Provider Type    CB Melanie Larose, PT Physical Therapist     Germaine Avila, OTR/L Occupational Therapist    AM Imer Diaz, PTA Physical Therapy Assistant     Belkys Steve CALLAWAY/L Occupational Therapy Assistant    MR Ashley Mccoy, OT Occupational Therapist           Time Calculation:         Time Calculation- OT     Row Name 03/12/18 1020             Time Calculation- OT    OT Start Time 0848  -RC      OT Stop Time 0945  -      OT Time Calculation (min) 57 min  -      Total Timed Code Minutes- OT 57 minute(s)  -      OT Received On 03/12/18  -        User Key  (r) = Recorded By, (t) = Taken By, (c) = Cosigned By    Initials Name Provider Type     Belkys Steve CALLAWAY/L Occupational Therapy Assistant           Therapy Charges for Today     Code Description Service Date Service Provider Modifiers Qty    60365607515 HC OT SELF CARE/MGMT/TRAIN EA 15 MIN 3/12/2018 Belkys [employer], CALLAWAY/L GO 2    52092234859 HC OT THER PROC EA 15 MIN 3/12/2018 [employer], CALLAWAY/L GO 1    93775075637 HC OT THERAPEUTIC ACT EA 15 MIN 3/12/2018 [employer], CALLAWAY/L GO 1          OT G-codes  OT Professional Judgement Used?: Yes  OT Functional  Scales Options: AM-PAC 6 Clicks Daily Activity (OT)  Score: 13  Functional Limitation: Self care  Self Care Current Status (): At least 60 percent but less than 80 percent impaired, limited or restricted  Self Care Goal Status (): At least 40 percent but less than 60 percent impaired, limited or restricted    NILTON Martin/LANDON  3/12/2018

## 2018-03-13 VITALS
DIASTOLIC BLOOD PRESSURE: 62 MMHG | WEIGHT: 127.65 LBS | SYSTOLIC BLOOD PRESSURE: 135 MMHG | BODY MASS INDEX: 23.49 KG/M2 | TEMPERATURE: 98.4 F | HEART RATE: 64 BPM | HEIGHT: 62 IN | OXYGEN SATURATION: 98 % | RESPIRATION RATE: 18 BRPM

## 2018-03-13 PROBLEM — N17.9 AKI (ACUTE KIDNEY INJURY) (HCC): Status: RESOLVED | Noted: 2018-03-06 | Resolved: 2018-03-13

## 2018-03-13 LAB
ANION GAP SERPL CALCULATED.3IONS-SCNC: 10 MMOL/L (ref 5–15)
BASOPHILS # BLD AUTO: 0.02 10*3/MM3 (ref 0–0.2)
BASOPHILS NFR BLD AUTO: 0.2 % (ref 0–2)
BUN BLD-MCNC: 59 MG/DL (ref 7–21)
BUN/CREAT SERPL: 30.7 (ref 7–25)
CALCIUM SPEC-SCNC: 10.9 MG/DL (ref 8.4–10.2)
CHLORIDE SERPL-SCNC: 94 MMOL/L (ref 95–110)
CO2 SERPL-SCNC: 32 MMOL/L (ref 22–31)
CREAT BLD-MCNC: 1.92 MG/DL (ref 0.5–1)
DEPRECATED RDW RBC AUTO: 54 FL (ref 36.4–46.3)
EOSINOPHIL # BLD AUTO: 0.37 10*3/MM3 (ref 0–0.7)
EOSINOPHIL NFR BLD AUTO: 3.8 % (ref 0–7)
ERYTHROCYTE [DISTWIDTH] IN BLOOD BY AUTOMATED COUNT: 17.8 % (ref 11.5–14.5)
GFR SERPL CREATININE-BSD FRML MDRD: 25 ML/MIN/1.73 (ref 60–90)
GLUCOSE BLD-MCNC: 191 MG/DL (ref 60–100)
GLUCOSE BLDC GLUCOMTR-MCNC: 212 MG/DL (ref 70–130)
GLUCOSE BLDC GLUCOMTR-MCNC: 216 MG/DL (ref 70–130)
GLUCOSE BLDC GLUCOMTR-MCNC: 256 MG/DL (ref 70–130)
GLUCOSE BLDC GLUCOMTR-MCNC: 276 MG/DL (ref 70–130)
HCT VFR BLD AUTO: 29.6 % (ref 35–45)
HGB BLD-MCNC: 9.5 G/DL (ref 12–15.5)
IMM GRANULOCYTES # BLD: 0.03 10*3/MM3 (ref 0–0.02)
IMM GRANULOCYTES NFR BLD: 0.3 % (ref 0–0.5)
INR PPP: 1.96 (ref 0.8–1.2)
LYMPHOCYTES # BLD AUTO: 1.62 10*3/MM3 (ref 0.6–4.2)
LYMPHOCYTES NFR BLD AUTO: 16.5 % (ref 10–50)
MCH RBC QN AUTO: 26.6 PG (ref 26.5–34)
MCHC RBC AUTO-ENTMCNC: 32.1 G/DL (ref 31.4–36)
MCV RBC AUTO: 82.9 FL (ref 80–98)
MONOCYTES # BLD AUTO: 0.74 10*3/MM3 (ref 0–0.9)
MONOCYTES NFR BLD AUTO: 7.5 % (ref 0–12)
NEUTROPHILS # BLD AUTO: 7.05 10*3/MM3 (ref 2–8.6)
NEUTROPHILS NFR BLD AUTO: 71.7 % (ref 37–80)
PLATELET # BLD AUTO: 307 10*3/MM3 (ref 150–450)
PMV BLD AUTO: 11 FL (ref 8–12)
POTASSIUM BLD-SCNC: 3.9 MMOL/L (ref 3.5–5.1)
PROTHROMBIN TIME: 21.6 SECONDS (ref 11.1–15.3)
RBC # BLD AUTO: 3.57 10*6/MM3 (ref 3.77–5.16)
SODIUM BLD-SCNC: 136 MMOL/L (ref 137–145)
WBC NRBC COR # BLD: 9.83 10*3/MM3 (ref 3.2–9.8)

## 2018-03-13 PROCEDURE — 97530 THERAPEUTIC ACTIVITIES: CPT

## 2018-03-13 PROCEDURE — 85610 PROTHROMBIN TIME: CPT | Performed by: HOSPITALIST

## 2018-03-13 PROCEDURE — 25010000002 CEFTRIAXONE PER 250 MG: Performed by: HOSPITALIST

## 2018-03-13 PROCEDURE — 82962 GLUCOSE BLOOD TEST: CPT

## 2018-03-13 PROCEDURE — 97535 SELF CARE MNGMENT TRAINING: CPT

## 2018-03-13 PROCEDURE — 94799 UNLISTED PULMONARY SVC/PX: CPT

## 2018-03-13 PROCEDURE — 63710000001 INSULIN ASPART PER 5 UNITS: Performed by: INTERNAL MEDICINE

## 2018-03-13 PROCEDURE — 25010000002 MORPHINE PER 10 MG: Performed by: HOSPITALIST

## 2018-03-13 PROCEDURE — 85025 COMPLETE CBC W/AUTO DIFF WBC: CPT | Performed by: HOSPITALIST

## 2018-03-13 PROCEDURE — 97110 THERAPEUTIC EXERCISES: CPT

## 2018-03-13 PROCEDURE — 80048 BASIC METABOLIC PNL TOTAL CA: CPT | Performed by: HOSPITALIST

## 2018-03-13 RX ORDER — WARFARIN SODIUM 2.5 MG/1
2.5 TABLET ORAL
Status: DISCONTINUED | OUTPATIENT
Start: 2018-03-15 | End: 2018-03-13 | Stop reason: HOSPADM

## 2018-03-13 RX ORDER — WARFARIN SODIUM 1 MG/1
1 TABLET ORAL
Status: DISCONTINUED | OUTPATIENT
Start: 2018-03-13 | End: 2018-03-13 | Stop reason: HOSPADM

## 2018-03-13 RX ADMIN — CEFTRIAXONE 1 G: 1 INJECTION, POWDER, FOR SOLUTION INTRAMUSCULAR; INTRAVENOUS at 00:29

## 2018-03-13 RX ADMIN — CARVEDILOL 12.5 MG: 12.5 TABLET, FILM COATED ORAL at 08:12

## 2018-03-13 RX ADMIN — ATORVASTATIN CALCIUM 10 MG: 10 TABLET, FILM COATED ORAL at 08:11

## 2018-03-13 RX ADMIN — ACETAMINOPHEN 650 MG: 325 TABLET ORAL at 15:03

## 2018-03-13 RX ADMIN — HYDROCODONE BITARTRATE AND ACETAMINOPHEN 1 TABLET: 7.5; 325 TABLET ORAL at 12:24

## 2018-03-13 RX ADMIN — ASPIRIN 81 MG: 81 TABLET, COATED ORAL at 08:12

## 2018-03-13 RX ADMIN — PANTOPRAZOLE SODIUM 40 MG: 40 TABLET, DELAYED RELEASE ORAL at 06:40

## 2018-03-13 RX ADMIN — MORPHINE SULFATE 1 MG: 2 INJECTION, SOLUTION INTRAMUSCULAR; INTRAVENOUS at 00:30

## 2018-03-13 RX ADMIN — INSULIN ASPART 5 UNITS: 100 INJECTION, SOLUTION INTRAVENOUS; SUBCUTANEOUS at 11:13

## 2018-03-13 RX ADMIN — NYSTATIN: 100000 POWDER TOPICAL at 08:12

## 2018-03-13 RX ADMIN — FUROSEMIDE 80 MG: 80 TABLET ORAL at 17:36

## 2018-03-13 RX ADMIN — CLONIDINE HYDROCHLORIDE 0.2 MG: 0.2 TABLET ORAL at 08:11

## 2018-03-13 RX ADMIN — WARFARIN SODIUM 1 MG: 1 TABLET ORAL at 17:35

## 2018-03-13 RX ADMIN — INSULIN ASPART 5 UNITS: 100 INJECTION, SOLUTION INTRAVENOUS; SUBCUTANEOUS at 08:11

## 2018-03-13 RX ADMIN — FUROSEMIDE 80 MG: 80 TABLET ORAL at 08:12

## 2018-03-13 RX ADMIN — LEVOTHYROXINE SODIUM 75 MCG: 75 TABLET ORAL at 08:11

## 2018-03-13 RX ADMIN — CARVEDILOL 12.5 MG: 12.5 TABLET, FILM COATED ORAL at 17:35

## 2018-03-13 RX ADMIN — INSULIN ASPART 8 UNITS: 100 INJECTION, SOLUTION INTRAVENOUS; SUBCUTANEOUS at 17:35

## 2018-03-13 NOTE — THERAPY TREATMENT NOTE
Acute Care - Physical Therapy Treatment Note  [facility]     Patient Name: Dorcas Bain  : 1940  MRN: [id number]  Today's Date: 3/13/2018     Date of Referral to PT: 18       Admit Date: 3/6/2018    Visit Dx:    ICD-10-CM ICD-9-CM   1. Impaired physical mobility Z74.09 781.99   2. Impaired mobility and activities of daily living Z74.09 799.89     Patient Active Problem List   Diagnosis   • MELODY (acute kidney injury)       Therapy Treatment    Therapy Treatment / Health Promotion    Treatment Time/Intention  Discipline: physical therapy assistant  Document Type: therapy note (daily note)  Subjective Information: no complaints  Mode of Treatment: individual therapy, physical therapy  Care Plan Review: care plan/treatment goals reviewed, risks/benefits reviewed, current/potential barriers reviewed, patient/other agree to care plan  Care Plan Review, Other Participant(s): spouse  Total Minutes, Physical Therapy Treatment: 40  Patient Effort: poor  Equipment Issued to Patient: gait belt  Plan of Care Review  Plan of Care Reviewed With: patient, spouse    Vitals/Pain/Safety  Vital Signs  Pre Systolic BP Rehab: 121  Pre Treatment Diastolic BP: 59  Post Systolic BP Rehab: 135  Post Treatment Diastolic BP: 63  Pretreatment Heart Rate (beats/min): 78  Posttreatment Heart Rate (beats/min): 90  Pre SpO2 (%): 97  O2 Delivery Pre Treatment: supplemental O2  Post SpO2 (%): 98  O2 Delivery Post Treatment: supplemental O2  Pre Patient Position: Supine  Intra Patient Position: Standing  Post Patient Position: Sitting  Pain Assessment  Additional Documentation: Pain Scale: Numbers Pre/Post-Treatment (Group)  Pain Scale: Numbers Pre/Post-Treatment  Pain Scale: Numbers, Pretreatment: 0/10 - no pain  Pain Scale: Numbers, Post-Treatment: 0/10 - no pain  Positioning and Restraints  Pre-Treatment Position: in bed  Post Treatment Position: chair  In Chair: reclined, call light within reach, encouraged to call for assist,  exit alarm on, with family/caregiver, legs elevated    Mobility,ADL,Motor, Modality  Bed Mobility Assessment/Treatment  Rolling Left [place] (Bed Mobility): not tested  Rolling Right [place] (Bed Mobility): not tested  Scooting/Bridging [place] (Bed Mobility): not tested  Supine-Sit [place] (Bed Mobility): maximum assist (25% patient effort)  Sit-Supine [place] (Bed Mobility): not tested  Bed Mobility, Safety Issues: decreased use of arms for pushing/pulling, decreased use of legs for bridging/pushing  Assistive Device (Bed Mobility): bed rails, draw sheet, head of bed elevated  Transfer Assessment/Treatment  Transfer Assessment/Treatment: bed-chair transfer, chair-bed transfer, sit-stand transfer, stand-sit transfer, stand pivot/stand step transfer  Maintains Weight-bearing Status (Transfers): able to maintain  Bed-Chair Transfer  Bed-Chair [place] (Transfers): maximum assist (25% patient effort), 2 person assist  Assistive Device (Bed-Chair Transfers): walker, front-wheeled  Chair-Bed Transfer  Chair-Bed [place] (Transfers): maximum assist (25% patient effort), 2 person assist  Assistive Device (Chair-Bed Transfers): walker, front-wheeled  Sit-Stand Transfer  Sit-Stand [place] (Transfers): maximum assist (25% patient effort), 2 person assist  Assistive Device (Sit-Stand Transfers): walker, front-wheeled  Stand-Sit Transfer  Stand-Sit [place] (Transfers): maximum assist (25% patient effort), 2 person assist  Assistive Device (Stand-Sit Transfers): walker, front-wheeled  Stand Pivot/Stand Step Transfer  Stand Pivot/Stand Step [place]: maximum assist (25% patient effort), 2 person assist  Assistive Device (Stand Pivot Stand Step Transfer): walker, front-wheeled  Gait/Stairs Assessment/Training  [place] Level (Gait): not tested  [place] Level (Stairs): not tested  [place] Level (Ramp): not tested     Therapeutic Exercise  Therapeutic Exercise: supine,  lower extremities  Additional Documentation: Therapeutic Exercise (Row)  Lower Extremity Supine Therapeutic Exercise  Performed, Supine Lower Extremity (Therapeutic Exercise): quadriceps sets, gluteal sets, ankle pumps, heel slides  Exercise Type, Supine Lower Extremity (Therapeutic Exercise): AAROM (active assistive range of motion)  Sets/Reps Detail, Supine Lower Extremity (Therapeutic Exercise): 1/20           ROM/MMT     General Assessment (Manual Muscle Testing)  General Manual Muscle Testing (MMT) Assessment: lower extremity strength deficits identified       Sensory, Edema, Orthotics          Cognition, Communication, Swallow  Cognitive Assessment/Intervention  Additional Documentation: Cognitive Assessment/Intervention (Group)  Cognitive Assessment/Intervention- PT/OT  Affect/Mental Status (Cognitive): WFL  Behavioral Issues (Cognitive): overwhelmed easily  Orientation Status (Cognition): oriented x 4  Follows Commands (Cognition): WFL  Cognitive Function (Cognitive): WFL  Personal Safety Interventions: gait belt, nonskid shoes/slippers when out of bed, supervised activity  Speaking Valve  Pretreatment Heart Rate (beats/min): 78  Pre SpO2 (%): 97  Posttreatment Heart Rate (beats/min): 90  Post SpO2 (%): 98  General Eating/Swallowing Observations  Pre SpO2 (%): 97  Post SpO2 (%): 98  Clinical Impression  Equipment Issued to Patient: gait belt    Outcome Summary  Outcome Summary/Treatment Plan (PT)  Daily Summary of Progress (PT): progress toward functional goals is gradual  Plan for Continued Treatment (PT): Gt/OOB  Anticipated Discharge Disposition (PT): skilled nursing facility (SNF)            PT Rehab Goals     Row Name 03/13/18 1040 03/12/18 1355 03/11/18 0845       Bed Mobility Goal 1 (PT)    Activity/Assistive Device (Bed Mobility Goal 1, PT) sit to supine/supine to sit  -AM sit to supine/supine to sit  -TW sit to supine;supine to sit  -AM    [place] Level/Cues Needed (Bed Mobility Goal 1, PT)  contact guard assist  -AM contact guard assist  -TW contact guard assist  -AM    Time Frame (Bed Mobility Goal 1, PT) 2 days;3 days  -AM 2 days;3 days  -TW 2 days;3 days  -AM    Barriers (Bed Mobility Goal 1, PT) HOB down and no rails, head propped with multiple pillows  -AM HOB downand no handrails, head propped with no pillows  -TW HOB down and no rails; head propped w/multiple pillows  -AM    Progress/Outcomes (Bed Mobility Goal 1, PT) goal not met  -AM progress slower than expected  -TW goal ongoing  -AM       Transfer Goal 1 (PT)    Activity/Assistive Device (Transfer Goal 1, PT) sit-to-stand/stand-to-sit;bed-to-chair/chair-to-bed;cane, straight;walker, rolling  -AM sit-to-stand/stand-to-sit;bed-to-chair/chair-to-bed;cane, straight;walker, rolling  -TW sit-to-stand/stand-to-sit;bed-to-chair/chair-to-bed;walker, rolling;cane, straight  -AM    [place] Level/Cues Needed (Transfer Goal 1, PT) minimum assist (75% or more patient effort)  -AM minimum assist (75% or more patient effort)  -TW minimum assist (75% or more patient effort)  -AM    Time Frame (Transfer Goal 1, PT) 2 - 3 days  -AM 2 - 3 days  -TW 2 - 3 days  -AM    Progress/Outcome (Transfer Goal 1, PT) goal not met  -AM progress slower than expected  -TW goal ongoing  -AM       Gait Training Goal 1 (PT)    Activity/Assistive Device (Gait Training Goal 1, PT) gait (walking locomotion);cane, straight;walker, rolling  -AM gait (walking locomotion);cane, straight;walker, rolling  -TW gait (walking locomotion);walker, rolling;cane, straight  -AM    [place] Level (Gait Training Goal 1, PT) contact guard assist  -AM contact guard assist  -TW contact guard assist  -AM    Distance (Gait Goal 1, PT) 100 ft  -'  -  -AM    Time Frame (Gait Training Goal 1, PT) by discharge  -AM by discharge  -TW by discharge  -AM    Progress/Outcome (Gait Training Goal 1, PT) goal not met  -AM progress slower than expected  -TW goal ongoing  -AM       Strength  Goal 1 (PT)    Strength Goal 1 (PT) 20 reps all ex BLE AROM, For pt to be able to get BLE up onto bed Independently   -AM 20 reps all ex BLE AROM. For pt to be able to get BLE up onto bed ind  -TW 20 reps all ex BLE AROM. For pt to be able to get BLE up onto bed Ind  -AM    Time Frame (Strength Goal 1, PT) 2 days;3 days  -AM 2 days;3 days  -TW 2 days;3 days  -AM    Progress/Outcome (Strength Goal 1, PT) goal not met  -AM progress slower than expected  -TW goal ongoing  -AM    Row Name 03/10/18 1300             Bed Mobility Goal 1 (PT)    Activity/Assistive Device (Bed Mobility Goal 1, PT) sit to supine/supine to sit  -SM      [place] Level/Cues Needed (Bed Mobility Goal 1, PT) contact guard assist  -SM      Time Frame (Bed Mobility Goal 1, PT) 2 days;3 days  -SM      Barriers (Bed Mobility Goal 1, PT) HOB down and no rails, head propped with multiple pillows  -SM      Progress/Outcomes (Bed Mobility Goal 1, PT) goal ongoing  -SM         Transfer Goal 1 (PT)    Activity/Assistive Device (Transfer Goal 1, PT) bed-to-chair/chair-to-bed;sit-to-stand/stand-to-sit;walker, rolling;cane, straight  -SM      [place] Level/Cues Needed (Transfer Goal 1, PT) minimum assist (75% or more patient effort)  -SM      Time Frame (Transfer Goal 1, PT) 2 - 3 days  -SM      Progress/Outcome (Transfer Goal 1, PT) goal ongoing  -SM         Gait Training Goal 1 (PT)    Activity/Assistive Device (Gait Training Goal 1, PT) gait (walking locomotion);walker, rolling;cane, straight  -SM      [place] Level (Gait Training Goal 1, PT) contact guard assist  -SM      Distance (Gait Goal 1, PT) 100'  -SM      Time Frame (Gait Training Goal 1, PT) by discharge  -SM      Progress/Outcome (Gait Training Goal 1, PT) goal ongoing  -SM         Strength Goal 1 (PT)    Strength Goal 1 (PT) 20 reps all ex BLE AROM, For pt to be able to get BLE up onto bed  Independently   -SM      Time Frame (Strength Goal 1, PT) 2 days;3 days  -SM       Progress/Outcome (Strength Goal 1, PT) goal ongoing  -        User Key  (r) = Recorded By, (t) = Taken By, (c) = Cosigned By    Initials Name Provider Type    AM Imer Diaz, [facility] Physical Therapy Assistant     Merna Mehta, [facility] Physical Therapy Assistant     Kael Arriaga, [facility] Physical Therapy Assistant          Physical Therapy Education     Title: PT OT SLP Therapies (Active)     Topic: Physical Therapy (Active)     Point: Mobility training (Active)    Learning Progress Summary     Learner Status Readiness Method Response Comment Documented by    Patient Active Acceptance E,TB,D NR pt and spouse educated on safety and use of gait belt. Spouse able to demonstrate proper use of belt to assist pt at home  03/10/18 1334     Active Acceptance D NR   03/09/18 1157    Family Active Acceptance D NR   03/09/18 1157    Significant Other Active Acceptance E,TB,D NR pt and spouse educated on safety and use of gait belt. Spouse able to demonstrate proper use of belt to assist pt at home  03/10/18 1334          Point: Precautions (Active)    Learning Progress Summary     Learner Status Readiness Method Response Comment Documented by    Patient Active Acceptance D NR   03/09/18 1157    Family Active Acceptance D NR   03/09/18 1157                      User Key     Initials Effective Dates Name Provider Type Discipline     04/06/17 -  Melanie Larose, PT Physical Therapist PT     03/07/18 -  Merna Mehta, [facility] Physical Therapy Assistant PT                    PT Recommendation and Plan  Anticipated Discharge Disposition (PT): skilled nursing facility (SNF)  Therapy Frequency (PT Clinical Impression): daily  Daily Summary of Progress (PT): progress toward functional goals is gradual  Plan for Continued Treatment (PT): Gt/OOB  Plan of Care Reviewed With: patient, spouse  Outcome Summary: Pt did not meet any PT goals this tx. Pt very upset about being here at hospital and upset with . Pt  needed [facility] to get EOB. Pt unable to stand on first attempt w/shoe boots on. Pt able to stand w/Max A 2 w/socks. Pt unable to take any steps. Pt declined stand pivot to recliner. Pt needs SNF placement.           Outcome Measures     Row Name 03/13/18 1040 03/12/18 1355 03/12/18 0848       How much help from another person do you currently need...    Turning from your back to your side while in flat bed without using bedrails? 2  -AM 2  -TW  --    Moving from lying on back to sitting on the side of a flat bed without bedrails? 2  -AM 2  -TW  --    Moving to and from a bed to a chair (including a wheelchair)? 2  -AM 1  -TW  --    Standing up from a chair using your arms (e.g., wheelchair, bedside chair)? 2  -AM 2  -TW  --    Climbing 3-5 steps with a railing? 1  -AM 1  -TW  --    To walk in hospital room? 1  -AM 1  -TW  --    AM-PAC 6 Clicks Score 10  -AM 9  -TW  --       How much help from another is currently needed...    Putting on and taking off regular lower body clothing?  --  -- 1  -RC    Bathing (including washing, rinsing, and drying)  --  -- 2  -RC    Toileting (which includes using toilet bed pan or urinal)  --  -- 2  -RC    Putting on and taking off regular upper body clothing  --  -- 2  -RC    Taking care of personal grooming (such as brushing teeth)  --  -- 3  -RC    Eating meals  --  -- 3  -RC    Score  --  -- 13  -RC       Functional Assessment    Outcome Measure Options AM-PAC 6 Clicks Basic Mobility (PT)  -AM AM-PAC 6 Clicks Basic Mobility (PT)  -TW  --    Row Name 03/11/18 0845             How much help from another person do you currently need...    Turning from your back to your side while in flat bed without using bedrails? 2  -AM      Moving from lying on back to sitting on the side of a flat bed without bedrails? 2  -AM      Moving to and from a bed to a chair (including a wheelchair)? 1  -AM      Standing up from a chair using your arms (e.g., wheelchair, bedside chair)? 2  -AM      Climbing  3-5 steps with a railing? 1  -AM      To walk in hospital room? 1  -AM      AM-PAC 6 Clicks Score 9  -AM         Functional Assessment    Outcome Measure Options AM-PAC 6 Clicks Basic Mobility (PT)  -AM        User Key  (r) = Recorded By, (t) = Taken By, (c) = Cosigned By    Initials Name Provider Type    AM Imer Diaz PTA Physical Therapy Assistant    ROEL Arriaga PTA Physical Therapy Assistant    NILTON Yip/L Occupational Therapy Assistant           Time Calculation:         PT Charges     Row Name 03/13/18 1040             Time Calculation    Start Time 1040  -AM      Stop Time 1120  -AM      Time Calculation (min) 40 min  -AM      PT Received On 03/13/18  -AM      PT - Next Appointment 03/14/18  -AM         Time Calculation- PT    Total Timed Code Minutes- PT 40 minute(s)  -AM        User Key  (r) = Recorded By, (t) = Taken By, (c) = Cosigned By    Initials Name Provider Type    AM Imer Diaz PTA Physical Therapy Assistant          Therapy Charges for Today     Code Description Service Date Service Provider Modifiers Qty    86224363183 HC PT THER PROC EA 15 MIN 3/13/2018 Imer Diaz PTA GP 2    71005452471 HC PT SELF CARE/MGMT/TRAIN EA 15 MIN 3/13/2018 Imer Diaz PTA GP 1          PT G-Codes  PT Professional Judgement Used?: Yes  Outcome Measure Options: AM-PAC 6 Clicks Basic Mobility (PT)  Score: 8  Functional Limitation: Mobility: Walking and moving around  Mobility: Walking and Moving Around Current Status (): At least 80 percent but less than 100 percent impaired, limited or restricted  Mobility: Walking and Moving Around Goal Status (): At least 60 percent but less than 80 percent impaired, limited or restricted    Imer Diaz PTA  3/13/2018

## 2018-03-13 NOTE — THERAPY TREATMENT NOTE
Inpatient Rehabilitation - Occupational Therapy Treatment Note  [facility]     Patient Name: Dorcas Bain  : 1940  MRN: [id number]  Today's Date: 3/13/2018     Date of Referral to OT: 18       Admit Date: 3/6/2018       ICD-10-CM ICD-9-CM   1. Impaired physical mobility Z74.09 781.99   2. Impaired mobility and activities of daily living Z74.09 799.89     Patient Active Problem List   Diagnosis   (none) - all problems resolved or deleted     Past Medical History:   Diagnosis Date   • CHF (congestive heart failure)    • Diabetes mellitus    • GERD (gastroesophageal reflux disease)    • Hypercholesteremia    • Hypertension    • S/P CABG (coronary artery bypass graft)      Past Surgical History:   Procedure Laterality Date   • CORONARY ARTERY BYPASS GRAFT     • RENAL ARTERY STENT         Therapy Treatment    Therapy Treatment / Health Promotion    Treatment Time/Intention  Discipline: occupational therapy assistant  Document Type: therapy note (daily note)  Subjective Information: no complaints  Mode of Treatment: individual therapy, occupational therapy  Care Plan Review: care plan/treatment goals reviewed  Care Plan Review, Other Participant(s): caregiver  Total Minutes, Physical Therapy Treatment: 24  Patient Effort: poor  Plan of Care Review  Plan of Care Reviewed With: patient    Vitals/Pain/Safety  Vital Signs  Pre Patient Position: Supine  Post Patient Position: Supine    Mobility,ADL,Motor, Modality  Bed Mobility Assessment/Treatment  Rolling Right [place] (Bed Mobility): minimum assist (75% patient effort), 2 person assist (to use bed pan )     Upper Extremity Supine Therapeutic Exercise  Performed, Supine Upper Extremity (Therapeutic Exercise): shoulder flexion/extension, shoulder abduction/adduction, shoulder horizontal abduction/adduction, shoulder/scapular protraction/retraction, elbow flexion/extension (min on each ex with prom to aarom )           ROM/MMT             Sensory,  Edema, Orthotics          Cognition, Communication, Swallow       Outcome Summary  Rehab Outcome Summary/Treatment Plan  Daily Summary of Progress (OT): progress toward functional goals is gradual  Barriers to Overall Progress (OT):  (fatigue )        OT Rehab Goals     Row Name 03/13/18 1040 03/12/18 1000 03/10/18 0855       Bed Mobility Goal 1 (OT)    Activity/Assistive Device (Bed Mobility Goal 1, OT) bed mobility activities, all  -LM bed mobility activities, all  -RC bed mobility activities, all  -BH    [place] Level/Cues Needed (Bed Mobility Goal 1, OT) supervision required  -LM supervision required  -RC supervision required  -BH    Time Frame (Bed Mobility Goal 1, OT) by discharge  -LM by discharge  -RC by discharge  -    Barriers (Bed Mobility Goal 1, OT)  --  -- pt required encouragment and has increased pain with movement.   -    Progress/Outcomes (Bed Mobility Goal 1, OT) continuing progress toward goal;goal ongoing  -LM continuing progress toward goal;goal not met  -RC goal ongoing;continuing progress toward goal  -BH       Transfer Goal 1 (OT)    Activity/Assistive Device (Transfer Goal 1, OT) transfers, all  -LM transfers, all  -RC transfers, all;walker, rolling;commode, bedside with drop arms  -    [place] Level/Cues Needed (Transfer Goal 1, OT) contact guard assist  -LM contact guard assist  -RC contact guard assist  -BH    Time Frame (Transfer Goal 1, OT) by discharge  -LM by discharge  -RC by discharge  -BH    Barriers (Transfers Goal 1, OT)  --  -- pain limits pt, inconsistent with sit to stand.   -    Progress/Outcome (Transfer Goal 1, OT) goal not met;continuing progress toward goal  -LM goal not met;continuing progress toward goal  -RC goal ongoing  -       Problem Specific Goal 1 (OT)    Problem Specific Goal 1 (OT) Pt. will complete dynamic sitting balance with UE support with cond I for 10 min sitting EOB to complete ADLS.  -LM Pt. will complete dynamic sitting balance  with UE support with cond I for 10 min sitting EOB to complete ADLS.  -RC Pt. will complete dynamic sitting balance with UE support with cond I for 10 min sitting EOB to complete ADLS.  -BH    Time Frame (Problem Specific Goal 1, OT) by discharge  -LM by discharge  -RC by discharge  -    Progress/Outcome (Problem Specific Goal 1, OT) continuing progress toward goal  -LM continuing progress toward goal;goal not met  - goal ongoing;continuing progress toward goal  -BH       Problem Specific Goal 2 (OT)    Problem Specific Goal 2 (OT) Pt. will complete all UB ADL with Min A, LB ADL Mod A, and grooming with set-up/cond I  -LM Pt. will complete dynamic sitting balance with UE support with cond I for 10 min sitting EOB to complete ADLS.  -RC Pt. will complete all UB ADL with Min A, LB ADL Mod A, and grooming with set-up/cond I  -BH    Time Frame (Problem Specific Goal 2, OT) by discharge  -LM by discharge  -RC by discharge  -    Progress/Outcome (Problem Specific Goal 2, OT) goal ongoing;continuing progress toward goal  -LM continuing progress toward goal;goal not met  - goal ongoing;continuing progress toward goal  -BH    Row Name 03/10/18 0600             Bed Mobility Goal 1 (OT)    Activity/Assistive Device (Bed Mobility Goal 1, OT) bed mobility activities, all  -NN      [place] Level/Cues Needed (Bed Mobility Goal 1, OT) supervision required  -NN      Time Frame (Bed Mobility Goal 1, OT) by discharge  -NN      Progress/Outcomes (Bed Mobility Goal 1, OT) goal ongoing  -NN         Transfer Goal 1 (OT)    Activity/Assistive Device (Transfer Goal 1, OT) transfers, all;walker, rolling;commode, bedside with drop arms  -NN      [place] Level/Cues Needed (Transfer Goal 1, OT) contact guard assist  -NN      Time Frame (Transfer Goal 1, OT) by discharge  -NN      Progress/Outcome (Transfer Goal 1, OT) goal ongoing  -NN         Problem Specific Goal 1 (OT)    Problem Specific Goal 1 (OT) Pt. will complete  dynamic sitting balance with UE support with cond I for 10 min sitting EOB to complete ADLS.  -NN      Time Frame (Problem Specific Goal 1, OT) by discharge  -NN      Progress/Outcome (Problem Specific Goal 1, OT) goal ongoing  -NN         Problem Specific Goal 2 (OT)    Problem Specific Goal 2 (OT) Pt. will complete all UB ADL with Min A, LB ADL Mod A, and grooming with set-up/cond I  -NN      Time Frame (Problem Specific Goal 2, OT) by discharge  -NN      Progress/Outcome (Problem Specific Goal 2, OT) goal ongoing  -NN        User Key  (r) = Recorded By, (t) = Taken By, (c) = Cosigned By    Initials Name Provider Type     Germaine Avila, OTR/L Occupational Therapist     Belkys Steve, CALLAWAY/L Occupational Therapy Assistant     Sara Bush, CALLAWAY/L Occupational Therapy Assistant    RACHELL Cobos, OTR/L Occupational Therapist        Occupational Therapy Education     Title: PT OT SLP Therapies (Active)     Topic: Occupational Therapy (Active)     Point: ADL training (Active)     Description: Instruct learner(s) on proper safety adaptation and remediation techniques during self care or transfers.   Instruct in proper use of assistive devices.   Learning Progress Summary     Learner Status Readiness Method Response Comment Documented by    Patient Active Acceptance E NR   03/13/18 1530     Active Acceptance E,D NR   03/12/18 1017     Done Acceptance E VU,NR Educated pt on OT and POC. Educated on safety with bed mobility, t/f, mobility, and ADL. Encoruagement participation and engagement. Educated to call for assist.  03/10/18 0855    Family Active Acceptance E NR   03/13/18 1530     Active Acceptance E,D NR   03/12/18 1017    Significant Other Done Acceptance E VU,NR Educated pt on OT and POC. Educated on safety with bed mobility, t/f, mobility, and ADL. Encoruagement participation and engagement. Educated to call for assist.  03/10/18 0855          Point: Home exercise program (Active)      Description: Instruct learner(s) on appropriate technique for monitoring, assisting and/or progressing therapeutic exercises/activities.   Learning Progress Summary     Learner Status Readiness Method Response Comment Documented by    Patient Active Acceptance E NR   03/13/18 1530    Family Active Acceptance E NR   03/13/18 1530          Point: Precautions (Active)     Description: Instruct learner(s) on prescribed precautions during self-care and functional transfers.   Learning Progress Summary     Learner Status Readiness Method Response Comment Documented by    Patient Active Acceptance E NR   03/13/18 1530     Active Acceptance E,D NR   03/12/18 1017     Done Acceptance E VU,NR Educated pt on OT and POC. Educated on safety with bed mobility, t/f, mobility, and ADL. Encoruagement participation and engagement. Educated to call for assist.  03/10/18 0855     Done Acceptance E VU,NR Pt educated on role of OT and POC. Pt educated on benefit of activity, safety with bed mobility, and to use call light if she needs assistance and not to get up on her own. MR 03/09/18 1154    Family Active Acceptance E NR   03/13/18 1530     Active Acceptance E,D NR   03/12/18 1017    Significant Other Done Acceptance E VU,NR Educated pt on OT and POC. Educated on safety with bed mobility, t/f, mobility, and ADL. Encoruagement participation and engagement. Educated to call for assist.  03/10/18 0855     Done Acceptance E VU,NR Pt educated on role of OT and POC. Pt educated on benefit of activity, safety with bed mobility, and to use call light if she needs assistance and not to get up on her own. MR 03/09/18 1154          Point: Body mechanics (Active)     Description: Instruct learner(s) on proper positioning and spine alignment during self-care, functional mobility activities and/or exercises.   Learning Progress Summary     Learner Status Readiness Method Response Comment Documented by    Patient Active Acceptance  E NR   03/13/18 1530     Active Acceptance E,D NR   03/12/18 1017     Done Acceptance E VU,NR Educated pt on OT and POC. Educated on safety with bed mobility, t/f, mobility, and ADL. Encoruagement participation and engagement. Educated to call for assist.  03/10/18 0855     Done Acceptance E VU,NR Pt educated on role of OT and POC. Pt educated on benefit of activity, safety with bed mobility, and to use call light if she needs assistance and not to get up on her own. MR 03/09/18 1154    Family Active Acceptance E NR   03/13/18 1530     Active Acceptance E,D NR   03/12/18 1017    Significant Other Done Acceptance E VU,NR Educated pt on OT and POC. Educated on safety with bed mobility, t/f, mobility, and ADL. Encoruagement participation and engagement. Educated to call for assist.  03/10/18 0855     Done Acceptance E VU,NR Pt educated on role of OT and POC. Pt educated on benefit of activity, safety with bed mobility, and to use call light if she needs assistance and not to get up on her own. MR 03/09/18 1154                      User Key     Initials Effective Dates Name Provider Type Discipline     10/17/16 -  Germaine Avila OTBOBO/L Occupational Therapist OT     03/07/18 -  Belkys Steve CALLAWAY/L Occupational Therapy Assistant OT     03/07/18 -  Sara Bush CALLAWAY/L Occupational Therapy Assistant OT     03/07/18 -  Ashley Mccoy, OT Occupational Therapist OT                  OT Recommendation and Plan  Therapy Frequency (OT Eval): other (see comments) (3-14x a week)  Daily Summary of Progress (OT): progress toward functional goals is gradual  Plan of Care Review  Plan of Care Reviewed With: patient  Plan of Care Reviewed With: patient  Outcome Summary: slow improvement this pm secondary to fatigue  pt agreeed to in sup ex although unablet to stay awake  perf prom aarom and rolling in sup to be placed on bed pan         Outcome Measures     Row Name 03/13/18 1500 03/13/18 1345 03/13/18  1040       How much help from another person do you currently need...    Turning from your back to your side while in flat bed without using bedrails?  -- 2  -AM 2  -AM    Moving from lying on back to sitting on the side of a flat bed without bedrails?  -- 2  -AM 2  -AM    Moving to and from a bed to a chair (including a wheelchair)?  -- 2  -AM 2  -AM    Standing up from a chair using your arms (e.g., wheelchair, bedside chair)?  -- 2  -AM 2  -AM    Climbing 3-5 steps with a railing?  -- 1  -AM 1  -AM    To walk in hospital room?  -- 1  -AM 1  -AM    AM-PAC 6 Clicks Score  -- 10  -AM 10  -AM       How much help from another is currently needed...    Putting on and taking off regular lower body clothing? 1  -LM  --  --    Bathing (including washing, rinsing, and drying) 2  -LM  --  --    Toileting (which includes using toilet bed pan or urinal) 2  -LM  --  --    Putting on and taking off regular upper body clothing 2  -LM  --  --    Taking care of personal grooming (such as brushing teeth) 3  -LM  --  --    Eating meals 3  -LM  --  --    Score 13  -LM  --  --       Functional Assessment    Outcome Measure Options  -- AM-PAC 6 Clicks Basic Mobility (PT)  -AM AM-PAC 6 Clicks Basic Mobility (PT)  -AM    Row Name 03/12/18 1355 03/12/18 0848 03/11/18 0845       How much help from another person do you currently need...    Turning from your back to your side while in flat bed without using bedrails? 2  -TW  -- 2  -AM    Moving from lying on back to sitting on the side of a flat bed without bedrails? 2  -TW  -- 2  -AM    Moving to and from a bed to a chair (including a wheelchair)? 1  -TW  -- 1  -AM    Standing up from a chair using your arms (e.g., wheelchair, bedside chair)? 2  -TW  -- 2  -AM    Climbing 3-5 steps with a railing? 1  -TW  -- 1  -AM    To walk in hospital room? 1  -TW  -- 1  -AM    AM-PAC 6 Clicks Score 9  -TW  -- 9  -AM       How much help from another is currently needed...    Putting on and taking off  regular lower body clothing?  -- 1  -RC  --    Bathing (including washing, rinsing, and drying)  -- 2  -RC  --    Toileting (which includes using toilet bed pan or urinal)  -- 2  -RC  --    Putting on and taking off regular upper body clothing  -- 2  -RC  --    Taking care of personal grooming (such as brushing teeth)  -- 3  -RC  --    Eating meals  -- 3  -RC  --    Score  -- 13  -RC  --       Functional Assessment    Outcome Measure Options AM-PAC 6 Clicks Basic Mobility (PT)  -TW  -- AM-PAC 6 Clicks Basic Mobility (PT)  -AM      User Key  (r) = Recorded By, (t) = Taken By, (c) = Cosigned By    Initials Name Provider Type    AM Imer Diaz PTA Physical Therapy Assistant    TW Kael Arriaga PTA Physical Therapy Assistant    RC NILTON Martin/L Occupational Therapy Assistant    NILTON Sanchez/L Occupational Therapy Assistant           Time Calculation:         Time Calculation- OT     Row Name 03/13/18 1510             Time Calculation- OT    OT Start Time 1436  -LM      OT Stop Time 1500  -LM      OT Time Calculation (min) 24 min  -LM      Total Timed Code Minutes- OT 24 minute(s)  -LM      OT Received On 03/13/18  -        User Key  (r) = Recorded By, (t) = Taken By, (c) = Cosigned By    Initials Name Provider Type     NILTON Escobedo/L Occupational Therapy Assistant           Therapy Charges for Today     Code Description Service Date Service Provider Modifiers Qty    [id number]  OT THERAPEUTIC ACT EA 15 MIN 3/13/2018 NILTON Escobedo/L  1    42602693726 HC OT THER PROC EA 15 MIN 3/13/2018 СЕРГЕЙ Escobedo  1          OT G-codes  OT Professional Judgement Used?: Yes  OT Functional Scales Options: AM-PAC 6 Clicks Daily Activity (OT)  Score: 13  Functional Limitation: Self care  Self Care Current Status (): At least 60 percent but less than 80 percent impaired, limited or restricted  Self Care Goal Status (): At least 40 percent but less than 60  percent impaired, limited or restricted    Sara Bush, CALLAWAY/[place]  3/13/2018

## 2018-03-13 NOTE — PLAN OF CARE
Problem: Patient Care Overview  Goal: Plan of Care Review  Outcome: Ongoing (interventions implemented as appropriate)   03/13/18 0741   Coping/Psychosocial   Plan of Care Reviewed With patient   OTHER   Outcome Summary slow improvement this pm secondary to fatigue pt agreeed to in sup ex although unablet to stay awake perf prom aarom and rolling in sup to be placed on bed pan

## 2018-03-13 NOTE — PLAN OF CARE
Problem: Fall Risk (Adult)  Goal: Identify Related Risk Factors and Signs and Symptoms  Outcome: Ongoing (interventions implemented as appropriate)    Goal: Absence of Fall  Outcome: Ongoing (interventions implemented as appropriate)    Goal: Absence of Fall  Outcome: Ongoing (interventions implemented as appropriate)      Problem: Patient Care Overview  Goal: Plan of Care Review  Outcome: Ongoing (interventions implemented as appropriate)   03/13/18 0152   Coping/Psychosocial   Plan of Care Reviewed With patient   Plan of Care Review   Progress no change   OTHER   Outcome Summary Patient has been very argumentative and frequent complaints of pain     Goal: Individualization and Mutuality  Outcome: Ongoing (interventions implemented as appropriate)      Problem: Renal Failure/Kidney Injury, Acute (Adult)  Goal: Signs and Symptoms of Listed Potential Problems Will be Absent, Minimized or Managed (Renal Failure/Kidney Injury, Acute)  Outcome: Ongoing (interventions implemented as appropriate)      Problem: Wound (Includes Pressure Injury) (Adult)  Goal: Signs and Symptoms of Listed Potential Problems Will be Absent, Minimized or Managed (Wound)  Outcome: Ongoing (interventions implemented as appropriate)      Problem: Pain, Chronic (Adult)  Goal: Acceptable Pain/Comfort Level and Functional Ability  Outcome: Ongoing (interventions implemented as appropriate)

## 2018-03-13 NOTE — PLAN OF CARE
Problem: Patient Care Overview  Goal: Plan of Care Review  Outcome: Ongoing (interventions implemented as appropriate)   03/13/18 1040   Coping/Psychosocial   Plan of Care Reviewed With patient;spouse   OTHER   Outcome Summary Pt did not meet any PT goals this tx. Pt able to t/f to EOB Max A. Pt stood X3 attempts but unable to take steps. Pt able to complete stand pivot Max A2 to recliner. Pt would benefit from SNF placement.

## 2018-03-13 NOTE — THERAPY TREATMENT NOTE
Acute Care - Physical Therapy Treatment Note  [facility]     Patient Name: Dorcas Bain  : 1940  MRN: [id number]  Today's Date: 3/13/2018     Date of Referral to PT: 18       Admit Date: 3/6/2018    Visit Dx:    ICD-10-CM ICD-9-CM   1. Impaired physical mobility Z74.09 781.99   2. Impaired mobility and activities of daily living Z74.09 799.89     Patient Active Problem List   Diagnosis   (none) - all problems resolved or deleted       Therapy Treatment    Therapy Treatment / Health Promotion    Treatment Time/Intention  Discipline: physical therapy assistant  Document Type: therapy note (daily note)  Subjective Information: no complaints  Mode of Treatment: individual therapy, physical therapy  Care Plan Review: care plan/treatment goals reviewed, risks/benefits reviewed, current/potential barriers reviewed, patient/other agree to care plan  Care Plan Review, Other Participant(s): caregiver  Total Minutes, Physical Therapy Treatment: 15  Patient Effort: poor  Existing Precautions/Restrictions: fall  Equipment Issued to Patient: gait belt  Plan of Care Review  Plan of Care Reviewed With: patient, spouse    Vitals/Pain/Safety  Vital Signs  Pre Systolic BP Rehab: 121  Pre Treatment Diastolic BP: 59  Post Systolic BP Rehab: 135  Post Treatment Diastolic BP: 63  Pretreatment Heart Rate (beats/min): 78  Posttreatment Heart Rate (beats/min): 90  Pre SpO2 (%): 97  O2 Delivery Pre Treatment: supplemental O2  Post SpO2 (%): 98  O2 Delivery Post Treatment: supplemental O2  Pre Patient Position: Sitting  Intra Patient Position: Standing  Post Patient Position: Supine  Pain Assessment  Additional Documentation: Pain Scale: Numbers Pre/Post-Treatment (Group)  Pain Scale: Numbers Pre/Post-Treatment  Pain Scale: Numbers, Pretreatment: 0/10 - no pain  Pain Scale: Numbers, Post-Treatment: 0/10 - no pain  Positioning and Restraints  Pre-Treatment Position: sitting in chair/recliner  Post Treatment Position: bed  In  Bed: supine, call light within reach, encouraged to call for assist, exit alarm on, with family/caregiver  In Chair: reclined, call light within reach, encouraged to call for assist, exit alarm on, with family/caregiver, legs elevated    Mobility,ADL,Motor, Modality  Bed Mobility Assessment/Treatment  Bed Mobility Assessment/Treatment: sit-supine  Rolling Left [place] (Bed Mobility): not tested  Rolling Right [place] (Bed Mobility): not tested  Scooting/Bridging [place] (Bed Mobility): not tested  Supine-Sit [place] (Bed Mobility): not tested  Sit-Supine [place] (Bed Mobility): maximum assist (25% patient effort), 2 person assist  Bed Mobility, Safety Issues: decreased use of arms for pushing/pulling, decreased use of legs for bridging/pushing  Assistive Device (Bed Mobility): bed rails, draw sheet, head of bed elevated  Transfer Assessment/Treatment  Transfer Assessment/Treatment: bed-chair transfer, chair-bed transfer, sit-stand transfer, stand-sit transfer, stand pivot/stand step transfer  Maintains Weight-bearing Status (Transfers): able to maintain  Bed-Chair Transfer  Bed-Chair [place] (Transfers): maximum assist (25% patient effort), 2 person assist  Assistive Device (Bed-Chair Transfers): walker, front-wheeled  Chair-Bed Transfer  Chair-Bed [place] (Transfers): maximum assist (25% patient effort), 2 person assist  Assistive Device (Chair-Bed Transfers): walker, front-wheeled  Sit-Stand Transfer  Sit-Stand [place] (Transfers): maximum assist (25% patient effort), 2 person assist  Assistive Device (Sit-Stand Transfers): walker, front-wheeled  Stand-Sit Transfer  Stand-Sit [place] (Transfers): maximum assist (25% patient effort), 2 person assist  Assistive Device (Stand-Sit Transfers): walker, front-wheeled  Stand Pivot/Stand Step Transfer  Stand Pivot/Stand Step [place]: maximum assist (25% patient effort), 2 person assist  Assistive Device (Stand Pivot Stand  Step Transfer): walker, front-wheeled  Gait/Stairs Assessment/Training  [place] Level (Gait): not tested  [place] Level (Stairs): not tested  [place] Level (Ramp): not tested     Therapeutic Exercise  Therapeutic Exercise: supine, lower extremities  Additional Documentation: Therapeutic Exercise (Row)  Lower Extremity Supine Therapeutic Exercise  Performed, Supine Lower Extremity (Therapeutic Exercise): quadriceps sets, gluteal sets, ankle pumps, heel slides  Exercise Type, Supine Lower Extremity (Therapeutic Exercise): AAROM (active assistive range of motion)  Sets/Reps Detail, Supine Lower Extremity (Therapeutic Exercise): 1/20           ROM/MMT     General Assessment (Manual Muscle Testing)  General Manual Muscle Testing (MMT) Assessment: lower extremity strength deficits identified       Sensory, Edema, Orthotics          Cognition, Communication, Swallow  Cognitive Assessment/Intervention  Additional Documentation: Cognitive Assessment/Intervention (Group)  Cognitive Assessment/Intervention- PT/OT  Affect/Mental Status (Cognitive): WFL  Behavioral Issues (Cognitive): overwhelmed easily  Orientation Status (Cognition): oriented x 4  Follows Commands (Cognition): WFL  Cognitive Function (Cognitive): WFL  Personal Safety Interventions: gait belt, nonskid shoes/slippers when out of bed, supervised activity  Speaking Valve  Pretreatment Heart Rate (beats/min): 78  Pre SpO2 (%): 97  Posttreatment Heart Rate (beats/min): 90  Post SpO2 (%): 98  General Eating/Swallowing Observations  Pre SpO2 (%): 97  Post SpO2 (%): 98  Clinical Impression  Equipment Issued to Patient: gait belt    Outcome Summary  Outcome Summary/Treatment Plan (PT)  Daily Summary of Progress (PT): progress toward functional goals is gradual  Plan for Continued Treatment (PT): Gt/OOB  Anticipated Discharge Disposition (PT): skilled nursing facility (SNF)            PT Rehab Goals     Row Name 03/13/18 1040 03/12/18 1355 03/11/18 0845        Bed Mobility Goal 1 (PT)    Activity/Assistive Device (Bed Mobility Goal 1, PT) sit to supine/supine to sit  -AM sit to supine/supine to sit  -TW sit to supine;supine to sit  -AM    [place] Level/Cues Needed (Bed Mobility Goal 1, PT) contact guard assist  -AM contact guard assist  -TW contact guard assist  -AM    Time Frame (Bed Mobility Goal 1, PT) 2 days;3 days  -AM 2 days;3 days  -TW 2 days;3 days  -AM    Barriers (Bed Mobility Goal 1, PT) HOB down and no rails, head propped with multiple pillows  -AM HOB downand no handrails, head propped with no pillows  -TW HOB down and no rails; head propped w/multiple pillows  -AM    Progress/Outcomes (Bed Mobility Goal 1, PT) goal not met  -AM progress slower than expected  -TW goal ongoing  -AM       Transfer Goal 1 (PT)    Activity/Assistive Device (Transfer Goal 1, PT) sit-to-stand/stand-to-sit;bed-to-chair/chair-to-bed;cane, straight;walker, rolling  -AM sit-to-stand/stand-to-sit;bed-to-chair/chair-to-bed;cane, straight;walker, rolling  -TW sit-to-stand/stand-to-sit;bed-to-chair/chair-to-bed;walker, rolling;cane, straight  -AM    [place] Level/Cues Needed (Transfer Goal 1, PT) minimum assist (75% or more patient effort)  -AM minimum assist (75% or more patient effort)  -TW minimum assist (75% or more patient effort)  -AM    Time Frame (Transfer Goal 1, PT) 2 - 3 days  -AM 2 - 3 days  -TW 2 - 3 days  -AM    Progress/Outcome (Transfer Goal 1, PT) goal not met  -AM progress slower than expected  -TW goal ongoing  -AM       Gait Training Goal 1 (PT)    Activity/Assistive Device (Gait Training Goal 1, PT) gait (walking locomotion);cane, straight;walker, rolling  -AM gait (walking locomotion);cane, straight;walker, rolling  -TW gait (walking locomotion);walker, rolling;cane, straight  -AM    [place] Level (Gait Training Goal 1, PT) contact guard assist  -AM contact guard assist  -TW contact guard assist  -AM    Distance (Gait Goal 1, PT) 100 ft  -'   -  -AM    Time Frame (Gait Training Goal 1, PT) by discharge  -AM by discharge  -TW by discharge  -AM    Progress/Outcome (Gait Training Goal 1, PT) goal not met  -AM progress slower than expected  -TW goal ongoing  -AM       Strength Goal 1 (PT)    Strength Goal 1 (PT) 20 reps all ex BLE AROM, For pt to be able to get BLE up onto bed Independently   -AM 20 reps all ex BLE AROM. For pt to be able to get BLE up onto bed ind  -TW 20 reps all ex BLE AROM. For pt to be able to get BLE up onto bed Ind  -AM    Time Frame (Strength Goal 1, PT) 2 days;3 days  -AM 2 days;3 days  -TW 2 days;3 days  -AM    Progress/Outcome (Strength Goal 1, PT) goal not met  -AM progress slower than expected  -TW goal ongoing  -AM    Row Name 03/10/18 1300             Bed Mobility Goal 1 (PT)    Activity/Assistive Device (Bed Mobility Goal 1, PT) sit to supine/supine to sit  -SM      [place] Level/Cues Needed (Bed Mobility Goal 1, PT) contact guard assist  -SM      Time Frame (Bed Mobility Goal 1, PT) 2 days;3 days  -SM      Barriers (Bed Mobility Goal 1, PT) HOB down and no rails, head propped with multiple pillows  -SM      Progress/Outcomes (Bed Mobility Goal 1, PT) goal ongoing  -SM         Transfer Goal 1 (PT)    Activity/Assistive Device (Transfer Goal 1, PT) bed-to-chair/chair-to-bed;sit-to-stand/stand-to-sit;walker, rolling;cane, straight  -SM      [place] Level/Cues Needed (Transfer Goal 1, PT) minimum assist (75% or more patient effort)  -SM      Time Frame (Transfer Goal 1, PT) 2 - 3 days  -SM      Progress/Outcome (Transfer Goal 1, PT) goal ongoing  -SM         Gait Training Goal 1 (PT)    Activity/Assistive Device (Gait Training Goal 1, PT) gait (walking locomotion);walker, rolling;cane, straight  -SM      [place] Level (Gait Training Goal 1, PT) contact guard assist  -SM      Distance (Gait Goal 1, PT) 100'  -SM      Time Frame (Gait Training Goal 1, PT) by discharge  -SM      Progress/Outcome (Gait Training  Goal 1, PT) goal ongoing  -SM         Strength Goal 1 (PT)    Strength Goal 1 (PT) 20 reps all ex BLE AROM, For pt to be able to get BLE up onto bed  Independently   -SM      Time Frame (Strength Goal 1, PT) 2 days;3 days  -SM      Progress/Outcome (Strength Goal 1, PT) goal ongoing  -SM        User Key  (r) = Recorded By, (t) = Taken By, (c) = Cosigned By    Initials Name Provider Type    AM Imer Diaz, [facility] Physical Therapy Assistant     Merna Mehta, [facility] Physical Therapy Assistant     Kael Arriaga, [facility] Physical Therapy Assistant          Physical Therapy Education     Title: PT OT SLP Therapies (Active)     Topic: Physical Therapy (Active)     Point: Mobility training (Active)    Learning Progress Summary     Learner Status Readiness Method Response Comment Documented by    Patient Active Acceptance E,TB,D NR pt and spouse educated on safety and use of gait belt. Spouse able to demonstrate proper use of belt to assist pt at home  03/10/18 1334     Active Acceptance D NR   03/09/18 1157    Family Active Acceptance D NR   03/09/18 1157    Significant Other Active Acceptance E,TB,D NR pt and spouse educated on safety and use of gait belt. Spouse able to demonstrate proper use of belt to assist pt at home  03/10/18 1334          Point: Precautions (Active)    Learning Progress Summary     Learner Status Readiness Method Response Comment Documented by    Patient Active Acceptance D NR   03/09/18 1157    Family Active Acceptance D NR   03/09/18 1157                      User Key     Initials Effective Dates Name Provider Type Discipline     04/06/17 -  Melanie Larose, PT Physical Therapist PT     03/07/18 -  Merna Mehta, [facility] Physical Therapy Assistant PT                    PT Recommendation and Plan  Anticipated Discharge Disposition (PT): skilled nursing facility (SNF)  Therapy Frequency (PT Clinical Impression): daily  Daily Summary of Progress (PT): progress toward functional  goals is gradual  Plan for Continued Treatment (PT): Gt/OOB  Plan of Care Reviewed With: patient, spouse  Outcome Summary: Pt did not meet any PT goals this tx. Pt able to t/f to EOB Max A. Pt stood X3 attempts but unable to take steps. Pt able to complete stand pivot Max A2 to recliner. Pt would benefit from SNF placement.           Outcome Measures     Row Name 03/13/18 1345 03/13/18 1040 03/12/18 1355       How much help from another person do you currently need...    Turning from your back to your side while in flat bed without using bedrails? 2  -AM 2  -AM 2  -TW    Moving from lying on back to sitting on the side of a flat bed without bedrails? 2  -AM 2  -AM 2  -TW    Moving to and from a bed to a chair (including a wheelchair)? 2  -AM 2  -AM 1  -TW    Standing up from a chair using your arms (e.g., wheelchair, bedside chair)? 2  -AM 2  -AM 2  -TW    Climbing 3-5 steps with a railing? 1  -AM 1  -AM 1  -TW    To walk in hospital room? 1  -AM 1  -AM 1  -TW    AM-PAC 6 Clicks Score 10  -AM 10  -AM 9  -TW       Functional Assessment    Outcome Measure Options AM-PAC 6 Clicks Basic Mobility (PT)  -AM AM-PAC 6 Clicks Basic Mobility (PT)  -AM AM-PAC 6 Clicks Basic Mobility (PT)  -TW    Row Name 03/12/18 0848 03/11/18 0845          How much help from another person do you currently need...    Turning from your back to your side while in flat bed without using bedrails?  -- 2  -AM     Moving from lying on back to sitting on the side of a flat bed without bedrails?  -- 2  -AM     Moving to and from a bed to a chair (including a wheelchair)?  -- 1  -AM     Standing up from a chair using your arms (e.g., wheelchair, bedside chair)?  -- 2  -AM     Climbing 3-5 steps with a railing?  -- 1  -AM     To walk in hospital room?  -- 1  -AM     AM-PAC 6 Clicks Score  -- 9  -AM        How much help from another is currently needed...    Putting on and taking off regular lower body clothing? 1  -RC  --     Bathing (including  washing, rinsing, and drying) 2  -RC  --     Toileting (which includes using toilet bed pan or urinal) 2  -RC  --     Putting on and taking off regular upper body clothing 2  -RC  --     Taking care of personal grooming (such as brushing teeth) 3  -RC  --     Eating meals 3  -RC  --     Score 13  -RC  --        Functional Assessment    Outcome Measure Options  -- AM-PAC 6 Clicks Basic Mobility (PT)  -AM       User Key  (r) = Recorded By, (t) = Taken By, (c) = Cosigned By    Initials Name Provider Type    AM Imer Diaz PTA Physical Therapy Assistant    TW Kael Arriaga PTA Physical Therapy Assistant    RC СЕРГЕЙ Martin Occupational Therapy Assistant           Time Calculation:         PT Charges     Row Name 03/13/18 1345 03/13/18 1040          Time Calculation    Start Time 1345  -AM 1040  -AM     Stop Time 1400  -AM 1120  -AM     Time Calculation (min) 15 min  -AM 40 min  -AM     PT Received On 03/13/18  -AM 03/13/18  -AM     PT - Next Appointment 03/14/18  -AM 03/14/18  -AM        Time Calculation- PT    Total Timed Code Minutes- PT 15 minute(s)  -AM 40 minute(s)  -AM       User Key  (r) = Recorded By, (t) = Taken By, (c) = Cosigned By    Initials Name Provider Type    AM Imer Diaz PTA Physical Therapy Assistant          Therapy Charges for Today     Code Description Service Date Service Provider Modifiers Qty    11748516941 HC PT THER PROC EA 15 MIN 3/13/2018 Imer Diaz PTA GP 2    01739224935 HC PT SELF CARE/MGMT/TRAIN EA 15 MIN 3/13/2018 Imer Diaz PTA GP 1    31277145893 HC PT THER PROC EA 15 MIN 3/13/2018 Imer Diaz PTA  1          PT G-Codes  PT Professional Judgement Used?: Yes  Outcome Measure Options: AM-PAC 6 Clicks Basic Mobility (PT)  Score: 8  Functional Limitation: Mobility: Walking and moving around  Mobility: Walking and Moving Around Current Status (): At least 80 percent but less than 100 percent impaired, limited or restricted  Mobility:  Walking and Moving Around Goal Status (): At least 60 percent but less than 80 percent impaired, limited or restricted    Imer Diaz, PTA  3/13/2018

## 2018-03-13 NOTE — THERAPY DISCHARGE NOTE
Acute Care - Physical Therapy Discharge Summary  [facility]       Patient Name: Dorcas Bain  : 1940  MRN: [id number]    Today's Date: 3/13/2018       Date of Referral to PT: 18         Admit Date: 3/6/2018      PT Recommendation and Plan    Visit Dx:    ICD-10-CM ICD-9-CM   1. Impaired physical mobility Z74.09 781.99   2. Impaired mobility and activities of daily living Z74.09 799.89             Outcome Measures     Row Name 18 1345 18 1040 18 1355       How much help from another person do you currently need...    Turning from your back to your side while in flat bed without using bedrails? 2  -AM 2  -AM 2  -TW    Moving from lying on back to sitting on the side of a flat bed without bedrails? 2  -AM 2  -AM 2  -TW    Moving to and from a bed to a chair (including a wheelchair)? 2  -AM 2  -AM 1  -TW    Standing up from a chair using your arms (e.g., wheelchair, bedside chair)? 2  -AM 2  -AM 2  -TW    Climbing 3-5 steps with a railing? 1  -AM 1  -AM 1  -TW    To walk in hospital room? 1  -AM 1  -AM 1  -TW    AM-PAC 6 Clicks Score 10  -AM 10  -AM 9  -TW       Functional Assessment    Outcome Measure Options AM-PAC 6 Clicks Basic Mobility (PT)  -AM AM-PAC 6 Clicks Basic Mobility (PT)  -AM AM-PAC 6 Clicks Basic Mobility (PT)  -TW    Row Name 18 0848 18 0845          How much help from another person do you currently need...    Turning from your back to your side while in flat bed without using bedrails?  -- 2  -AM     Moving from lying on back to sitting on the side of a flat bed without bedrails?  -- 2  -AM     Moving to and from a bed to a chair (including a wheelchair)?  -- 1  -AM     Standing up from a chair using your arms (e.g., wheelchair, bedside chair)?  -- 2  -AM     Climbing 3-5 steps with a railing?  -- 1  -AM     To walk in hospital room?  -- 1  -AM     AM-PAC 6 Clicks Score  -- 9  -AM        How much help from another is currently needed...    Putting on  and taking off regular lower body clothing? 1  -RC  --     Bathing (including washing, rinsing, and drying) 2  -RC  --     Toileting (which includes using toilet bed pan or urinal) 2  -RC  --     Putting on and taking off regular upper body clothing 2  -RC  --     Taking care of personal grooming (such as brushing teeth) 3  -RC  --     Eating meals 3  -RC  --     Score 13  -RC  --        Functional Assessment    Outcome Measure Options  -- AM-PAC 6 Clicks Basic Mobility (PT)  -AM       User Key  (r) = Recorded By, (t) = Taken By, (c) = Cosigned By    Initials Name Provider Type    AM Imer Diaz PTA Physical Therapy Assistant    ROEL Arriaga PTA Physical Therapy Assistant    СЕРГЕЙ Yip Occupational Therapy Assistant                PT Charges     Row Name 03/13/18 1345 03/13/18 1040          Time Calculation    Start Time 1345  -AM 1040  -AM     Stop Time 1400  -AM 1120  -AM     Time Calculation (min) 15 min  -AM 40 min  -AM     PT Received On 03/13/18  -AM 03/13/18  -AM     PT - Next Appointment 03/14/18  -AM 03/14/18  -AM        Time Calculation- PT    Total Timed Code Minutes- PT 15 minute(s)  -AM 40 minute(s)  -AM       User Key  (r) = Recorded By, (t) = Taken By, (c) = Cosigned By    Initials Name Provider Type    AM Imer Diaz PTA Physical Therapy Assistant                  PT Rehab Goals     Row Name 03/13/18 1040 03/12/18 1355 03/11/18 0845       Bed Mobility Goal 1 (PT)    Activity/Assistive Device (Bed Mobility Goal 1, PT) sit to supine/supine to sit  -AM sit to supine/supine to sit  -TW sit to supine;supine to sit  -AM    [place] Level/Cues Needed (Bed Mobility Goal 1, PT) contact guard assist  -AM contact guard assist  -TW contact guard assist  -AM    Time Frame (Bed Mobility Goal 1, PT) 2 days;3 days  -AM 2 days;3 days  -TW 2 days;3 days  -AM    Barriers (Bed Mobility Goal 1, PT) HOB down and no rails, head propped with multiple pillows  -AM HOB downand no  handrails, head propped with no pillows  -TW HOB down and no rails; head propped w/multiple pillows  -AM    Progress/Outcomes (Bed Mobility Goal 1, PT) goal not met;discharged from facility  -AM progress slower than expected  -TW goal ongoing  -AM       Transfer Goal 1 (PT)    Activity/Assistive Device (Transfer Goal 1, PT) sit-to-stand/stand-to-sit;bed-to-chair/chair-to-bed;cane, straight;walker, rolling  -AM sit-to-stand/stand-to-sit;bed-to-chair/chair-to-bed;cane, straight;walker, rolling  -TW sit-to-stand/stand-to-sit;bed-to-chair/chair-to-bed;walker, rolling;cane, straight  -AM    [place] Level/Cues Needed (Transfer Goal 1, PT) minimum assist (75% or more patient effort)  -AM minimum assist (75% or more patient effort)  -TW minimum assist (75% or more patient effort)  -AM    Time Frame (Transfer Goal 1, PT) 2 - 3 days  -AM 2 - 3 days  -TW 2 - 3 days  -AM    Progress/Outcome (Transfer Goal 1, PT) goal not met;discharged from facility  -AM progress slower than expected  -TW goal ongoing  -AM       Gait Training Goal 1 (PT)    Activity/Assistive Device (Gait Training Goal 1, PT) gait (walking locomotion);cane, straight;walker, rolling  -AM gait (walking locomotion);cane, straight;walker, rolling  -TW gait (walking locomotion);walker, rolling;cane, straight  -AM    [place] Level (Gait Training Goal 1, PT) contact guard assist  -AM contact guard assist  -TW contact guard assist  -AM    Distance (Gait Goal 1, PT) 100 ft  -'  -  -AM    Time Frame (Gait Training Goal 1, PT) by discharge  -AM by discharge  -TW by discharge  -AM    Progress/Outcome (Gait Training Goal 1, PT) goal not met;discharged from facility  -AM progress slower than expected  -TW goal ongoing  -AM       Strength Goal 1 (PT)    Strength Goal 1 (PT) 20 reps all ex BLE AROM, For pt to be able to get BLE up onto bed Independently   -AM 20 reps all ex BLE AROM. For pt to be able to get BLE up onto bed ind  -TW 20 reps all ex BLE  AROM. For pt to be able to get BLE up onto bed Ind  -AM    Time Frame (Strength Goal 1, PT) 2 days;3 days  -AM 2 days;3 days  -TW 2 days;3 days  -AM    Progress/Outcome (Strength Goal 1, PT) goal not met;discharged from facility  -AM progress slower than expected  -TW goal ongoing  -AM    Row Name 03/10/18 1300             Bed Mobility Goal 1 (PT)    Activity/Assistive Device (Bed Mobility Goal 1, PT) sit to supine/supine to sit  -SM      [place] Level/Cues Needed (Bed Mobility Goal 1, PT) contact guard assist  -SM      Time Frame (Bed Mobility Goal 1, PT) 2 days;3 days  -SM      Barriers (Bed Mobility Goal 1, PT) HOB down and no rails, head propped with multiple pillows  -SM      Progress/Outcomes (Bed Mobility Goal 1, PT) goal ongoing  -SM         Transfer Goal 1 (PT)    Activity/Assistive Device (Transfer Goal 1, PT) bed-to-chair/chair-to-bed;sit-to-stand/stand-to-sit;walker, rolling;cane, straight  -SM      [place] Level/Cues Needed (Transfer Goal 1, PT) minimum assist (75% or more patient effort)  -SM      Time Frame (Transfer Goal 1, PT) 2 - 3 days  -SM      Progress/Outcome (Transfer Goal 1, PT) goal ongoing  -SM         Gait Training Goal 1 (PT)    Activity/Assistive Device (Gait Training Goal 1, PT) gait (walking locomotion);walker, rolling;cane, straight  -SM      [place] Level (Gait Training Goal 1, PT) contact guard assist  -SM      Distance (Gait Goal 1, PT) 100'  -SM      Time Frame (Gait Training Goal 1, PT) by discharge  -SM      Progress/Outcome (Gait Training Goal 1, PT) goal ongoing  -SM         Strength Goal 1 (PT)    Strength Goal 1 (PT) 20 reps all ex BLE AROM, For pt to be able to get BLE up onto bed  Independently   -SM      Time Frame (Strength Goal 1, PT) 2 days;3 days  -SM      Progress/Outcome (Strength Goal 1, PT) goal ongoing  -SM        User Key  (r) = Recorded By, (t) = Taken By, (c) = Cosigned By    Initials Name Provider Type    AM Imer Diaz, PTA Physical  Therapy Assistant    SRAVANI Mehta, PTA Physical Therapy Assistant    ROEL Arriaga PTA Physical Therapy Assistant          Therapy Charges for Today     Code Description Service Date Service Provider Modifiers Qty    86963128400 HC PT THER PROC EA 15 MIN 3/13/2018 Imer Diza PTA GP 2    13035105441 HC PT SELF CARE/MGMT/TRAIN EA 15 MIN 3/13/2018 Imer Diaz PTA GP 1    15324941470 HC PT THER PROC EA 15 MIN 3/13/2018 Imer Diaz PTA  1          PT Discharge Summary  Anticipated Discharge Disposition (PT): skilled nursing facility (SNF)  Reason for Discharge: Discharge from facility, Per MD order  Outcomes Achieved: Unable to make functional progress toward goals at this time  Discharge Destination: SNF      Imer Diaz PTA   3/13/2018

## 2018-03-13 NOTE — DISCHARGE SUMMARY
[facility] Medicine Services  DISCHARGE SUMMARY       Date of Admission: 3/6/2018  Date of Discharge:  3/13/2018  Primary Care Physician: Markell Muller MD    Presenting Problem/History of Present Illness:  MELODY (acute kidney injury) [N17.9]     Final Discharge Diagnoses:  CKD  Deconditioning  Weakness and mobility impairment  Klebsiella UTI, resolved  Proteus UTI, resolved    Consults:   Consults     Date and Time Order Name Status Description    3/6/2018 2314 Inpatient Consult to Nephrology Completed         Pertinent Test Results:     Lab Results (last 24 hours)     Procedure Component Value Units Date/Time    POC Glucose Once [[id number]]  (Abnormal) Collected:  03/13/18 1106    Specimen:  Blood Updated:  03/13/18 1243     Glucose 216 (H) mg/dL      Comment: RN NotifiedMeter: [id number]: [id number] TIRMIKE STANISLAV       POC Glucose Once [[id number]]  (Abnormal) Collected:  03/13/18 0739    Specimen:  Blood Updated:  03/13/18 0809     Glucose 212 (H) mg/dL      Comment: RN NotifiedMeter: [id number]: [id number] SVETLANA STANISLAV       Protime-INR [[id number]]  (Abnormal) Collected:  03/13/18 0526    Specimen:  Blood Updated:  03/13/18 0633     Protime 21.6 (H) Seconds      INR 1.96 (H)    Narrative:       Therapeutic range for most indications is 2.0-3.0 INR,  or 2.5-3.5 for mechanical heart valves.    Basic Metabolic Panel [[id number]]  (Abnormal) Collected:  03/13/18 0526    Specimen:  Blood Updated:  03/13/18 0607     Glucose 191 (H) mg/dL      BUN 59 (H) mg/dL      Creatinine 1.92 (H) mg/dL      Sodium 136 (L) mmol/L      Potassium 3.9 mmol/L      Chloride 94 (L) mmol/L      CO2 32.0 (H) mmol/L      Calcium 10.9 (H) mg/dL      eGFR Non African Amer 25 (L) mL/min/1.73      BUN/Creatinine Ratio 30.7 (H)     Anion Gap 10.0 mmol/L     Narrative:       The MDRD GFR formula is only valid for adults with stable renal function between ages 18 and 70.     CBC & Differential [[id number]] Collected:  03/13/18 0526    Specimen:  Blood Updated:  03/13/18 0553    Narrative:       The following orders were created for panel order CBC & Differential.  Procedure                               Abnormality         Status                     ---------                               -----------         ------                     CBC Auto Differential[[id number]]        Abnormal            Final result                 Please view results for these tests on the individual orders.    CBC Auto Differential [[id number]]  (Abnormal) Collected:  03/13/18 0526    Specimen:  Blood Updated:  03/13/18 0553     WBC 9.83 (H) 10*3/mm3      RBC 3.57 (L) 10*6/mm3      Hemoglobin 9.5 (L) g/dL      Hematocrit 29.6 (L) %      MCV 82.9 fL      MCH 26.6 pg      MCHC 32.1 g/dL      RDW 17.8 (H) %      RDW-SD 54.0 (H) fl      MPV 11.0 fL      Platelets 307 10*3/mm3      Neutrophil % 71.7 %      Lymphocyte % 16.5 %      Monocyte % 7.5 %      Eosinophil % 3.8 %      Basophil % 0.2 %      Immature Grans % 0.3 %      Neutrophils, Absolute 7.05 10*3/mm3      Lymphocytes, Absolute 1.62 10*3/mm3      Monocytes, Absolute 0.74 10*3/mm3      Eosinophils, Absolute 0.37 10*3/mm3      Basophils, Absolute 0.02 10*3/mm3      Immature Grans, Absolute 0.03 (H) 10*3/mm3     Blood Culture - Blood, [[id number]]  (Normal) Collected:  03/09/18 0244    Specimen:  Blood from Hand, Left Updated:  03/13/18 0346     Blood Culture No growth at 4 days    Blood Culture - Blood, [[id number]]  (Normal) Collected:  03/09/18 0245    Specimen:  Blood from Wrist, Left Updated:  03/13/18 0346     Blood Culture No growth at 4 days    POC Glucose Once [[id number]]  (Abnormal) Collected:  03/12/18 2033    Specimen:  Blood Updated:  03/12/18 2150     Glucose 244 (H) mg/dL      Comment: Meter: [id number]: [id number] NINO MCCANN       POC Glucose Once [[id number]]  (Abnormal) Collected:  03/12/18 1628    Specimen:  Blood Updated:  03/12/18  1716     Glucose 256 (H) mg/dL      Comment: RN NotifiedMeter: [id number]: [id number] SVETLANA COLORADO             Imaging Results (last 72 hours)     Procedure Component Value Units Date/Time    XR Knee 4+ View Bilateral [[id number]] Collected:  03/12/18 1641     Updated:  03/12/18 1726    Narrative:         PROCEDURE:  Bilateral  Knee  4  views    REASON FOR EXAM: Knee pain, poor gait, inability to ambulate,  Z74.09 Other reduced mobility Z74.09 Other reduced mobility    FINDINGS: . Moderate loss of right knee medial knee joint  compartment height suspicious for osteoarthritic changes.  Otherwise bony structures of the right knee are unremarkable.  There is no evidence of fracture or dislocation. Small  suprapatellar joint effusion. Atherosclerotic vascular  calcifications are seen.    Left knee bony structures are normal. There is no evidence of  fracture or dislocation. Soft tissue structures normal.  Atherosclerotic vascular calcifications.. Distal left femoral  artery vascular stent in place.      Impression:       1. Moderate right knee osteoarthritic changes..  2. Right knees small suprapatellar joint effusion..  3. Bilateral knee atherosclerotic vascular calcifications.  4. Left femoral artery vascular stent in place.  5. Otherwise negative bilateral knees.    Electronically signed by:  Rocael Walter MD  3/12/2018 5:25 PM CDT  Workstation: [id number]    US Guided Vascular Access [[id number]] Resulted:  03/12/18 1341     Updated:  03/12/18 1341    Narrative:       This procedure was auto-finalized with no dictation required.    IR Insert Midline Without Port Pump 5 Plus [[id number]] Resulted:  03/12/18 1309     Updated:  03/12/18 1309    Narrative:       This procedure was auto-finalized with no dictation required.        Hospital Course:  The patient is a 77 y.o. female who [facility] to [facility] with multiple medical comorbidities.  Patient has a history of stage IV chronic kidney disease,  "congestive heart failure, hypertension, diabetes type 2, anemia of chronic kidney disease.  Patient was recently started on Entresto by her cardiologist.  Patient began to expand increased fatigue and weakness, itching, and the medication was discontinued.  At that time patient complained of increased edema of the lower extremities and patient was treated with double doses of Lasix.  Patient was taking Lasix 80 mg by mouth twice a day, and meaning that this dose was actually doubled.  At the time of admission her creatinine had risen to 4.34.  Patient also demonstrated gram-negative bacilli in the urine, Klebsiella and Proteus.  Patient was evaluated and treated by Dr. Osorio of nephrology, her creatinine returned to near baseline.  Patient repeat urine culture was negative.  Patient was treated with the appropriate antibiotics per culture and sensitivity.  Her leukocytosis had essentially resolved, her chest x-ray within normal limits, negative blood cultures.  It was determined that the patient was too weak to return home and her  could not care for her.  Patient was agreeable to go to [facility] and they have accepted the patient.  Patient has been cleared for discharge by nephrology.  Patient will follow-up with [facility] as well as nephrology.  Patient also had some knee pain, knee x-rays demonstrated moderate osteoarthritis.  Patient will be referred to orthopedics for an outpatient follow-up.  Patient was instructed to return with any nausea, vomiting, chest pain, shortness of air, uncontrolled pain.  Instructed to return with confusion, any worsening or concerning symptoms.    Condition on Discharge:  Stable, improved    Physical Exam on Discharge:  /59 (BP Location: Left arm, Patient Position: Lying)   Pulse 58   Temp 97.9 °F (36.6 °C) (Axillary)   Resp 18   Ht 157.5 cm (62\")   Wt 57.9 kg (127 lb 10.3 oz)   SpO2 97%   BMI 23.35 kg/m² "   Physical Exam    For full physical exam please refer to the progress note from same day, 3/13/2018.  No changes from earlier exam.    Discharge Disposition:  Skilled Nursing Facility (DC - External)    Discharge Medications:   Dorcas Bain   Home Medication Instructions APRRIS:[id number]    Printed on:03/13/18 1343   Medication Information                      aspirin 81 MG EC tablet  Take 81 mg by mouth Daily. Medical record from transferring hospital             calcitriol (ROCALTROL) 0.25 MCG capsule  Take 0.25 mcg by mouth Daily. Medical record from transferring hospital             carvedilol (COREG) 12.5 MG tablet  Take 12.5 mg by mouth 2 (Two) Times a Day With Meals.             CloNIDine (CATAPRES) 0.2 MG tablet  Take 0.2 mg by mouth Daily. Daily with breakfast             CloNIDine (CATAPRES) 0.2 MG tablet  Take 0.4 mg by mouth Every Night.             clorazepate (TRANXENE) 3.75 MG tablet  Take 3.75 mg by mouth Every Night.             colchicine 0.6 MG tablet  Take 0.6 mg by mouth As Needed for Muscle / Joint Pain.             esomeprazole (nexIUM) 40 MG capsule  Take 40 mg by mouth Every Morning Before Breakfast.             furosemide (LASIX) 80 MG tablet  Take 80 mg by mouth 2 (Two) Times a Day.             glyBURIDE (DIAbeta) 5 MG tablet  Take 10 mg by mouth 2 (Two) Times a Day.             HYDROcodone-acetaminophen (NORCO) 5-325 MG per tablet  Take 1.5 tablets by mouth Every 6 (Six) Hours As Needed.             insulin glargine (LANTUS) 100 UNIT/ML injection  Inject 20 Units under the skin Every Night.             insulin lispro (humaLOG) 100 UNIT/ML injection  Inject 5 Units under the skin.             levothyroxine (SYNTHROID, LEVOTHROID) 75 MCG tablet  Take 75 mcg by mouth Daily.             pravastatin (PRAVACHOL) 20 MG tablet  Take 80 mg by mouth Every Night.             vitamin D (ERGOCALCIFEROL) 63763 units capsule capsule  Take 50,000 Units by mouth.             warfarin (COUMADIN) 1 MG  tablet  Take 1 mg by mouth. Take every Tuesday, Wednesday, Friday, Saturday, Sunday             warfarin (COUMADIN) 2.5 MG tablet  Take 2.5 mg by mouth 2 (Two) Times a Week. On Mondays and Thursdays                 Discharge Diet:   Diet Instructions     Diet: Consistent Carbohydrate, Cardiac, Renal; Thin       Discharge Diet:   Consistent Carbohydrate  Cardiac  Renal       Fluid Consistency:  Thin          Activity at Discharge:   Activity Instructions     Activity as Tolerated       PT/OT to evaluate and treat          Discharge Care Plan/Instructions: As noted above.    Follow-up Appointments:   No future appointments.    Test Results Pending at Discharge:    Order Current Status    Blood Culture - Blood, Preliminary result    Blood Culture - Blood, Preliminary result                This document has been electronically signed by SAMRA Mulligan on March 13, 2018 3:14 PM          Time: Please note that this discharge planning and summary exceeded 30 minutes.

## 2018-03-13 NOTE — PLAN OF CARE
Problem: Patient Care Overview  Goal: Plan of Care Review   03/13/18 7884   Coping/Psychosocial   Plan of Care Reviewed With caregiver;patient;spouse   Plan of Care Review   Progress declining   OTHER   Outcome Summary Per pt and family decreased appetite. No po intake documented to assess. Declines supplements. Plans are for SNF placement for therapy.

## 2018-03-13 NOTE — PROGRESS NOTES
[facility] Medicine Services  INPATIENT PROGRESS NOTE    Length of Stay: 7  Date of Admission: 3/6/2018  Primary Care Physician: Markell Muller MD    Subjective   Please note that all previous progress notes, radiographical findings, lab findings, medication changes, and physical exam findings have been noted and updated as appropriate.    3/13/2018:  Continues medical stability.  Creatinine stable.  X-rays of knees demonstrate moderate osteoarthritis, will refer to therapy and orthopedics as appropriate.  Patient continues to be agreeable to rehabilitation and [facility].    3/12/2018: Patient continues to demonstrate medical stability.  Creatinine continues to be in her baseline.  Patient states she feels much better.  She did work with therapy today.  Patient is agreeable to swing bed or rehabilitation at [facility].  Have discussed with patient and  and case management is assisting.  No shortness of air or chest pain.    3/11/2018: Patient is medically stable.  Creatinine near baseline.  Electrolytes within normal limits.  White blood cell count trending down.  The main issue is that patient is extremely weak, physically deconditioned, and unable to care for herself.  Patient  also notes that he is unable to care for her.  At this time patient is hostile to the idea of continued care and demonstrates even possibly paranoid tendencies.  Patient believes that her  is attempting to still all of her money, home, and auto.  Prolonged discussion had with patient along with physical therapy explaining that the patient is unsafe at this time and unable to perform any actions without maximum assist.  Will discuss with case management.    3/10/2018: Patient continues to feel better, continues to have no acute complaints.  Patient growing both Klebsiella and Proteus, both sensitive to ceftriaxone which the patient is on.  Creatinine  continues to improve.  White count did trend upward, will continue to monitor.  No shortness of air, fever, chills, nausea, vomiting.  No chest pain.    3/9/2018: Patient has no acute complaints.  Did have a fever overnight, urinalysis demonstrating gram-negative bacilli and Klebsiella, sensitivity so far shows ceftriaxone as an appropriate drug.  Patient is on ceftriaxone.  Denies shortness of air, nausea, vomiting.  Repeat blood cultures pending.  Creatinine near baseline per nephrology.  Consult appreciated.    Chief Complaint/HPI:  This 77 year old  female was emitted to Hospital services secondary to multiple medical comorbidities.  Patient has a history of stage IV chronic kidney disease, congestive heart failure, hypertension, diabetes mellitus type 2, anemia of chronic kidney disease.  Patient was recently started on Entresto by her cardiologist.  Patient began to experience increased fatigue and weakness, itching, medication was discontinued.  At that time patient complained of increased edema of the lower extremities and patient was treated with double doses of Lasix.  Patient was taking Lasix 80 mg by mouth twice a day, meaning that this was the dose was doubled.  At the time of admission creatinine had risen to 4.34.    Patient also demonstrates gram negative bacilli in the urine.  This is likely Escherichia coli.  Currently on ceftriaxone.  At this time will order echocardiogram to further evaluate valvular function.  We will order PT and OT secondary to deconditioning.  Creatinine improving, patient denies any complaints at this time.    Review of Systems   Constitutional: Negative for chills and fever.   Respiratory: Negative for shortness of breath.    Cardiovascular: Negative for chest pain.   Gastrointestinal: Negative for abdominal pain, nausea and vomiting.   Neurological: Negative for dizziness and light-headedness.      All pertinent negatives and positives are as above. All other  systems have been reviewed and are negative unless otherwise stated.     Objective    Temp:  [96.1 °F (35.6 °C)-97.9 °F (36.6 °C)] 97.9 °F (36.6 °C)  Heart Rate:  [54-66] 58  Resp:  [18] 18  BP: (121-167)/(53-74) 121/59    Physical Exam   Constitutional: She appears well-developed and well-nourished.   HENT:   Head: Normocephalic and atraumatic.   Cardiovascular: Normal rate and regular rhythm.    Pulmonary/Chest: Effort normal. No respiratory distress.   Abdominal: Soft. Bowel sounds are normal. She exhibits no distension. There is no tenderness.   Neurological: She is alert.   Skin: Skin is warm and dry.     Results Review:  I have reviewed the labs, radiology results, and diagnostic studies.    Laboratory Data:     Results from last 7 days  Lab Units 03/13/18  0526 03/12/18  0740 03/11/18  0612 03/06/18  2332   SODIUM mmol/L 136* 139 139  < > 141   POTASSIUM mmol/L 3.9 4.5 4.3  < > 3.3*   CHLORIDE mmol/L 94* 102 99  < > 95   CO2 mmol/L 32.0* 26.0 29.0  < > 24.0   BUN mg/dL 59* 59* 60*  < > 96*   CREATININE mg/dL 1.92* 1.86* 2.14*  < > 4.34*   GLUCOSE mg/dL 191* 157* 156*  < > 212*   CALCIUM mg/dL 10.9* 10.3* 9.9  < > 9.6   BILIRUBIN mg/dL  --   --   --   --  0.6   ALK PHOS U/L  --   --   --   --  83   ALT (SGPT) U/L  --   --   --   --  29   AST (SGOT) U/L  --   --   --   --  16   ANION GAP mmol/L 10.0 11.0 11.0  < > 22.0*   < > = values in this interval not displayed.  Estimated Creatinine Clearance: 22.8 mL/min (by C-G formula based on SCr of 1.92 mg/dL (H)).            Results from last 7 days  Lab Units 03/13/18  0526 03/12/18  0739 03/11/18  0612 03/10/18  0604 03/08/18  0734   WBC 10*3/mm3 9.83* 11.12* 11.86* 14.54* 12.99*   HEMOGLOBIN g/dL 9.5* 9.5* 9.3* 9.5* 11.0*   HEMATOCRIT % 29.6* 29.8* 29.1* 29.6* 33.0*   PLATELETS 10*3/mm3 307 291 304 282 320       Results from last 7 days  Lab Units 03/13/18  0526 03/12/18  0739 03/11/18  0612 03/10/18  0604 03/09/18  0809   INR  1.96* 1.89* 2.22* 1.98* 2.04*        Culture Data:   No results found for: BLOODCX  No results found for: URINECX  No results found for: RESPCX  No results found for: WOUNDCX  No results found for: STOOLCX  No components found for: BODYFLD    Radiology Data:   Imaging Results (last 24 hours)     Procedure Component Value Units Date/Time    XR Knee 4+ View Bilateral [[id number]] Collected:  03/12/18 1641     Updated:  03/12/18 1726    Narrative:         PROCEDURE:  Bilateral  Knee  4  views    REASON FOR EXAM: Knee pain, poor gait, inability to ambulate,  Z74.09 Other reduced mobility Z74.09 Other reduced mobility    FINDINGS: . Moderate loss of right knee medial knee joint  compartment height suspicious for osteoarthritic changes.  Otherwise bony structures of the right knee are unremarkable.  There is no evidence of fracture or dislocation. Small  suprapatellar joint effusion. Atherosclerotic vascular  calcifications are seen.    Left knee bony structures are normal. There is no evidence of  fracture or dislocation. Soft tissue structures normal.  Atherosclerotic vascular calcifications.. Distal left femoral  artery vascular stent in place.      Impression:       1. Moderate right knee osteoarthritic changes..  2. Right knees small suprapatellar joint effusion..  3. Bilateral knee atherosclerotic vascular calcifications.  4. Left femoral artery vascular stent in place.  5. Otherwise negative bilateral knees.    Electronically signed by:  Rocael Walter MD  3/12/2018 5:25 PM CDT  Workstation: [id number]          I have reviewed the patient current medications.     Assessment/Plan     Hospital Problem List     MELODY (acute kidney injury)          Plan:  Work with case management for discharge planning as appropriate.  Patient most appropriate for SNF.                This document has been electronically signed by SAMRA Mulligan on March 13, 2018 2:16 PM

## 2018-03-13 NOTE — PLAN OF CARE
Problem: Wound (Includes Pressure Injury) (Adult)  Goal: Signs and Symptoms of Listed Potential Problems Will be Absent, Minimized or Managed (Wound)  Outcome: Ongoing (interventions implemented as appropriate)   03/13/18 1240   Goal/Outcome Evaluation   Problems Assessed (Wound) delayed wound healing   Problems Present (Wound) delayed wound healing  (decreased appetite.)

## 2018-03-13 NOTE — PROGRESS NOTES
"Anticoagulation by Pharmacy - Warfarin    Dorcas Bain is a 77 y.o.female  [Ht: 157.5 cm (62\"); Wt: 57.9 kg (127 lb 10.3 oz)] on Warfarin alternating doses of 1 mg daily except 2.5 mg on Mon, Thu, and Sat PO  for indication of a fib.    Goal INR: 2-3  Today's INR:   Lab Results   Component Value Date    INR 1.96 (H) 03/13/2018         Lab Results   Component Value Date    INR 1.96 (H) 03/13/2018    INR 1.89 (H) 03/12/2018    INR 2.22 (H) 03/11/2018    PROTIME 21.6 (H) 03/13/2018    PROTIME 21.0 (H) 03/12/2018    PROTIME 23.7 (H) 03/11/2018     Lab Results   Component Value Date    HGB 9.5 (L) 03/13/2018    HGB 9.5 (L) 03/12/2018    HGB 9.3 (L) 03/11/2018     Lab Results   Component Value Date    HCT 29.6 (L) 03/13/2018    HCT 29.8 (L) 03/12/2018    HCT 29.1 (L) 03/11/2018     Assessment/Plan:  INR 1.96 - slightly subtherapeutic, trending up  H&H stable  No significant interactions noted  Increased dose to 2.5 mg three times a week  Changed INR to every other day  1 mg aki Guevara, [facility]  03/13/18 2:35 PM     "

## 2018-03-13 NOTE — CONSULTS
"Adult Nutrition  Assessment    Patient Name:  Dorcas Bain  YOB: 1940  MRN: [id number]  Admit Date:  3/6/2018    Assessment Date:  3/13/2018    Comments:  Pt very agitated and not in a very good mood.  She is to speak with a representative from a SNF in [place]. She reports a decreased appetite.  No documentation on Po to assess at this time.  She did not like her lunch so RD obtained her something that she requested.  Menu suggestions and alternatives provided. Labs reviewed and noted--Bun and Creat remains elevated.  She is on Lasix, Abx, and Novolog.  Rd will monitor.            Adult Nutrition Assessment     Row Name 03/13/18 1236       PO Evaluation    Number of Days PO Intake Evaluated Insufficient Data    % PO Intake documentation not available.     Row Name 03/13/18 1235       Labs/Procedures/Meds    Lab Results Reviewed reviewed, pertinent    Lab Results Comments Bun 59; Creat 1.92       Physical Findings    Skin pressure injury       Nutrition Prescription PO    Current PO Diet Regular    Fluid Consistency Thin    Common Modifiers Consistent Carbohydrate    Row Name 03/13/18 1234       Nutrition/Diet History    Typical Food/Fluid Intake Pt not in good spirits.  She said her appetite was awful--she couldn't eat her lunch.  When I asked her why she said \"It will upset my stomach\"  Agreed to let me go get her something else.  SHe said she has had the worst care ever here.  (Informed staff-they were aware).      Row Name 03/13/18 1233       Reason for Assessment    Reason For Assessment follow-up protocol          Electronically signed by:  Shea Hargrove RD  03/13/18 12:41 PM  "

## 2018-03-14 LAB
BACTERIA SPEC AEROBE CULT: NORMAL
BACTERIA SPEC AEROBE CULT: NORMAL

## 2018-03-14 NOTE — THERAPY DISCHARGE NOTE
Acute Care - Occupational Therapy Discharge Summary  [facility]     Patient Name: Dorcas Bain  : 1940  MRN: [id number]    Today's Date: 3/14/2018  Onset of Illness/Injury or Date of Surgery Date: 18    Date of Referral to OT: 18  Referring Physician: Ronda CABRALES      Admit Date: 3/6/2018        OT Recommendation and Plan    Visit Dx:    ICD-10-CM ICD-9-CM   1. Impaired physical mobility Z74.09 781.99   2. Impaired mobility and activities of daily living Z74.09 799.89                     OT Rehab Goals     Row Name 18 0700 18 1040 18 1000       Bed Mobility Goal 1 (OT)    Activity/Assistive Device (Bed Mobility Goal 1, OT) bed mobility activities, all  - bed mobility activities, all  -LM bed mobility activities, all  -RC    [place] Level/Cues Needed (Bed Mobility Goal 1, OT) supervision required  - supervision required  - supervision required  -    Time Frame (Bed Mobility Goal 1, OT) by discharge  - by discharge  - by discharge  -    Progress/Outcomes (Bed Mobility Goal 1, OT) goal not met  - continuing progress toward goal;goal ongoing  - continuing progress toward goal;goal not met  -       Transfer Goal 1 (OT)    Activity/Assistive Device (Transfer Goal 1, OT) transfers, all;walker, rolling;commode, bedside with drop arms  -BH transfers, all  -LM transfers, all  -RC    [place] Level/Cues Needed (Transfer Goal 1, OT) contact guard assist  - contact guard assist  - contact guard assist  -RC    Time Frame (Transfer Goal 1, OT) by discharge  - by discharge  - by discharge  -    Progress/Outcome (Transfer Goal 1, OT) goal not met  - goal not met;continuing progress toward goal  - goal not met;continuing progress toward goal  -RC       Problem Specific Goal 1 (OT)    Problem Specific Goal 1 (OT) Pt. will complete dynamic sitting balance with UE support with cond I for 10 min sitting EOB to complete ADLS.  - Pt. will complete  dynamic sitting balance with UE support with cond I for 10 min sitting EOB to complete ADLS.  -LM Pt. will complete dynamic sitting balance with UE support with cond I for 10 min sitting EOB to complete ADLS.  -    Time Frame (Problem Specific Goal 1, OT) by discharge  - by discharge  -LM by discharge  -    Progress/Outcome (Problem Specific Goal 1, OT) goal not met  -BH continuing progress toward goal  -LM continuing progress toward goal;goal not met  -       Problem Specific Goal 2 (OT)    Problem Specific Goal 2 (OT) Pt. will complete all UB ADL with Min A, LB ADL Mod A, and grooming with set-up/cond I  -BH Pt. will complete all UB ADL with Min A, LB ADL Mod A, and grooming with set-up/cond I  -LM Pt. will complete dynamic sitting balance with UE support with cond I for 10 min sitting EOB to complete ADLS.  -    Time Frame (Problem Specific Goal 2, OT) by discharge  - by discharge  -LM by discharge  -    Progress/Outcome (Problem Specific Goal 2, OT) goal not met  - goal ongoing;continuing progress toward goal  -LM continuing progress toward goal;goal not met  -    Row Name 03/10/18 0855 03/10/18 0600          Bed Mobility Goal 1 (OT)    Activity/Assistive Device (Bed Mobility Goal 1, OT) bed mobility activities, all  -BH bed mobility activities, all  -NN     [place] Level/Cues Needed (Bed Mobility Goal 1, OT) supervision required  - supervision required  -NN     Time Frame (Bed Mobility Goal 1, OT) by discharge  - by discharge  -NN     Barriers (Bed Mobility Goal 1, OT) pt required encouragment and has increased pain with movement.   -  --     Progress/Outcomes (Bed Mobility Goal 1, OT) goal ongoing;continuing progress toward goal  - goal ongoing  -NN        Transfer Goal 1 (OT)    Activity/Assistive Device (Transfer Goal 1, OT) transfers, all;walker, rolling;commode, bedside with drop arms  - transfers, all;walker, rolling;commode, bedside with drop arms  -NN     [place]  Level/Cues Needed (Transfer Goal 1, OT) contact guard assist  - contact guard assist  -NN     Time Frame (Transfer Goal 1, OT) by discharge  - by discharge  -NN     Barriers (Transfers Goal 1, OT) pain limits pt, inconsistent with sit to stand.   -  --     Progress/Outcome (Transfer Goal 1, OT) goal ongoing  - goal ongoing  -NN        Problem Specific Goal 1 (OT)    Problem Specific Goal 1 (OT) Pt. will complete dynamic sitting balance with UE support with cond I for 10 min sitting EOB to complete ADLS.  - Pt. will complete dynamic sitting balance with UE support with cond I for 10 min sitting EOB to complete ADLS.  -NN     Time Frame (Problem Specific Goal 1, OT) by discharge  - by discharge  -NN     Progress/Outcome (Problem Specific Goal 1, OT) goal ongoing;continuing progress toward goal  - goal ongoing  -NN        Problem Specific Goal 2 (OT)    Problem Specific Goal 2 (OT) Pt. will complete all UB ADL with Min A, LB ADL Mod A, and grooming with set-up/cond I  -BH Pt. will complete all UB ADL with Min A, LB ADL Mod A, and grooming with set-up/cond I  -NN     Time Frame (Problem Specific Goal 2, OT) by discharge  - by discharge  -NN     Progress/Outcome (Problem Specific Goal 2, OT) goal ongoing;continuing progress toward goal  - goal ongoing  -NN       User Key  (r) = Recorded By, (t) = Taken By, (c) = Cosigned By    Initials Name Provider Type     Germaine Avila, OTR/L Occupational Therapist    JEFERSON Steve, CALLAWAY/L Occupational Therapy Assistant    SHABANA Bush CALLAWAY/L Occupational Therapy Assistant    NN Carissa Cobos, OTR/L Occupational Therapist                Outcome Measures     Row Name 03/13/18 1500 03/13/18 1345 03/13/18 1040       How much help from another person do you currently need...    Turning from your back to your side while in flat bed without using bedrails?  -- 2  -AM 2  -AM    Moving from lying on back to sitting on the side of a flat bed without  bedrails?  -- 2  -AM 2  -AM    Moving to and from a bed to a chair (including a wheelchair)?  -- 2  -AM 2  -AM    Standing up from a chair using your arms (e.g., wheelchair, bedside chair)?  -- 2  -AM 2  -AM    Climbing 3-5 steps with a railing?  -- 1  -AM 1  -AM    To walk in hospital room?  -- 1  -AM 1  -AM    AM-PAC 6 Clicks Score  -- 10  -AM 10  -AM       How much help from another is currently needed...    Putting on and taking off regular lower body clothing? 1  -LM  --  --    Bathing (including washing, rinsing, and drying) 2  -LM  --  --    Toileting (which includes using toilet bed pan or urinal) 2  -LM  --  --    Putting on and taking off regular upper body clothing 2  -LM  --  --    Taking care of personal grooming (such as brushing teeth) 3  -LM  --  --    Eating meals 3  -LM  --  --    Score 13  -LM  --  --       Functional Assessment    Outcome Measure Options  -- AM-PAC 6 Clicks Basic Mobility (PT)  -AM AM-PAC 6 Clicks Basic Mobility (PT)  -AM    Row Name 03/12/18 1355 03/12/18 0848 03/11/18 0845       How much help from another person do you currently need...    Turning from your back to your side while in flat bed without using bedrails? 2  -TW  -- 2  -AM    Moving from lying on back to sitting on the side of a flat bed without bedrails? 2  -TW  -- 2  -AM    Moving to and from a bed to a chair (including a wheelchair)? 1  -TW  -- 1  -AM    Standing up from a chair using your arms (e.g., wheelchair, bedside chair)? 2  -TW  -- 2  -AM    Climbing 3-5 steps with a railing? 1  -TW  -- 1  -AM    To walk in hospital room? 1  -TW  -- 1  -AM    AM-PAC 6 Clicks Score 9  -TW  -- 9  -AM       How much help from another is currently needed...    Putting on and taking off regular lower body clothing?  -- 1  -RC  --    Bathing (including washing, rinsing, and drying)  -- 2  -RC  --    Toileting (which includes using toilet bed pan or urinal)  -- 2  -RC  --    Putting on and taking off regular upper body clothing   -- 2  -RC  --    Taking care of personal grooming (such as brushing teeth)  -- 3  -RC  --    Eating meals  -- 3  -RC  --    Score  -- 13  -RC  --       Functional Assessment    Outcome Measure Options AM-PAC 6 Clicks Basic Mobility (PT)  -TW  -- AM-PAC 6 Clicks Basic Mobility (PT)  -AM      User Key  (r) = Recorded By, (t) = Taken By, (c) = Cosigned By    Initials Name Provider Type    AM Imer Diaz PTA Physical Therapy Assistant    TW Kael Arriaga PTA Physical Therapy Assistant    RC Belkys Steve CALLAWAY/L Occupational Therapy Assistant    SHABANA Bush, CALLAWAY/L Occupational Therapy Assistant              OT Discharge Summary  Reason for Discharge: Discharge from facility  Outcomes Achieved: Refer to plan of care for updates on goals achieved  Discharge Destination: [facility]      Germaine Avila OTR/L  3/14/2018

## 2018-03-23 DIAGNOSIS — M25.561 PAIN IN BOTH KNEES, UNSPECIFIED CHRONICITY: Primary | ICD-10-CM

## 2018-03-23 DIAGNOSIS — M25.562 PAIN IN BOTH KNEES, UNSPECIFIED CHRONICITY: Primary | ICD-10-CM

## 2018-05-23 ENCOUNTER — OFFICE VISIT (OUTPATIENT)
Dept: CARDIOLOGY | Age: 78
End: 2018-05-23
Payer: MEDICARE

## 2018-05-23 VITALS
HEIGHT: 62 IN | SYSTOLIC BLOOD PRESSURE: 130 MMHG | HEART RATE: 57 BPM | BODY MASS INDEX: 27.79 KG/M2 | DIASTOLIC BLOOD PRESSURE: 68 MMHG | WEIGHT: 151 LBS

## 2018-05-23 DIAGNOSIS — Z72.0 TOBACCO ABUSE: ICD-10-CM

## 2018-05-23 DIAGNOSIS — N18.30 CKD (CHRONIC KIDNEY DISEASE) STAGE 3, GFR 30-59 ML/MIN (HCC): ICD-10-CM

## 2018-05-23 DIAGNOSIS — I10 ESSENTIAL HYPERTENSION: ICD-10-CM

## 2018-05-23 DIAGNOSIS — E78.2 MIXED HYPERLIPIDEMIA: Primary | ICD-10-CM

## 2018-05-23 PROBLEM — E11.9 DIABETES MELLITUS (HCC): Status: ACTIVE | Noted: 2018-05-23

## 2018-05-23 PROCEDURE — 1123F ACP DISCUSS/DSCN MKR DOCD: CPT | Performed by: INTERNAL MEDICINE

## 2018-05-23 PROCEDURE — 1036F TOBACCO NON-USER: CPT | Performed by: INTERNAL MEDICINE

## 2018-05-23 PROCEDURE — 93000 ELECTROCARDIOGRAM COMPLETE: CPT | Performed by: INTERNAL MEDICINE

## 2018-05-23 PROCEDURE — G8598 ASA/ANTIPLAT THER USED: HCPCS | Performed by: INTERNAL MEDICINE

## 2018-05-23 PROCEDURE — 99213 OFFICE O/P EST LOW 20 MIN: CPT | Performed by: INTERNAL MEDICINE

## 2018-05-23 PROCEDURE — G8417 CALC BMI ABV UP PARAM F/U: HCPCS | Performed by: INTERNAL MEDICINE

## 2018-05-23 PROCEDURE — 4040F PNEUMOC VAC/ADMIN/RCVD: CPT | Performed by: INTERNAL MEDICINE

## 2018-05-23 PROCEDURE — G8427 DOCREV CUR MEDS BY ELIG CLIN: HCPCS | Performed by: INTERNAL MEDICINE

## 2018-05-23 PROCEDURE — 1090F PRES/ABSN URINE INCON ASSESS: CPT | Performed by: INTERNAL MEDICINE

## 2018-05-23 PROCEDURE — G8400 PT W/DXA NO RESULTS DOC: HCPCS | Performed by: INTERNAL MEDICINE

## 2018-05-23 RX ORDER — POTASSIUM CHLORIDE 750 MG/1
10 TABLET, EXTENDED RELEASE ORAL DAILY
COMMUNITY
End: 2018-12-31 | Stop reason: SDUPTHER

## 2018-05-23 RX ORDER — WARFARIN SODIUM 2.5 MG/1
2.5 TABLET ORAL DAILY
COMMUNITY
End: 2019-08-27 | Stop reason: SDUPTHER

## 2018-05-23 RX ORDER — CLORAZEPATE DIPOTASSIUM 7.5 MG/1
7.5 TABLET ORAL NIGHTLY
COMMUNITY

## 2018-05-23 RX ORDER — LEVOTHYROXINE SODIUM 0.07 MG/1
75 TABLET ORAL DAILY
COMMUNITY

## 2018-05-23 RX ORDER — GLYBURIDE 5 MG/1
5 TABLET ORAL 2 TIMES DAILY WITH MEALS
COMMUNITY

## 2018-05-23 RX ORDER — PRAVASTATIN SODIUM 80 MG/1
80 TABLET ORAL NIGHTLY
COMMUNITY
End: 2018-05-23

## 2018-05-23 ASSESSMENT — ENCOUNTER SYMPTOMS
CHOKING: 0
SHORTNESS OF BREATH: 1
STRIDOR: 0
CHEST TIGHTNESS: 0
NAUSEA: 0
APNEA: 0
BACK PAIN: 0
WHEEZING: 0
BLOOD IN STOOL: 0
ABDOMINAL DISTENTION: 0

## 2018-05-24 DIAGNOSIS — I51.3 APICAL MURAL THROMBUS: Primary | ICD-10-CM

## 2018-05-29 DIAGNOSIS — E78.2 MIXED HYPERLIPIDEMIA: ICD-10-CM

## 2018-05-29 DIAGNOSIS — N18.30 CKD (CHRONIC KIDNEY DISEASE) STAGE 3, GFR 30-59 ML/MIN (HCC): ICD-10-CM

## 2018-05-30 ENCOUNTER — ANTI-COAG VISIT (OUTPATIENT)
Dept: CARDIOLOGY | Age: 78
End: 2018-05-30

## 2018-05-30 LAB — INR BLD: 3

## 2018-06-06 ENCOUNTER — ANTI-COAG VISIT (OUTPATIENT)
Dept: CARDIOLOGY | Age: 78
End: 2018-06-06

## 2018-06-06 LAB — INR BLD: 2

## 2018-06-11 ENCOUNTER — ANTI-COAG VISIT (OUTPATIENT)
Dept: CARDIOLOGY | Age: 78
End: 2018-06-11

## 2018-06-11 LAB — INR BLD: 1.5

## 2018-06-19 ENCOUNTER — ANTI-COAG VISIT (OUTPATIENT)
Dept: CARDIOLOGY | Age: 78
End: 2018-06-19

## 2018-06-19 LAB — INR BLD: 1.9

## 2018-06-26 ENCOUNTER — ANTI-COAG VISIT (OUTPATIENT)
Dept: CARDIOLOGY | Age: 78
End: 2018-06-26

## 2018-06-26 LAB — INR BLD: 2.3

## 2018-06-27 ENCOUNTER — OFFICE VISIT (OUTPATIENT)
Dept: CARDIOLOGY | Age: 78
End: 2018-06-27
Payer: MEDICARE

## 2018-06-27 VITALS
HEIGHT: 62 IN | BODY MASS INDEX: 26.5 KG/M2 | WEIGHT: 144 LBS | SYSTOLIC BLOOD PRESSURE: 98 MMHG | DIASTOLIC BLOOD PRESSURE: 60 MMHG | HEART RATE: 66 BPM

## 2018-06-27 DIAGNOSIS — I50.9 CONGESTIVE HEART FAILURE, UNSPECIFIED CONGESTIVE HEART FAILURE CHRONICITY, UNSPECIFIED CONGESTIVE HEART FAILURE TYPE: Primary | ICD-10-CM

## 2018-06-27 DIAGNOSIS — I25.119 CORONARY ARTERY DISEASE INVOLVING NATIVE CORONARY ARTERY OF NATIVE HEART WITH ANGINA PECTORIS (HCC): ICD-10-CM

## 2018-06-27 DIAGNOSIS — I10 ESSENTIAL HYPERTENSION: ICD-10-CM

## 2018-06-27 PROBLEM — Z72.0 TOBACCO ABUSE: Status: RESOLVED | Noted: 2018-05-23 | Resolved: 2018-06-27

## 2018-06-27 PROCEDURE — G8427 DOCREV CUR MEDS BY ELIG CLIN: HCPCS | Performed by: INTERNAL MEDICINE

## 2018-06-27 PROCEDURE — G8417 CALC BMI ABV UP PARAM F/U: HCPCS | Performed by: INTERNAL MEDICINE

## 2018-06-27 PROCEDURE — 1090F PRES/ABSN URINE INCON ASSESS: CPT | Performed by: INTERNAL MEDICINE

## 2018-06-27 PROCEDURE — G8400 PT W/DXA NO RESULTS DOC: HCPCS | Performed by: INTERNAL MEDICINE

## 2018-06-27 PROCEDURE — 99213 OFFICE O/P EST LOW 20 MIN: CPT | Performed by: INTERNAL MEDICINE

## 2018-06-27 PROCEDURE — 1036F TOBACCO NON-USER: CPT | Performed by: INTERNAL MEDICINE

## 2018-06-27 PROCEDURE — 93000 ELECTROCARDIOGRAM COMPLETE: CPT | Performed by: INTERNAL MEDICINE

## 2018-06-27 PROCEDURE — 1123F ACP DISCUSS/DSCN MKR DOCD: CPT | Performed by: INTERNAL MEDICINE

## 2018-06-27 PROCEDURE — G8598 ASA/ANTIPLAT THER USED: HCPCS | Performed by: INTERNAL MEDICINE

## 2018-06-27 PROCEDURE — 4040F PNEUMOC VAC/ADMIN/RCVD: CPT | Performed by: INTERNAL MEDICINE

## 2018-07-02 ENCOUNTER — ANTI-COAG VISIT (OUTPATIENT)
Dept: CARDIOLOGY | Age: 78
End: 2018-07-02

## 2018-07-02 LAB — INR BLD: 2.9

## 2018-07-09 ENCOUNTER — ANTI-COAG VISIT (OUTPATIENT)
Dept: CARDIOLOGY | Age: 78
End: 2018-07-09

## 2018-07-09 LAB — INR BLD: 2.3

## 2018-07-17 LAB — INR BLD: 2

## 2018-07-18 ENCOUNTER — ANTI-COAG VISIT (OUTPATIENT)
Dept: CARDIOLOGY | Age: 78
End: 2018-07-18

## 2018-07-24 LAB — INR BLD: 2.4

## 2018-07-25 ENCOUNTER — ANTI-COAG VISIT (OUTPATIENT)
Dept: CARDIOLOGY | Age: 78
End: 2018-07-25

## 2018-07-31 ENCOUNTER — ANTI-COAG VISIT (OUTPATIENT)
Dept: CARDIOLOGY | Age: 78
End: 2018-07-31

## 2018-07-31 LAB — INR BLD: 3.2

## 2018-08-06 ENCOUNTER — ANTI-COAG VISIT (OUTPATIENT)
Dept: CARDIOLOGY | Age: 78
End: 2018-08-06

## 2018-08-06 LAB — INR BLD: 2.6

## 2018-08-14 ENCOUNTER — ANTI-COAG VISIT (OUTPATIENT)
Dept: CARDIOLOGY | Age: 78
End: 2018-08-14

## 2018-08-14 LAB — INR BLD: 3.5

## 2018-08-20 ENCOUNTER — TELEPHONE (OUTPATIENT)
Dept: CARDIOLOGY | Age: 78
End: 2018-08-20

## 2018-08-20 ENCOUNTER — ANTI-COAG VISIT (OUTPATIENT)
Dept: CARDIOLOGY | Age: 78
End: 2018-08-20

## 2018-08-20 LAB — INR BLD: 5.7

## 2018-08-22 ENCOUNTER — ANTI-COAG VISIT (OUTPATIENT)
Dept: CARDIOLOGY | Age: 78
End: 2018-08-22

## 2018-08-22 LAB — INR BLD: 2.4

## 2018-08-23 ENCOUNTER — ANTI-COAG VISIT (OUTPATIENT)
Dept: CARDIOLOGY | Age: 78
End: 2018-08-23

## 2018-08-27 ENCOUNTER — ANTI-COAG VISIT (OUTPATIENT)
Dept: CARDIOLOGY | Age: 78
End: 2018-08-27

## 2018-08-27 LAB — INR BLD: 1.4

## 2018-08-30 ENCOUNTER — TELEPHONE (OUTPATIENT)
Dept: CARDIOLOGY | Age: 78
End: 2018-08-30

## 2018-08-30 ENCOUNTER — ANTI-COAG VISIT (OUTPATIENT)
Dept: CARDIOLOGY | Age: 78
End: 2018-08-30

## 2018-08-30 LAB — INR BLD: 1.3

## 2018-09-04 ENCOUNTER — ANTI-COAG VISIT (OUTPATIENT)
Dept: CARDIOLOGY | Age: 78
End: 2018-09-04

## 2018-09-04 LAB — INR BLD: 2

## 2018-09-10 ENCOUNTER — ANTI-COAG VISIT (OUTPATIENT)
Dept: CARDIOLOGY | Age: 78
End: 2018-09-10

## 2018-09-10 LAB — INR BLD: 1.7

## 2018-09-17 ENCOUNTER — ANTI-COAG VISIT (OUTPATIENT)
Dept: CARDIOLOGY | Age: 78
End: 2018-09-17

## 2018-09-17 LAB — INR BLD: 2.5

## 2018-09-24 ENCOUNTER — ANTI-COAG VISIT (OUTPATIENT)
Dept: CARDIOLOGY | Age: 78
End: 2018-09-24

## 2018-09-24 LAB — INR BLD: 3.3

## 2018-10-01 ENCOUNTER — ANTI-COAG VISIT (OUTPATIENT)
Dept: CARDIOLOGY | Age: 78
End: 2018-10-01

## 2018-10-01 LAB — INR BLD: 2.2

## 2018-10-02 ENCOUNTER — OFFICE VISIT (OUTPATIENT)
Dept: CARDIOLOGY | Age: 78
End: 2018-10-02
Payer: MEDICARE

## 2018-10-02 VITALS
HEIGHT: 62 IN | SYSTOLIC BLOOD PRESSURE: 128 MMHG | BODY MASS INDEX: 25.58 KG/M2 | DIASTOLIC BLOOD PRESSURE: 72 MMHG | WEIGHT: 139 LBS | HEART RATE: 67 BPM

## 2018-10-02 DIAGNOSIS — I50.82 BIVENTRICULAR CONGESTIVE HEART FAILURE (HCC): Primary | ICD-10-CM

## 2018-10-02 PROCEDURE — G8417 CALC BMI ABV UP PARAM F/U: HCPCS | Performed by: INTERNAL MEDICINE

## 2018-10-02 PROCEDURE — G8484 FLU IMMUNIZE NO ADMIN: HCPCS | Performed by: INTERNAL MEDICINE

## 2018-10-02 PROCEDURE — 1090F PRES/ABSN URINE INCON ASSESS: CPT | Performed by: INTERNAL MEDICINE

## 2018-10-02 PROCEDURE — 1123F ACP DISCUSS/DSCN MKR DOCD: CPT | Performed by: INTERNAL MEDICINE

## 2018-10-02 PROCEDURE — G8598 ASA/ANTIPLAT THER USED: HCPCS | Performed by: INTERNAL MEDICINE

## 2018-10-02 PROCEDURE — G8427 DOCREV CUR MEDS BY ELIG CLIN: HCPCS | Performed by: INTERNAL MEDICINE

## 2018-10-02 PROCEDURE — 4040F PNEUMOC VAC/ADMIN/RCVD: CPT | Performed by: INTERNAL MEDICINE

## 2018-10-02 PROCEDURE — 1036F TOBACCO NON-USER: CPT | Performed by: INTERNAL MEDICINE

## 2018-10-02 PROCEDURE — 99212 OFFICE O/P EST SF 10 MIN: CPT | Performed by: INTERNAL MEDICINE

## 2018-10-02 PROCEDURE — G8400 PT W/DXA NO RESULTS DOC: HCPCS | Performed by: INTERNAL MEDICINE

## 2018-10-02 PROCEDURE — 1101F PT FALLS ASSESS-DOCD LE1/YR: CPT | Performed by: INTERNAL MEDICINE

## 2018-10-02 NOTE — LETTER
Dear Luis Owen MD (Inactive),     Patient Active Problem List   Diagnosis    CKD (chronic kidney disease) stage 4, GFR 15-29 ml/min [facility])    [facility]estive [facility] failure) [facility]    CAD (coronary artery disease)    Hyperlipidemia    Hypertension    Carotid artery stenosis    PVD (peripheral vascular disease) ([facility] [address]    Open toe wound    Diabetes mellitus ([facility] [address]    Apical mural thrombus       Mrs. 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 It is complicated by Stage III CKD. Clinically she is unchanged - sedentary without orthopnea or PND. Her weights fluctuate about 2-4 pounds in each direction without recent rise. She is troubled by lower extremity edema and apparently is reluctant to elevate her legs for any significant period of time. On exam her pressure is 128/72 with a pulse of 67. No JVD at 90 degrees. There are bibasilar rales and 2-3+ lower extremity edema. She sees Dr. Winsome Purvis tomorrow in renal follow up. The last creatinine I have is 1.92. Hopefully he can foster some additional gentle diuresis as I suspect she has about 6-10 pounds of excess fluid.        Sincerely yours,    Erum Villafuerte MD  [facility]

## 2018-10-08 ENCOUNTER — ANTI-COAG VISIT (OUTPATIENT)
Dept: CARDIOLOGY | Age: 78
End: 2018-10-08

## 2018-10-08 LAB — INR BLD: 1.9

## 2018-10-15 ENCOUNTER — ANTI-COAG VISIT (OUTPATIENT)
Dept: CARDIOLOGY | Age: 78
End: 2018-10-15

## 2018-10-15 LAB — INR BLD: 2.3

## 2018-10-17 DIAGNOSIS — I50.9 OTHER CONGESTIVE HEART FAILURE (HCC): Primary | ICD-10-CM

## 2018-10-17 RX ORDER — FUROSEMIDE 80 MG
80 TABLET ORAL 2 TIMES DAILY
Qty: 180 TABLET | Refills: 3 | Status: SHIPPED | OUTPATIENT
Start: 2018-10-17

## 2018-10-22 ENCOUNTER — ANTI-COAG VISIT (OUTPATIENT)
Dept: CARDIOLOGY | Age: 78
End: 2018-10-22

## 2018-10-22 LAB — INR BLD: 2.5

## 2018-10-29 ENCOUNTER — ANTI-COAG VISIT (OUTPATIENT)
Dept: CARDIOLOGY | Age: 78
End: 2018-10-29

## 2018-10-29 LAB — INR BLD: 2.5

## 2018-11-05 ENCOUNTER — ANTI-COAG VISIT (OUTPATIENT)
Dept: CARDIOLOGY | Age: 78
End: 2018-11-05

## 2018-11-05 LAB — INR BLD: 1.9

## 2018-11-12 LAB — INR BLD: 2.1

## 2018-11-13 ENCOUNTER — ANTI-COAG VISIT (OUTPATIENT)
Dept: CARDIOLOGY | Age: 78
End: 2018-11-13

## 2018-11-19 ENCOUNTER — ANTI-COAG VISIT (OUTPATIENT)
Dept: CARDIOLOGY | Age: 78
End: 2018-11-19

## 2018-11-19 LAB — INR BLD: 2.5

## 2018-11-21 NOTE — THERAPY TREATMENT NOTE
Acute Care - Physical Therapy Treatment Note  [facility]     Patient Name: Dorcas Bain  : 1940  MRN: [id number]  Today's Date: 3/12/2018     Date of Referral to PT: 18       Admit Date: 3/6/2018    Visit Dx:    ICD-10-CM ICD-9-CM   1. Impaired physical mobility Z74.09 781.99   2. Impaired mobility and activities of daily living Z74.09 799.89     Patient Active Problem List   Diagnosis   • MELODY (acute kidney injury)       Therapy Treatment    Therapy Treatment / Health Promotion    Treatment Time/Intention  Discipline: physical therapy assistant  Document Type: therapy note (daily note)  Subjective Information: complains of, pain (But agreed to LE ther ex in bed.)  Mode of Treatment: physical therapy  Patient/Family Observations: Pt supine with HOB elevated.  Care Plan Review, Other Participant(s): spouse  Comment: Pt declined tx attempt initially but with extensive encouragement pt agreed to LE TE in bed.  Reason Treatment Not Performed: patient/family declined treatment (Pt states she just got her pain med and she doesnt want to do anything right now. Pt okay with PTA checking back in pm.)  Plan of Care Review  Plan of Care Reviewed With: patient, spouse    Vitals/Pain/Safety  Vital Signs  Pre Systolic BP Rehab: 142  Pre Treatment Diastolic BP: 56  Post Systolic BP Rehab: 146  Post Treatment Diastolic BP: 58  Pretreatment Heart Rate (beats/min): 58  Intratreatment Heart Rate (beats/min): 64  Posttreatment Heart Rate (beats/min): 60  Pre SpO2 (%): 99  O2 Delivery Pre Treatment: supplemental O2  Intra SpO2 (%): 98  Post SpO2 (%): 98  Pre Patient Position: Supine  Intra Patient Position: Supine  Post Patient Position: Supine    Mobility,ADL,Motor, Modality  Bed Mobility Assessment/Treatment  Rolling Left [place] (Bed Mobility): not tested  Rolling Right [place] (Bed Mobility): not tested  Scooting/Bridging [place] (Bed Mobility): not tested  Supine-Sit [place] (Bed Mobility): not  Ventricular Rate : 61   Atrial Rate : 61   P-R Interval : 146   QRS Duration : 82   Q-T Interval : 438   QTC Calculation(Bezet) : 440   P Axis : 44   R Axis : 59   T Axis : 72   Diagnosis : Normal sinus rhythm~Normal ECG~When compared with ECG of 21-SEP-2005 09:03,~No significant change was found~Confirmed by MONE ROMAN ([id number]) on 1/6/2018 5:26:48 PM      tested  Sit-Supine [place] (Bed Mobility): not tested  Sit-Stand Transfer  Sit-Stand [place] (Transfers): not tested  Stand-Sit Transfer  Stand-Sit [place] (Transfers): not tested     Therapeutic Exercise  Therapeutic Exercise: supine, lower extremities  Lower Extremity Supine Therapeutic Exercise  Performed, Supine Lower Extremity (Therapeutic Exercise): hip abduction/adduction, knee flexion/extension, ankle dorsiflexion/plantarflexion, quadriceps sets, gluteal sets, ankle pumps  Exercise Type, Supine Lower Extremity (Therapeutic Exercise): AAROM (active assistive range of motion), AROM (active range of motion)  Sets/Reps Detail, Supine Lower Extremity (Therapeutic Exercise): 2sets of 5-10 reps of each  Therapeutic Exercise  Lower Extremity (Therapeutic Exercise): gluteal sets, heel slides, bilateral, quad sets, bilateral, SAQ (short arc quad), bilateral (B AP's and B hip Abd/Add with assistance.)           ROM/MMT             Sensory, Edema, Orthotics          Cognition, Communication, Swallow  Cognitive Assessment/Intervention- PT/OT  Affect/Mental Status (Cognitive): WNL  Behavioral Issues (Cognitive): uncooperative  Orientation Status (Cognition): oriented x 4  Speaking Valve  Pretreatment Heart Rate (beats/min): 58  Pre SpO2 (%): 99  Posttreatment Heart Rate (beats/min): 60  Post SpO2 (%): 98  General Eating/Swallowing Observations  Pre SpO2 (%): 99  Post SpO2 (%): 98    Outcome Summary  Outcome Summary/Treatment Plan (PT)  Daily Summary of Progress (PT): unable to show any progress toward functional goals  Barriers to Overall Progress (PT): Pt not willingly cooperative with therapy.  Plan for Continued Treatment (PT): Cont with OOB as pt will allow.  Anticipated Discharge Disposition (PT): skilled nursing facility (SNF)            PT Rehab Goals     Row Name 03/12/18 1355 03/11/18 0845 03/10/18 1300       Bed Mobility Goal 1 (PT)    Activity/Assistive Device (Bed Mobility Goal 1, PT) sit to  supine/supine to sit  -TW sit to supine;supine to sit  -AM sit to supine/supine to sit  -SM    [place] Level/Cues Needed (Bed Mobility Goal 1, PT) contact guard assist  -TW contact guard assist  -AM contact guard assist  -SM    Time Frame (Bed Mobility Goal 1, PT) 2 days;3 days  -TW 2 days;3 days  -AM 2 days;3 days  -SM    Barriers (Bed Mobility Goal 1, PT) HOB downand no handrails, head propped with no pillows  -TW HOB down and no rails; head propped w/multiple pillows  -AM HOB down and no rails, head propped with multiple pillows  -SM    Progress/Outcomes (Bed Mobility Goal 1, PT) progress slower than expected  -TW goal ongoing  -AM goal ongoing  -SM       Transfer Goal 1 (PT)    Activity/Assistive Device (Transfer Goal 1, PT) sit-to-stand/stand-to-sit;bed-to-chair/chair-to-bed;cane, straight;walker, rolling  -TW sit-to-stand/stand-to-sit;bed-to-chair/chair-to-bed;walker, rolling;cane, straight  -AM bed-to-chair/chair-to-bed;sit-to-stand/stand-to-sit;walker, rolling;cane, straight  -SM    [place] Level/Cues Needed (Transfer Goal 1, PT) minimum assist (75% or more patient effort)  -TW minimum assist (75% or more patient effort)  -AM minimum assist (75% or more patient effort)  -SM    Time Frame (Transfer Goal 1, PT) 2 - 3 days  -TW 2 - 3 days  -AM 2 - 3 days  -SM    Progress/Outcome (Transfer Goal 1, PT) progress slower than expected  -TW goal ongoing  -AM goal ongoing  -SM       Gait Training Goal 1 (PT)    Activity/Assistive Device (Gait Training Goal 1, PT) gait (walking locomotion);cane, straight;walker, rolling  -TW gait (walking locomotion);walker, rolling;cane, straight  -AM gait (walking locomotion);walker, rolling;cane, straight  -SM    [place] Level (Gait Training Goal 1, PT) contact guard assist  -TW contact guard assist  -AM contact guard assist  -SM    Distance (Gait Goal 1, PT) 100'  -  -'  -SM    Time Frame (Gait Training Goal 1, PT) by discharge  -TW by discharge  -AM by  discharge  -SM    Progress/Outcome (Gait Training Goal 1, PT) progress slower than expected  -TW goal ongoing  -AM goal ongoing  -SM       Strength Goal 1 (PT)    Strength Goal 1 (PT) 20 reps all ex BLE AROM. For pt to be able to get BLE up onto bed ind  -TW 20 reps all ex BLE AROM. For pt to be able to get BLE up onto bed Ind  -AM 20 reps all ex BLE AROM, For pt to be able to get BLE up onto bed  Independently   -SM    Time Frame (Strength Goal 1, PT) 2 days;3 days  -TW 2 days;3 days  -AM 2 days;3 days  -SM    Progress/Outcome (Strength Goal 1, PT) progress slower than expected  -TW goal ongoing  -AM goal ongoing  -SM      User Key  (r) = Recorded By, (t) = Taken By, (c) = Cosigned By    Initials Name Provider Type    AM Imer Diaz, PTA Physical Therapy Assistant     Merna Mehta, PTA Physical Therapy Assistant     Kael Arriaga, PTA Physical Therapy Assistant          Physical Therapy Education     Title: PT OT SLP Therapies (Active)     Topic: Physical Therapy (Active)     Point: Mobility training (Active)    Learning Progress Summary     Learner Status Readiness Method Response Comment Documented by    Patient Active Acceptance E,TB,D NR pt and spouse educated on safety and use of gait belt. Spouse able to demonstrate proper use of belt to assist pt at home  03/10/18 1334     Active Acceptance D NR   03/09/18 1157    Family Active Acceptance D NR   03/09/18 1157    Significant Other Active Acceptance E,TB,D NR pt and spouse educated on safety and use of gait belt. Spouse able to demonstrate proper use of belt to assist pt at home  03/10/18 1334          Point: Precautions (Active)    Learning Progress Summary     Learner Status Readiness Method Response Comment Documented by    Patient Active Acceptance D NR  CB 03/09/18 1157    Family Active Acceptance D NR   03/09/18 1157                      User Key     Initials Effective Dates Name Provider Type Discipline     04/06/17 -   Melaine Larose, PT Physical Therapist PT     03/07/18 -  Merna Mehta, LENA Physical Therapy Assistant PT                    PT Recommendation and Plan  Anticipated Discharge Disposition (PT): skilled nursing facility (SNF)  Therapy Frequency (PT Clinical Impression): daily  Daily Summary of Progress (PT): unable to show any progress toward functional goals  Plan for Continued Treatment (PT): Cont with OOB as pt will allow.  Plan of Care Reviewed With: patient, spouse  Progress: no change  Outcome Summary: Pt is self limiting progression due to non participation with therapy. Pt demonstrates weak B LE's with ther ex in supine.          Outcome Measures     Row Name 03/12/18 1355 03/12/18 0848 03/11/18 0845       How much help from another person do you currently need...    Turning from your back to your side while in flat bed without using bedrails? 2  -TW  -- 2  -AM    Moving from lying on back to sitting on the side of a flat bed without bedrails? 2  -TW  -- 2  -AM    Moving to and from a bed to a chair (including a wheelchair)? 1  -TW  -- 1  -AM    Standing up from a chair using your arms (e.g., wheelchair, bedside chair)? 2  -TW  -- 2  -AM    Climbing 3-5 steps with a railing? 1  -TW  -- 1  -AM    To walk in hospital room? 1  -TW  -- 1  -AM    AM-PAC 6 Clicks Score 9  -TW  -- 9  -AM       How much help from another is currently needed...    Putting on and taking off regular lower body clothing?  -- 1  -RC  --    Bathing (including washing, rinsing, and drying)  -- 2  -RC  --    Toileting (which includes using toilet bed pan or urinal)  -- 2  -RC  --    Putting on and taking off regular upper body clothing  -- 2  -RC  --    Taking care of personal grooming (such as brushing teeth)  -- 3  -RC  --    Eating meals  -- 3  -RC  --    Score  -- 13  -RC  --       Functional Assessment    Outcome Measure Options AM-PAC 6 Clicks Basic Mobility (PT)  -TW  -- AM-PAC 6 Clicks Basic Mobility (PT)  -AM    Row Name  03/10/18 0855             How much help from another is currently needed...    Putting on and taking off regular lower body clothing? 1  -      Bathing (including washing, rinsing, and drying) 2  -BH      Toileting (which includes using toilet bed pan or urinal) 2  -BH      Putting on and taking off regular upper body clothing 2  -BH      Taking care of personal grooming (such as brushing teeth) 3  -      Eating meals 3  -      Score 13  -         Functional Assessment    Outcome Measure Options AM-PAC 6 Clicks Daily Activity (OT)  -        User Key  (r) = Recorded By, (t) = Taken By, (c) = Cosigned By    Initials Name Provider Type     Germaine Avila, OTR/L Occupational Therapist    AM Imer Diaz, LENA Physical Therapy Assistant     Kael Arriaga PTA Physical Therapy Assistant    JEFERSON Steve, CALLAWAY/L Occupational Therapy Assistant           Time Calculation:         PT Charges     Row Name 03/12/18 1517             Time Calculation    Start Time 1355  -TW      Stop Time 1412  -TW      Time Calculation (min) 17 min  -TW      PT Received On 03/12/18  -TW      PT Goal Re-Cert Due Date 03/22/18  -TW         Time Calculation- PT    Total Timed Code Minutes- PT 17 minute(s)  -TW        User Key  (r) = Recorded By, (t) = Taken By, (c) = Cosigned By    Initials Name Provider Type     Kael Arriaga PTA Physical Therapy Assistant          Therapy Charges for Today     Code Description Service Date Service Provider Modifiers Qty    23704276432 HC PT THER SUPP EA 15 MIN 3/12/2018 Kael Arriaga PTA GP 1    25276577366 HC PT THER SUPP EA 15 MIN 3/12/2018 Kael Arriaga PTA GP 1    26823097281 HC PT THER PROC EA 15 MIN 3/12/2018 Kael Arriaga PTA GP 1          PT G-Codes  PT Professional Judgement Used?: Yes  Outcome Measure Options: AM-PAC 6 Clicks Basic Mobility (PT)  Score: 8  Functional Limitation: Mobility: Walking and moving around  Mobility: Walking and Moving Around  Current Status (): At least 80 percent but less than 100 percent impaired, limited or restricted  Mobility: Walking and Moving Around Goal Status (): At least 60 percent but less than 80 percent impaired, limited or restricted    Kael Arriaga, PTA  3/12/2018

## 2018-11-26 ENCOUNTER — ANTI-COAG VISIT (OUTPATIENT)
Dept: CARDIOLOGY | Age: 78
End: 2018-11-26

## 2018-11-26 LAB — INR BLD: 2

## 2018-12-03 ENCOUNTER — ANTI-COAG VISIT (OUTPATIENT)
Dept: CARDIOLOGY | Age: 78
End: 2018-12-03

## 2018-12-03 LAB — INR BLD: 1.9

## 2018-12-10 ENCOUNTER — ANTI-COAG VISIT (OUTPATIENT)
Dept: CARDIOLOGY | Age: 78
End: 2018-12-10

## 2018-12-10 LAB — INR BLD: 2.5

## 2018-12-18 ENCOUNTER — ANTI-COAG VISIT (OUTPATIENT)
Dept: CARDIOLOGY | Age: 78
End: 2018-12-18

## 2018-12-18 LAB — INR BLD: 2

## 2018-12-31 DIAGNOSIS — I50.9 OTHER CONGESTIVE HEART FAILURE (HCC): Primary | ICD-10-CM

## 2018-12-31 DIAGNOSIS — N18.4 CKD (CHRONIC KIDNEY DISEASE) STAGE 4, GFR 15-29 ML/MIN (HCC): ICD-10-CM

## 2018-12-31 RX ORDER — POTASSIUM CHLORIDE 750 MG/1
10 TABLET, EXTENDED RELEASE ORAL DAILY
Qty: 180 TABLET | Refills: 3 | Status: SHIPPED | OUTPATIENT
Start: 2018-12-31

## 2019-01-01 LAB — INR BLD: 1.9

## 2019-01-02 ENCOUNTER — ANTI-COAG VISIT (OUTPATIENT)
Dept: CARDIOLOGY | Age: 79
End: 2019-01-02

## 2019-01-03 ENCOUNTER — OFFICE VISIT (OUTPATIENT)
Dept: CARDIOLOGY | Age: 79
End: 2019-01-03
Payer: MEDICARE

## 2019-01-03 VITALS
SYSTOLIC BLOOD PRESSURE: 138 MMHG | WEIGHT: 144 LBS | HEIGHT: 62 IN | BODY MASS INDEX: 26.5 KG/M2 | HEART RATE: 74 BPM | DIASTOLIC BLOOD PRESSURE: 88 MMHG

## 2019-01-03 DIAGNOSIS — I10 ESSENTIAL HYPERTENSION: ICD-10-CM

## 2019-01-03 DIAGNOSIS — N18.4 CKD (CHRONIC KIDNEY DISEASE) STAGE 4, GFR 15-29 ML/MIN (HCC): ICD-10-CM

## 2019-01-03 DIAGNOSIS — I25.10 CORONARY ARTERY DISEASE INVOLVING NATIVE CORONARY ARTERY OF NATIVE HEART WITHOUT ANGINA PECTORIS: ICD-10-CM

## 2019-01-03 DIAGNOSIS — E78.2 MIXED HYPERLIPIDEMIA: ICD-10-CM

## 2019-01-03 DIAGNOSIS — I50.9 OTHER CONGESTIVE HEART FAILURE (HCC): Primary | ICD-10-CM

## 2019-01-03 PROCEDURE — 4040F PNEUMOC VAC/ADMIN/RCVD: CPT | Performed by: NURSE PRACTITIONER

## 2019-01-03 PROCEDURE — 93000 ELECTROCARDIOGRAM COMPLETE: CPT | Performed by: NURSE PRACTITIONER

## 2019-01-03 PROCEDURE — G8598 ASA/ANTIPLAT THER USED: HCPCS | Performed by: NURSE PRACTITIONER

## 2019-01-03 PROCEDURE — G8400 PT W/DXA NO RESULTS DOC: HCPCS | Performed by: NURSE PRACTITIONER

## 2019-01-03 PROCEDURE — 1090F PRES/ABSN URINE INCON ASSESS: CPT | Performed by: NURSE PRACTITIONER

## 2019-01-03 PROCEDURE — 1036F TOBACCO NON-USER: CPT | Performed by: NURSE PRACTITIONER

## 2019-01-03 PROCEDURE — G8427 DOCREV CUR MEDS BY ELIG CLIN: HCPCS | Performed by: NURSE PRACTITIONER

## 2019-01-03 PROCEDURE — 99213 OFFICE O/P EST LOW 20 MIN: CPT | Performed by: NURSE PRACTITIONER

## 2019-01-03 PROCEDURE — G8484 FLU IMMUNIZE NO ADMIN: HCPCS | Performed by: NURSE PRACTITIONER

## 2019-01-03 PROCEDURE — 1123F ACP DISCUSS/DSCN MKR DOCD: CPT | Performed by: NURSE PRACTITIONER

## 2019-01-03 PROCEDURE — G8417 CALC BMI ABV UP PARAM F/U: HCPCS | Performed by: NURSE PRACTITIONER

## 2019-01-03 PROCEDURE — 1101F PT FALLS ASSESS-DOCD LE1/YR: CPT | Performed by: NURSE PRACTITIONER

## 2019-01-03 RX ORDER — WARFARIN SODIUM 5 MG/1
5 TABLET ORAL PRN
COMMUNITY

## 2019-01-03 RX ORDER — CLONIDINE HYDROCHLORIDE 0.2 MG/1
0.2 TABLET ORAL
COMMUNITY
End: 2019-05-07 | Stop reason: SDUPTHER

## 2019-01-03 RX ORDER — METOLAZONE 2.5 MG/1
2.5 TABLET ORAL PRN
COMMUNITY

## 2019-01-03 RX ORDER — CARVEDILOL 12.5 MG/1
12.5 TABLET ORAL 2 TIMES DAILY
COMMUNITY

## 2019-01-07 ENCOUNTER — ANTI-COAG VISIT (OUTPATIENT)
Dept: CARDIOLOGY | Age: 79
End: 2019-01-07

## 2019-01-07 LAB — INR BLD: 2.1

## 2019-01-14 ENCOUNTER — ANTI-COAG VISIT (OUTPATIENT)
Dept: CARDIOLOGY | Age: 79
End: 2019-01-14

## 2019-01-14 LAB — INR BLD: 1.6

## 2019-01-21 ENCOUNTER — ANTI-COAG VISIT (OUTPATIENT)
Dept: CARDIOLOGY | Age: 79
End: 2019-01-21

## 2019-01-21 LAB — INR BLD: 3.4

## 2019-01-29 ENCOUNTER — ANTI-COAG VISIT (OUTPATIENT)
Dept: CARDIOLOGY | Age: 79
End: 2019-01-29

## 2019-01-29 LAB — INR BLD: 1.8

## 2019-02-05 ENCOUNTER — ANTI-COAG VISIT (OUTPATIENT)
Dept: CARDIOLOGY | Age: 79
End: 2019-02-05

## 2019-02-05 LAB — INR BLD: 1.8

## 2019-02-12 ENCOUNTER — ANTI-COAG VISIT (OUTPATIENT)
Dept: CARDIOLOGY | Age: 79
End: 2019-02-12

## 2019-02-12 LAB — INR BLD: 2.1

## 2019-02-19 ENCOUNTER — ANTI-COAG VISIT (OUTPATIENT)
Dept: CARDIOLOGY | Age: 79
End: 2019-02-19

## 2019-02-19 LAB — INR BLD: 1.9

## 2019-02-26 ENCOUNTER — ANTI-COAG VISIT (OUTPATIENT)
Dept: CARDIOLOGY | Age: 79
End: 2019-02-26

## 2019-02-26 LAB — INR BLD: 1.8

## 2019-03-06 LAB — INR BLD: 2

## 2019-03-07 ENCOUNTER — ANTI-COAG VISIT (OUTPATIENT)
Dept: CARDIOLOGY | Age: 79
End: 2019-03-07

## 2019-03-21 ENCOUNTER — ANTI-COAG VISIT (OUTPATIENT)
Dept: CARDIOLOGY | Age: 79
End: 2019-03-21

## 2019-03-21 LAB — INR BLD: 1.9

## 2019-03-26 RX ORDER — WARFARIN SODIUM 1 MG/1
TABLET ORAL
Qty: 30 TABLET | Refills: 10 | Status: SHIPPED | OUTPATIENT
Start: 2019-03-26 | End: 2019-04-01 | Stop reason: SDUPTHER

## 2019-03-28 ENCOUNTER — ANTI-COAG VISIT (OUTPATIENT)
Dept: CARDIOLOGY | Age: 79
End: 2019-03-28

## 2019-03-28 LAB — INR BLD: 2.2

## 2019-04-01 RX ORDER — WARFARIN SODIUM 1 MG/1
TABLET ORAL
Qty: 90 TABLET | Refills: 3 | Status: SHIPPED | OUTPATIENT
Start: 2019-04-01

## 2019-04-04 LAB — INR BLD: 2.2

## 2019-04-05 ENCOUNTER — ANTI-COAG VISIT (OUTPATIENT)
Dept: CARDIOLOGY | Age: 79
End: 2019-04-05

## 2019-04-11 ENCOUNTER — TELEPHONE (OUTPATIENT)
Dept: CARDIOLOGY | Age: 79
End: 2019-04-11

## 2019-04-11 ENCOUNTER — ANTI-COAG VISIT (OUTPATIENT)
Dept: CARDIOLOGY | Age: 79
End: 2019-04-11

## 2019-04-11 LAB — INR BLD: 2.5

## 2019-04-11 NOTE — TELEPHONE ENCOUNTER
Spoke with patient and scheduling in the [facility] was declined. [facility]

## 2019-04-18 LAB — INR BLD: 2.1

## 2019-04-19 ENCOUNTER — ANTI-COAG VISIT (OUTPATIENT)
Dept: CARDIOLOGY | Age: 79
End: 2019-04-19

## 2019-04-26 ENCOUNTER — ANTI-COAG VISIT (OUTPATIENT)
Dept: CARDIOLOGY | Age: 79
End: 2019-04-26

## 2019-04-26 LAB — INR BLD: 1.9

## 2019-05-02 ENCOUNTER — ANTI-COAG VISIT (OUTPATIENT)
Dept: CARDIOLOGY | Age: 79
End: 2019-05-02

## 2019-05-02 LAB — INR BLD: 2.5

## 2019-05-07 ENCOUNTER — OFFICE VISIT (OUTPATIENT)
Dept: CARDIOLOGY | Age: 79
End: 2019-05-07
Payer: MEDICARE

## 2019-05-07 VITALS
BODY MASS INDEX: 25.21 KG/M2 | WEIGHT: 137 LBS | HEIGHT: 62 IN | SYSTOLIC BLOOD PRESSURE: 130 MMHG | HEART RATE: 68 BPM | DIASTOLIC BLOOD PRESSURE: 82 MMHG

## 2019-05-07 DIAGNOSIS — I25.10 CORONARY ARTERY DISEASE INVOLVING NATIVE CORONARY ARTERY OF NATIVE HEART WITHOUT ANGINA PECTORIS: ICD-10-CM

## 2019-05-07 DIAGNOSIS — I50.22 CHRONIC SYSTOLIC CONGESTIVE HEART FAILURE (HCC): Primary | ICD-10-CM

## 2019-05-07 DIAGNOSIS — I10 ESSENTIAL HYPERTENSION: ICD-10-CM

## 2019-05-07 DIAGNOSIS — E78.5 DYSLIPIDEMIA: ICD-10-CM

## 2019-05-07 DIAGNOSIS — N18.4 CKD (CHRONIC KIDNEY DISEASE) STAGE 4, GFR 15-29 ML/MIN (HCC): ICD-10-CM

## 2019-05-07 PROCEDURE — 1036F TOBACCO NON-USER: CPT | Performed by: NURSE PRACTITIONER

## 2019-05-07 PROCEDURE — G8427 DOCREV CUR MEDS BY ELIG CLIN: HCPCS | Performed by: NURSE PRACTITIONER

## 2019-05-07 PROCEDURE — 1090F PRES/ABSN URINE INCON ASSESS: CPT | Performed by: NURSE PRACTITIONER

## 2019-05-07 PROCEDURE — 99213 OFFICE O/P EST LOW 20 MIN: CPT | Performed by: NURSE PRACTITIONER

## 2019-05-07 PROCEDURE — G8400 PT W/DXA NO RESULTS DOC: HCPCS | Performed by: NURSE PRACTITIONER

## 2019-05-07 PROCEDURE — 4040F PNEUMOC VAC/ADMIN/RCVD: CPT | Performed by: NURSE PRACTITIONER

## 2019-05-07 PROCEDURE — G8417 CALC BMI ABV UP PARAM F/U: HCPCS | Performed by: NURSE PRACTITIONER

## 2019-05-07 PROCEDURE — 1123F ACP DISCUSS/DSCN MKR DOCD: CPT | Performed by: NURSE PRACTITIONER

## 2019-05-07 PROCEDURE — G8598 ASA/ANTIPLAT THER USED: HCPCS | Performed by: NURSE PRACTITIONER

## 2019-05-07 RX ORDER — CLONIDINE HYDROCHLORIDE 0.2 MG/1
0.2 TABLET ORAL DAILY
Qty: 60 TABLET | Refills: 5 | Status: SHIPPED | OUTPATIENT
Start: 2019-05-07 | End: 2019-05-08 | Stop reason: SDUPTHER

## 2019-05-07 NOTE — PATIENT INSTRUCTIONS
Weigh daily:  Learn what your \"dry\" or \"ideal\" weight is - Dry weight is your weight without extra water (fluid). Weigh yourself at the same time each day, preferably in the morning, in similar clothing, after urinating but before eating, and on the same scale. Record your weight in a diary or calendar. If you gain two pounds in a day or three pounds in two days, double your water pill until you are back down to your dry weight.

## 2019-05-07 NOTE — PROGRESS NOTES
Neurological: Negative for dizziness, tremors, speech difficulty, weakness and numbness. Hematological: Does not bruise/bleed easily. Psychiatric/Behavioral: Negative.         Past Medical History:   Diagnosis Date    Anemia     Asthma     CAD (coronary artery disease)     Carotid artery occlusion     CHF (congestive heart failure) (HCC)     Chronic kidney disease, stage IV (severe) (HCC)     Chronic respiratory failure (HCC)     Diabetes mellitus ([facility] [address]     Type 2    Hypercholesteremia     Hyperlipidemia     Hypertension     Intraparenchymal renal artery disease (HCC)     Peripheral vascular disease ([facility] [address]     Secondary hyperparathyroidism (of renal origin)        Past Surgical History:   Procedure Laterality Date    ANGIOPLASTY  05/25/06 [facility]    Right SFA and popliteal artery agioplasty with 6x20 cutting balloon    ANGIOPLASTY  07/06/06 [facility]    Left CFA endarterectomy with vein patch angioplasty and remote SFA endarterectomy with distal end-point stenting in proximal pop artery through the distal SFA with a 6x15 viabahn stent and completion angiography    CATARACT REMOVAL  January    CATARACT REMOVAL  March    CHOLECYSTECTOMY  1991    CORONARY ARTERY BYPASS GRAFT  07/06/2005 [facility]    6 vessel    HERNIA REPAIR  1991    OTHER SURGICAL HISTORY  03/09/07 [facility]    aortogram and run off    TUBAL LIGATION  1974       Family History   Problem Relation Age of Onset    Cancer Mother     Diabetes Mother     Cancer Father        Social History     Socioeconomic History    Marital status:      Spouse name: Not on file    Number of children: 11    Years of education: Not on file    Highest education level: Not on file   Occupational History    Not on file   Social Needs    Financial resource strain: Not on file    Food insecurity:     Worry: Not on file     Inability: Not on file    Transportation needs:     Medical: Not on file     Non-medical: Not on file   Tobacco Use    Smoking status: Former Smoker     Packs/day: 1.50     Years: 40.00     Pack years: 60.00     Last attempt to quit: 2005     Years since quittin.2    Smokeless tobacco: Never Used   Substance and Sexual Activity    Alcohol use: No    Drug use: No    Sexual activity: Not on file   Lifestyle    Physical activity:     Days per week: Not on file     Minutes per session: Not on file    Stress: Not on file   Relationships    Social connections:     Talks on phone: Not on file     Gets together: Not on file     Attends Advent service: Not on file     Active member of club or organization: Not on file     Attends meetings of clubs or organizations: Not on file     Relationship status: Not on file    Intimate partner violence:     Fear of current or ex partner: Not on file     Emotionally abused: Not on file     Physically abused: Not on file     Forced sexual activity: Not on file   Other Topics Concern    Not on file   Social History Narrative    Not on file       Allergies   Allergen Reactions    Entresto [Sacubitril-Valsartan] Itching    Iron Nausea And Vomiting         Current Outpatient Medications:     aspirin 81 MG tablet, Take 81 mg by mouth daily, Disp: , Rfl:     cloNIDine (CATAPRES) 0.2 MG tablet, Take 1 tablet by mouth daily Takes two tablets in PM, Disp: 60 tablet, Rfl: 5    warfarin (COUMADIN) 1 MG tablet, TAKE ONE TABLET BY MOUTH ONCE DAILY, Disp: 90 tablet, Rfl: 3    carvedilol (COREG) 12.5 MG tablet, Take 12.5 mg by mouth 2 times daily, Disp: , Rfl:     metolazone (ZAROXOLYN) 2.5 MG tablet, Take 2.5 mg by mouth as needed, Disp: , Rfl:     warfarin (COUMADIN) 5 MG tablet, Take 5 mg by mouth as needed, Disp: , Rfl:     potassium chloride (KLOR-CON M) 10 MEQ extended release tablet, Take 1 tablet by mouth daily, Disp: 180 tablet, Rfl: 3    furosemide (LASIX) 80 MG tablet, Take 1 tablet by mouth 2 times daily, Disp: 180 tablet, Rfl: 3    clorazepate (TRANXENE) 7.5 MG tablet, Take 7.5 mg by mouth nightly. ., Disp: , Rfl:     esomeprazole (NEXIUM) 20 MG delayed release capsule, Take 20 mg by mouth every morning (before breakfast), Disp: , Rfl:     glyBURIDE (DIABETA) 5 MG tablet, Take 5 mg by mouth 2 times daily (with meals), Disp: , Rfl:     levothyroxine (SYNTHROID) 75 MCG tablet, Take 75 mcg by mouth Daily, Disp: , Rfl:     warfarin (COUMADIN) 2.5 MG tablet, Take 2.5 mg by mouth daily , Disp: , Rfl:     HYDROcodone-acetaminophen (NORCO) 7.5-325 MG per tablet, Take 1 tablet by mouth every 6 hours as needed for Pain., Disp: , Rfl:     mupirocin (BACTROBAN) 2 % ointment, Apply topically 3 times daily Apply topically 3 times daily. , Disp: , Rfl:     insulin lispro (HUMALOG) 100 UNIT/ML injection vial, Inject into the skin 3 times daily (before meals), Disp: , Rfl:     ondansetron (ZOFRAN) 4 MG tablet, Take 4 mg by mouth every 8 hours as needed. , Disp: , Rfl:     Ergocalciferol (VITAMIN D2 PO), Take 50,000 Units by mouth once a week., Disp: , Rfl:     LANTUS 100 UNIT/ML injection, Inject 10 Units into the skin nightly as needed , Disp: , Rfl:     PE:  Vitals:    05/07/19 1410   BP: 130/82   Pulse: 68       Estimated body mass index is 25.06 kg/m² as calculated from the following:    Height as of this encounter: 5' 2\" (1.575 m). Weight as of this encounter: 137 lb (62.1 kg). Constitutional: She is oriented to person, place, and time. She appears well-developed and well-nourished in no acute distress. Head: Normocephalic and atraumatic. Neck:  Neck supple without JVD present. Cardiovascular: Normal rate, regular rhythm, normal heart sounds. no murmur ascultated. No gallop and no friction rub.  no carotid bruits. 1+ peripheral edema. Pulmonary/Chest:  Lungs clear to auscultation bilaterally without evidence of respiratory distress. She without wheezes. She without rales or ronchi. Musculoskeletal: Normal range of motion.   Gait is normal wheelchair/WC assitive device. Neurological: She is alert and oriented to person, place, and time. Skin: Skin is warm and dry without rash or pallor. Psychiatric: She has a normal mood and affect. Her behavior is normal. Thought content normal.     Lab Results   Component Value Date    CREATININE 2.5 05/11/2011    CREATININE 3.3 05/10/2011    CREATININE 3.9 05/09/2011    HGB 8.7 05/11/2011    HGB 9.5 05/10/2011    HGB 10.0 05/09/2011         Assessment    1. Chronic systolic congestive heart failure ([address]    2. CKD (chronic kidney disease) stage 4, GFR 15-29 ml/min ([facility])    3. Coronary artery disease involving native coronary artery of native heart without angina pectoris    4. Dyslipidemia    5. Essential hypertension          Plan:    HTN- controlled, no change  HLD- was on Pravastatin but patient states Dr. Nuvia Coyle took her off of it. PVD- vascular follows   CAD/CABG- on coumadin followed by Dr. Nuvia Coyle. On aspirin  ICM/CHF- Lasix 80mg twice dose. Zaroxolyn PRN-   Sees Dr. Moises Cooper- Nephrology next week for labs. Disposition - RTC in 4 months with Dr. Mabel Mckeon or sooner if needed    Please do not hesitate to contact me for any questions or concerns. Sincerely yours,    VILMA Sierra    This dictation was generated by voice recognition computer software. Although all attempts are made to edit dictation for accuracy, there may be errors in the transcription that are not intended.

## 2019-05-08 RX ORDER — CLONIDINE HYDROCHLORIDE 0.2 MG/1
TABLET ORAL
Qty: 135 TABLET | Refills: 3 | Status: SHIPPED | OUTPATIENT
Start: 2019-05-08

## 2019-05-09 ENCOUNTER — ANTI-COAG VISIT (OUTPATIENT)
Dept: CARDIOLOGY | Age: 79
End: 2019-05-09

## 2019-05-09 LAB — INR BLD: 2.2

## 2019-05-16 ENCOUNTER — ANTI-COAG VISIT (OUTPATIENT)
Dept: CARDIOLOGY | Age: 79
End: 2019-05-16

## 2019-05-16 LAB — INR BLD: 2

## 2019-05-23 ENCOUNTER — ANTI-COAG VISIT (OUTPATIENT)
Dept: CARDIOLOGY | Age: 79
End: 2019-05-23

## 2019-05-23 LAB — INR BLD: 2.3

## 2019-05-31 ENCOUNTER — ANTI-COAG VISIT (OUTPATIENT)
Dept: CARDIOLOGY | Age: 79
End: 2019-05-31

## 2019-05-31 LAB — INR BLD: 2.3

## 2019-06-06 ENCOUNTER — ANTI-COAG VISIT (OUTPATIENT)
Dept: CARDIOLOGY | Age: 79
End: 2019-06-06

## 2019-06-06 LAB — INR BLD: 2.5

## 2019-06-14 ENCOUNTER — ANTI-COAG VISIT (OUTPATIENT)
Dept: CARDIOLOGY | Age: 79
End: 2019-06-14

## 2019-06-14 LAB — INR BLD: 2.8

## 2019-06-20 ENCOUNTER — ANTI-COAG VISIT (OUTPATIENT)
Dept: CARDIOLOGY | Age: 79
End: 2019-06-20

## 2019-06-20 LAB — INR BLD: 2.9

## 2019-06-27 ENCOUNTER — ANTI-COAG VISIT (OUTPATIENT)
Dept: CARDIOLOGY | Age: 79
End: 2019-06-27

## 2019-06-27 LAB — INR BLD: 3.2

## 2019-07-11 ENCOUNTER — ANTI-COAG VISIT (OUTPATIENT)
Dept: CARDIOLOGY | Age: 79
End: 2019-07-11

## 2019-07-11 LAB — INR BLD: 2.4

## 2019-07-22 ENCOUNTER — ANTI-COAG VISIT (OUTPATIENT)
Dept: CARDIOLOGY | Age: 79
End: 2019-07-22

## 2019-07-22 LAB — INR BLD: 2.2

## 2019-07-25 LAB — INR BLD: 2.1

## 2019-07-26 ENCOUNTER — ANTI-COAG VISIT (OUTPATIENT)
Dept: CARDIOLOGY | Age: 79
End: 2019-07-26

## 2019-08-02 LAB — INR BLD: 2.4

## 2019-08-05 ENCOUNTER — ANTI-COAG VISIT (OUTPATIENT)
Dept: CARDIOLOGY | Age: 79
End: 2019-08-05

## 2019-08-08 LAB — INR BLD: 2.4

## 2019-08-09 ENCOUNTER — ANTI-COAG VISIT (OUTPATIENT)
Dept: CARDIOLOGY | Age: 79
End: 2019-08-09

## 2019-08-22 LAB — INR BLD: 2

## 2019-08-23 ENCOUNTER — ANTI-COAG VISIT (OUTPATIENT)
Dept: CARDIOLOGY | Age: 79
End: 2019-08-23

## 2019-08-27 RX ORDER — WARFARIN SODIUM 2.5 MG/1
2.5 TABLET ORAL DAILY
Qty: 90 TABLET | Refills: 3 | Status: SHIPPED | OUTPATIENT
Start: 2019-08-27 | End: 2019-09-03 | Stop reason: SDUPTHER

## 2019-08-29 ENCOUNTER — ANTI-COAG VISIT (OUTPATIENT)
Dept: CARDIOLOGY | Age: 79
End: 2019-08-29

## 2019-08-29 LAB — INR BLD: 1.9

## 2019-09-03 RX ORDER — WARFARIN SODIUM 2.5 MG/1
2.5 TABLET ORAL DAILY
Qty: 90 TABLET | Refills: 3 | Status: SHIPPED | OUTPATIENT
Start: 2019-09-03

## 2019-09-05 ENCOUNTER — TELEPHONE (OUTPATIENT)
Dept: CARDIOLOGY | Age: 79
End: 2019-09-05

## 2019-09-06 ENCOUNTER — ANTI-COAG VISIT (OUTPATIENT)
Dept: CARDIOLOGY | Age: 79
End: 2019-09-06

## 2019-09-06 LAB — INR BLD: 2

## 2019-09-10 ENCOUNTER — OFFICE VISIT (OUTPATIENT)
Dept: CARDIOLOGY | Age: 79
End: 2019-09-10
Payer: MEDICARE

## 2019-09-10 VITALS
DIASTOLIC BLOOD PRESSURE: 92 MMHG | HEIGHT: 62 IN | WEIGHT: 130 LBS | HEART RATE: 72 BPM | BODY MASS INDEX: 23.92 KG/M2 | SYSTOLIC BLOOD PRESSURE: 144 MMHG

## 2019-09-10 DIAGNOSIS — I10 ESSENTIAL HYPERTENSION: Primary | ICD-10-CM

## 2019-09-10 PROCEDURE — 93000 ELECTROCARDIOGRAM COMPLETE: CPT | Performed by: INTERNAL MEDICINE

## 2019-09-10 PROCEDURE — 1090F PRES/ABSN URINE INCON ASSESS: CPT | Performed by: INTERNAL MEDICINE

## 2019-09-10 PROCEDURE — 1036F TOBACCO NON-USER: CPT | Performed by: INTERNAL MEDICINE

## 2019-09-10 PROCEDURE — G8420 CALC BMI NORM PARAMETERS: HCPCS | Performed by: INTERNAL MEDICINE

## 2019-09-10 PROCEDURE — G8427 DOCREV CUR MEDS BY ELIG CLIN: HCPCS | Performed by: INTERNAL MEDICINE

## 2019-09-10 PROCEDURE — G8400 PT W/DXA NO RESULTS DOC: HCPCS | Performed by: INTERNAL MEDICINE

## 2019-09-10 PROCEDURE — 4040F PNEUMOC VAC/ADMIN/RCVD: CPT | Performed by: INTERNAL MEDICINE

## 2019-09-10 PROCEDURE — 99213 OFFICE O/P EST LOW 20 MIN: CPT | Performed by: INTERNAL MEDICINE

## 2019-09-10 PROCEDURE — 1123F ACP DISCUSS/DSCN MKR DOCD: CPT | Performed by: INTERNAL MEDICINE

## 2019-09-10 PROCEDURE — G8598 ASA/ANTIPLAT THER USED: HCPCS | Performed by: INTERNAL MEDICINE

## 2019-09-10 NOTE — PROGRESS NOTES
73-year-old lady with a history of dyslipidemia, hypertension, diabetes, peripheral vascular disease, stage IV chronic kidney disease, coronary disease, and severe left ventricular dysfunction returns for follow-up. Has become less ambulatory over the last year or so and in particular over the last couple weeks and now lacks the strength and coordination to walk. Her p.o. intake apparently has fallen and she's been spending much of her days in bed. Her weakness is not really lateralized - it is generalized. She denies chest discomfort or any symptoms would suggest dysrhythmia or overt failure. On exam she carries 130 pounds in a 5 foot 2 inch frame. Pressure is 144/92 with a pulse of 72 [tells me runs in the 120s at home]. Kyphotic with her head turned to the right and down because of cervical spine disease. I hear no carotid bruits though difficult. Chest clear to auscultation. S1-S2 normal without significant murmur or gallop. 2+ right lower extremity edema. Oriented and cognition appears normal as reflected by conversation. EKG reveals a sinus mechanism with a right bundle branch block and loss of anterior R waves consistent with previous anterior infarction. Assessment/plan:  1. CAD - no angina and no evidence of overt failure. 2.  Generalized weakness - most pressing problem as if the direction does not change she will and up back in a nursing home  3. Hypertension - elevated today though reportedly runs within normal limits at home    Records reviewed before the clinic visit.   More than 20 minutes spent face-to-face

## 2019-09-13 ENCOUNTER — ANTI-COAG VISIT (OUTPATIENT)
Dept: CARDIOLOGY | Age: 79
End: 2019-09-13

## 2019-09-13 LAB — INR BLD: 2.2

## 2019-09-20 ENCOUNTER — ANTI-COAG VISIT (OUTPATIENT)
Dept: CARDIOLOGY | Age: 79
End: 2019-09-20

## 2019-09-20 LAB — INR BLD: 2.7

## 2019-09-26 LAB — INR BLD: 2.6

## 2019-09-27 ENCOUNTER — ANTI-COAG VISIT (OUTPATIENT)
Dept: CARDIOLOGY | Age: 79
End: 2019-09-27

## 2020-03-18 ENCOUNTER — TELEPHONE (OUTPATIENT)
Dept: CARDIOLOGY | Age: 80
End: 2020-03-18

## 2020-10-28 NOTE — PLAN OF CARE
Problem: Patient Care Overview  Goal: Plan of Care Review  Outcome: Ongoing (interventions implemented as appropriate)   03/13/18 6783   Coping/Psychosocial   Plan of Care Reviewed With patient   Plan of Care Review   Progress no change   OTHER   Outcome Summary pt vss, po intake decreased, sat up in chair for 2 1/2 hours.     Goal: Individualization and Mutuality  Outcome: Ongoing (interventions implemented as appropriate)    Goal: Discharge Needs Assessment  Outcome: Ongoing (interventions implemented as appropriate)    Goal: Interprofessional Rounds/Family Conf  Outcome: Ongoing (interventions implemented as appropriate)      Problem: Renal Failure/Kidney Injury, Acute (Adult)  Goal: Signs and Symptoms of Listed Potential Problems Will be Absent, Minimized or Managed (Renal Failure/Kidney Injury, Acute)  Outcome: Ongoing (interventions implemented as appropriate)      Problem: Wound (Includes Pressure Injury) (Adult)  Goal: Signs and Symptoms of Listed Potential Problems Will be Absent, Minimized or Managed (Wound)  Outcome: Ongoing (interventions implemented as appropriate)         Will continue wound care , will need 6 weeks of therapy , will check  ESR , CRP while on therapy